# Patient Record
Sex: FEMALE | Race: WHITE | Employment: STUDENT | URBAN - METROPOLITAN AREA
[De-identification: names, ages, dates, MRNs, and addresses within clinical notes are randomized per-mention and may not be internally consistent; named-entity substitution may affect disease eponyms.]

---
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
## 2020-01-02 ENCOUNTER — APPOINTMENT (OUTPATIENT)
Age: 20
Setting detail: DERMATOLOGY
End: 2020-01-02

## 2020-01-02 DIAGNOSIS — L71.0 PERIORAL DERMATITIS: ICD-10-CM

## 2020-01-02 PROCEDURE — OTHER PRESCRIPTION: OTHER

## 2020-01-02 PROCEDURE — OTHER COUNSELING: OTHER

## 2020-01-02 PROCEDURE — 99213 OFFICE O/P EST LOW 20 MIN: CPT

## 2020-01-02 RX ORDER — CLINDAMYCIN PHOSPHATE 10 MG/ML
LOTION TOPICAL
Qty: 1 | Refills: 2 | Status: ERX | COMMUNITY
Start: 2020-01-02

## 2020-01-02 NOTE — HPI: RASH
How Severe Is Your Rash?: severe
Is This A New Presentation, Or A Follow-Up?: Rash
Additional History: She has been using metronidazole qd

## 2020-03-04 ENCOUNTER — DOCUMENTATION ONLY (OUTPATIENT)
Dept: FAMILY MEDICINE | Facility: CLINIC | Age: 20
End: 2020-03-04

## 2020-03-04 NOTE — PROGRESS NOTES
[facility]  Siva ATC follow-up note  Date of service performed: 3/4/20    Concern/injury: Maintenance    Assessment/plan: Jazmín Millan ATC

## 2020-03-18 ENCOUNTER — RX ONLY (RX ONLY)
Age: 20
End: 2020-03-18

## 2020-03-18 RX ORDER — METRONIDAZOLE 7.5 MG/G
CREAM TOPICAL BID
Qty: 1 | Refills: 0 | Status: ERX

## 2020-05-19 ENCOUNTER — APPOINTMENT (OUTPATIENT)
Age: 20
Setting detail: DERMATOLOGY
End: 2020-05-19

## 2020-05-19 DIAGNOSIS — L70.0 ACNE VULGARIS: ICD-10-CM

## 2020-05-19 DIAGNOSIS — L71.0 PERIORAL DERMATITIS: ICD-10-CM

## 2020-05-19 PROCEDURE — OTHER ADDITIONAL NOTES: OTHER

## 2020-05-19 PROCEDURE — OTHER PRESCRIPTION SAMPLES PROVIDED: OTHER

## 2020-05-19 PROCEDURE — OTHER COUNSELING: OTHER

## 2020-05-19 PROCEDURE — OTHER PRESCRIPTION: OTHER

## 2020-05-19 PROCEDURE — 99213 OFFICE O/P EST LOW 20 MIN: CPT

## 2020-05-19 NOTE — PROCEDURE: COUNSELING
Patient Specific Counseling (Will Not Stick From Patient To Patient): Continue Spironolactone 50mg bid that primary care prescribed.
Birth Control Pills Counseling: Birth Control Pill Counseling: I discussed with the patient the potential side effects of OCPs including but not limited to increased risk of stroke, heart attack, thrombophlebitis, deep venous thrombosis, hepatic adenomas, breast changes, GI upset, headaches, and depression.  The patient verbalized understanding of the proper use and possible adverse effects of OCPs. All of the patient's questions and concerns were addressed.
Isotretinoin Counseling: Patient should get monthly blood tests, not donate blood, not drive at night if vision affected, not share medication, and not undergo elective surgery for 6 months after tx completed. Side effects reviewed, pt to contact office should one occur.
Include Pregnancy/Lactation Warning?: No
Isotretinoin Pregnancy And Lactation Text: This medication is Pregnancy Category X and is considered extremely dangerous during pregnancy. It is unknown if it is excreted in breast milk.
Detail Level: Detailed
Erythromycin Pregnancy And Lactation Text: This medication is Pregnancy Category B and is considered safe during pregnancy. It is also excreted in breast milk.
Topical Clindamycin Pregnancy And Lactation Text: This medication is Pregnancy Category B and is considered safe during pregnancy. It is unknown if it is excreted in breast milk.
Topical Clindamycin Counseling: Patient counseled that this medication may cause skin irritation or allergic reactions.  In the event of skin irritation, the patient was advised to reduce the amount of the drug applied or use it less frequently.   The patient verbalized understanding of the proper use and possible adverse effects of clindamycin.  All of the patient's questions and concerns were addressed.
Dapsone Counseling: I discussed with the patient the risks of dapsone including but not limited to hemolytic anemia, agranulocytosis, rashes, methemoglobinemia, kidney failure, peripheral neuropathy, headaches, GI upset, and liver toxicity.  Patients who start dapsone require monitoring including baseline LFTs and weekly CBCs for the first month, then every month thereafter.  The patient verbalized understanding of the proper use and possible adverse effects of dapsone.  All of the patient's questions and concerns were addressed.
Birth Control Pills Pregnancy And Lactation Text: This medication should be avoided if pregnant and for the first 30 days post-partum.
Spironolactone Counseling: Patient advised regarding risks of diarrhea, abdominal pain, hyperkalemia, birth defects (for female patients), liver toxicity and renal toxicity. The patient may need blood work to monitor liver and kidney function and potassium levels while on therapy. The patient verbalized understanding of the proper use and possible adverse effects of spironolactone.  All of the patient's questions and concerns were addressed.
Benzoyl Peroxide Counseling: Patient counseled that medicine may cause skin irritation and bleach clothing.  In the event of skin irritation, the patient was advised to reduce the amount of the drug applied or use it less frequently.   The patient verbalized understanding of the proper use and possible adverse effects of benzoyl peroxide.  All of the patient's questions and concerns were addressed.
Doxycycline Counseling:  Patient counseled regarding possible photosensitivity and increased risk for sunburn.  Patient instructed to avoid sunlight, if possible.  When exposed to sunlight, patients should wear protective clothing, sunglasses, and sunscreen.  The patient was instructed to call the office immediately if the following severe adverse effects occur:  hearing changes, easy bruising/bleeding, severe headache, or vision changes.  The patient verbalized understanding of the proper use and possible adverse effects of doxycycline.  All of the patient's questions and concerns were addressed.
Dapsone Pregnancy And Lactation Text: This medication is Pregnancy Category C and is not considered safe during pregnancy or breast feeding.
Erythromycin Counseling:  I discussed with the patient the risks of erythromycin including but not limited to GI upset, allergic reaction, drug rash, diarrhea, increase in liver enzymes, and yeast infections.
Azithromycin Pregnancy And Lactation Text: This medication is considered safe during pregnancy and is also secreted in breast milk.
Azithromycin Counseling:  I discussed with the patient the risks of azithromycin including but not limited to GI upset, allergic reaction, drug rash, diarrhea, and yeast infections.
Sarecycline Counseling: Patient advised regarding possible photosensitivity and discoloration of the teeth, skin, lips, tongue and gums.  Patient instructed to avoid sunlight, if possible.  When exposed to sunlight, patients should wear protective clothing, sunglasses, and sunscreen.  The patient was instructed to call the office immediately if the following severe adverse effects occur:  hearing changes, easy bruising/bleeding, severe headache, or vision changes.  The patient verbalized understanding of the proper use and possible adverse effects of sarecycline.  All of the patient's questions and concerns were addressed.
Tazorac Pregnancy And Lactation Text: This medication is not safe during pregnancy. It is unknown if this medication is excreted in breast milk.
High Dose Vitamin A Counseling: Side effects reviewed, pt to contact office should one occur.
Topical Sulfur Applications Counseling: Topical Sulfur Counseling: Patient counseled that this medication may cause skin irritation or allergic reactions.  In the event of skin irritation, the patient was advised to reduce the amount of the drug applied or use it less frequently.   The patient verbalized understanding of the proper use and possible adverse effects of topical sulfur application.  All of the patient's questions and concerns were addressed.
Doxycycline Pregnancy And Lactation Text: This medication is Pregnancy Category D and not consider safe during pregnancy. It is also excreted in breast milk but is considered safe for shorter treatment courses.
Minocycline Pregnancy And Lactation Text: This medication is Pregnancy Category D and not consider safe during pregnancy. It is also excreted in breast milk.
High Dose Vitamin A Pregnancy And Lactation Text: High dose vitamin A therapy is contraindicated during pregnancy and breast feeding.
Topical Sulfur Applications Pregnancy And Lactation Text: This medication is Pregnancy Category C and has an unknown safety profile during pregnancy. It is unknown if this topical medication is excreted in breast milk.
Topical Retinoid counseling:  Patient advised to apply a pea-sized amount only at bedtime and wait 30 minutes after washing their face before applying.  If too drying, patient may add a non-comedogenic moisturizer. The patient verbalized understanding of the proper use and possible adverse effects of retinoids.  All of the patient's questions and concerns were addressed.
Minocycline Counseling: Patient advised regarding possible photosensitivity and discoloration of the teeth, skin, lips, tongue and gums.  Patient instructed to avoid sunlight, if possible.  When exposed to sunlight, patients should wear protective clothing, sunglasses, and sunscreen.  The patient was instructed to call the office immediately if the following severe adverse effects occur:  hearing changes, easy bruising/bleeding, severe headache, or vision changes.  The patient verbalized understanding of the proper use and possible adverse effects of minocycline.  All of the patient's questions and concerns were addressed.
Benzoyl Peroxide Pregnancy And Lactation Text: This medication is Pregnancy Category C. It is unknown if benzoyl peroxide is excreted in breast milk.
Topical Retinoid Pregnancy And Lactation Text: This medication is Pregnancy Category C. It is unknown if this medication is excreted in breast milk.
Bactrim Counseling:  I discussed with the patient the risks of sulfa antibiotics including but not limited to GI upset, allergic reaction, drug rash, diarrhea, dizziness, photosensitivity, and yeast infections.  Rarely, more serious reactions can occur including but not limited to aplastic anemia, agranulocytosis, methemoglobinemia, blood dyscrasias, liver or kidney failure, lung infiltrates or desquamative/blistering drug rashes.
Spironolactone Pregnancy And Lactation Text: This medication can cause feminization of the male fetus and should be avoided during pregnancy. The active metabolite is also found in breast milk.
Tazorac Counseling:  Patient advised that medication is irritating and drying.  Patient may need to apply sparingly and wash off after an hour before eventually leaving it on overnight.  The patient verbalized understanding of the proper use and possible adverse effects of tazorac.  All of the patient's questions and concerns were addressed.
Bactrim Pregnancy And Lactation Text: This medication is Pregnancy Category D and is known to cause fetal risk.  It is also excreted in breast milk.
Tetracycline Counseling: Patient counseled regarding possible photosensitivity and increased risk for sunburn.  Patient instructed to avoid sunlight, if possible.  When exposed to sunlight, patients should wear protective clothing, sunglasses, and sunscreen.  The patient was instructed to call the office immediately if the following severe adverse effects occur:  hearing changes, easy bruising/bleeding, severe headache, or vision changes.  The patient verbalized understanding of the proper use and possible adverse effects of tetracycline.  All of the patient's questions and concerns were addressed. Patient understands to avoid pregnancy while on therapy due to potential birth defects.

## 2020-05-19 NOTE — PROCEDURE: ADDITIONAL NOTES
Additional Notes: DISCONTINUE tretinoin .025 and .05 cream to lower half of face as this is likely aggravating her POD and d/c neutrogrna hydro boost creams for now.
Detail Level: Simple

## 2020-06-08 ENCOUNTER — APPOINTMENT (OUTPATIENT)
Age: 20
Setting detail: DERMATOLOGY
End: 2020-06-08

## 2020-06-08 VITALS — RESPIRATION RATE: 14 BRPM | WEIGHT: 145 LBS | HEIGHT: 67 IN

## 2020-07-08 DIAGNOSIS — Z11.59 SPECIAL SCREENING EXAMINATION FOR VIRAL DISEASE: Primary | ICD-10-CM

## 2020-07-10 DIAGNOSIS — Z11.59 SPECIAL SCREENING EXAMINATION FOR VIRAL DISEASE: ICD-10-CM

## 2020-07-10 NOTE — LETTER
July 17, 2020        Alessandra Mccormick  [address] [facility] [address]    This letter provides a written record that you were tested for COVID-19 on 7/10/20.       Your result was negative. This means that we didn t find the virus that causes COVID-19 in your sample. A test may show negative when you do actually have the virus. This can happen when the virus is in the early stages of infection, before you feel illness symptoms.    If you have symptoms   Stay home and away from others (self-isolate) until you meet ALL of the guidelines below:    You ve had no fever--and no medicine that reduces fever--for 3 full days (72 hours). And      Your other symptoms have gotten better. For example, your cough or breathing has improved. And     At least 10 days have passed since your symptoms started.    During this time:    Stay home. Don t go to work, school or anywhere else.     Stay in your own room, including for meals. Use your own bathroom if you can.    Stay away from others in your home. No hugging, kissing or shaking hands. No visitors.    Clean  high touch  surfaces often (doorknobs, counters, handles, etc.). Use a household cleaning spray or wipes. You can find a full list on the EPA website at www.epa.gov/pesticide-registration/list-n-disinfectants-use-against-sars-cov-2.    Cover your mouth and nose with a mask, tissue or washcloth to avoid spreading germs.    Wash your hands and face often with soap and water.    Going back to work  Check with your employer for any guidelines to follow for going back to work.    Employers: This document serves as formal notice that your employee tested negative for COVID-19, as of the testing date shown above.

## 2020-07-10 NOTE — LETTER
July 16, 2020        Alessandra Mccormick  [address] [facility] [address]      COVID-19 Antibody, IgG   Date Value Ref Range Status   07/10/2020 Negative NEG^Negative Final     Comment:     Negative results do not rule out SARS-CoV-2 infection, particularly in those   who have been in contact with the virus.  Follow-up testing with a molecular   diagnostic should be considered to rule out infection in these individuals.  Results from antibody testing should not be used as the sole basis to diagnose   or exclude SARS-CoV-2 infection or to inform infection status.           You have tested NEGATIVE for COVID-19 antibodies. This suggests you have not had or been exposed to COVID-19. But it does not mean that for sure.     The test finds antibodies in most people 10 days after they get sick. For some people, it takes longer than 10 days for antibodies to show up. Others may never show antibodies against COVID-19, especially if they have weak immune systems.    If you have COVID-19 symptoms now, please stay home and away from others.     What is antibody testing?    This is a kind of blood test. We take a small sample of your blood, and then test it for something called  antibodies.      Your body makes antibodies to fight infection. If your blood has antibodies for a certain germ, it means you ve been infected with that germ in the past.     Sometimes, antibodies stay in your body for years after you ve had the infection. They can be there even if the germ didn t make you sick. They are a sign that your body fought off the infection.    Will this test find antibodies in everyone who s had COVID-19?    No. The test finds antibodies in most people 10 days after they get sick. For some people, it takes longer than 10 days for antibodies to show up. Others may never show antibodies against COVID-19, especially if they have weak immune systems.    What does it mean if the test finds COVID-19 antibodies?    If we  find these antibodies, it suggests:     This person has had the virus.     Their body s immune system fought the virus.     We don t know if this will help protect someone from getting COVID-19 again. Scientists are still learning about this.    What are the signs of COVID-19?    Signs of COVID-19 can appear from 2 to 14 days (up to 2 weeks) after you re infected. Some people have no symptoms or only mild symptoms. Others get very sick. The most common symptoms are:          Cough    Shortness of breath or trouble breathing  Or at least 2 of these symptoms:    Fever    Chills    Repeated shaking with chills    Muscle pain    Headache    Sore throat    Losing your sense of taste or smell    You may have other symptoms. Please contact your doctor or clinic for any symptoms that worry you.    Where can I get more information?     To learn the [facility] guidelines for staying home, please visit the [place] Department of Health website at [email or web address]    To learn more about COVID-19 and how to care for yourself at home, please visit the CDC website at https://www.cdc.gov/coronavirus/2019-ncov/about/steps-when-sick.html    For more options for care at [facility], please visit our website at [email or web address]    [facility] ([facility]) COVID-19 Hotline:  [phone]

## 2020-07-11 LAB
SARS-COV-2 RNA SPEC QL NAA+PROBE: NOT DETECTED
SPECIMEN SOURCE: NORMAL

## 2020-07-12 DIAGNOSIS — Z11.59 SPECIAL SCREENING EXAMINATION FOR VIRAL DISEASE: ICD-10-CM

## 2020-07-13 LAB
COVID-19 ANTIBODY IGG: NEGATIVE
LAB TEST METHOD: NORMAL
SARS-COV-2 RNA SPEC QL NAA+PROBE: NOT DETECTED
SPECIMEN SOURCE: NORMAL

## 2020-08-08 DIAGNOSIS — Z11.59 SPECIAL SCREENING EXAMINATION FOR VIRAL DISEASE: Primary | ICD-10-CM

## 2020-08-11 DIAGNOSIS — Z11.59 SPECIAL SCREENING EXAMINATION FOR VIRAL DISEASE: ICD-10-CM

## 2020-08-12 LAB
SARS-COV-2 RNA SPEC QL NAA+PROBE: NOT DETECTED
SPECIMEN SOURCE: NORMAL

## 2020-08-13 DIAGNOSIS — Z11.59 SPECIAL SCREENING EXAMINATION FOR VIRAL DISEASE: Primary | ICD-10-CM

## 2020-08-18 DIAGNOSIS — Z11.59 SPECIAL SCREENING EXAMINATION FOR VIRAL DISEASE: ICD-10-CM

## 2020-08-19 LAB
SARS-COV-2 RNA SPEC QL NAA+PROBE: NOT DETECTED
SPECIMEN SOURCE: NORMAL

## 2020-08-20 ENCOUNTER — DOCUMENTATION ONLY (OUTPATIENT)
Dept: FAMILY MEDICINE | Facility: CLINIC | Age: 20
End: 2020-08-20

## 2020-08-21 ENCOUNTER — ANCILLARY PROCEDURE (OUTPATIENT)
Dept: GENERAL RADIOLOGY | Facility: CLINIC | Age: 20
End: 2020-08-21
Attending: FAMILY MEDICINE
Payer: COMMERCIAL

## 2020-08-21 DIAGNOSIS — M25.571 ACUTE RIGHT ANKLE PAIN: ICD-10-CM

## 2020-08-21 DIAGNOSIS — M25.571 ACUTE RIGHT ANKLE PAIN: Primary | ICD-10-CM

## 2020-08-21 NOTE — PROGRESS NOTES
"[facility] ATHLETICS  [facility] ATC initial assessment note  Date of service performed: 8/20/2020    Concern: Acute injury  Body part: Ankle  Description: Right  Injury: Sprain  Type: Athletics related  Date of injury: 8/20/2020    S: Pt c/o lateral R ankle P! After inversion mechanism with contact.  Pt has hx of bilateral ankle sprains, reports she was doing steady rehab for a \"long time\".  Visible edema over ATF, anterior to lat mal. No visible bruising. Pt denies N/T. Distal pulse intact.    O: Squeeze (-) for farideh p!, mild p! Over ligaments during test. Bump (-), Malleolar flick (-), no fracture suspected.  Anterior Drawer (+) for p! And laxity. End feel present. Kleigers (-).  Limited ROM in all directions, strength not evaluated based on p! And swelling.      A: Lateral ankle sprain, grade II     P: Pt removed from practice, ice with compression for 30 minutes.  She was given horseshoe pad and compression to wear after showering for rest of the evening.  Instructed not to sleep with compression.  Pt reports she is eager to use the Christianity bands for edema drainage, reports they helped \"a ton\" last time she rolled her ankle.  We will re-evaluate tomorrow and consider appropriate treatments.     Chasidy Parker, ATC          "

## 2020-08-21 NOTE — PROGRESS NOTES
"[facility] ATHLETICS  [facility] ATC follow-up note  Date of service performed: 8/21/2020    Concern/injury: R Ankle Sprain    Assessment/plan: Pt f/u with ATC at field today.  Increased edema now present anterior, posterior, and inferior to R lat mal. Pt is now tender to palpate over lateral malleolus and along distal shaft of fibula.  Bruisuing visible posterior to lat mal and along distal shaft of fibula.  She reports it hurts \"quite a bit more\" today than yesterday and reports its now feels \"different\" than pas ankle sprains.  ATC consulted Dr Reeder, and Pt was [place] [facility] [place].      Chasidy Parker ATC      "

## 2020-08-24 NOTE — PROGRESS NOTES
[facility] ATHLETICS  [facility] ATC follow-up note  Date of service performed: 8/24/20    Concern/injury: R Ankle    Assessment/plan:  s ankle pain and swelling has improved by this morning, though she still does have a moderate amount of ankle swelling.  began a home rehab program consisting of ROM and proprioception exercises to be completed daily and she was given a new compression sleeve to wear during the day.  had ankle tape applied and was able to participate in dynamic warm up and 20 minutes of technical work during practice today.    Linda Millan ATC

## 2020-08-25 DIAGNOSIS — Z11.59 SPECIAL SCREENING EXAMINATION FOR VIRAL DISEASE: ICD-10-CM

## 2020-08-26 LAB
SARS-COV-2 RNA SPEC QL NAA+PROBE: NOT DETECTED
SPECIMEN SOURCE: NORMAL

## 2020-08-26 NOTE — PROGRESS NOTES
[facility] ATHLETICS  [facility] ATC follow-up note  Date of service performed: 8/25/20    Concern/injury: R Ankle    Assessment/plan:  noted that her ankle is more sore today than it was yesterday.  performed rehab on her own at home and compression/distraction routine prior to practice.  s ankle was taped and she participated in full dynamic warm up, followed by about 15 minutes of technical work. After,  completed a bike workout and noted that her ankle began to hurt more by the end of the bike workout and was much more sore after. Ice was applied, and  will be reevaluated tomorrow morning.    Linda Millan, ATC

## 2020-08-27 NOTE — PROGRESS NOTES
[facility] ATHLETICS  [facility] ATC follow-up note  Date of service performed: 8/26/20    Concern/injury: R Ankle    Assessment/plan: Still very sore this morning. No increase in swelling noted. Voo doo band compression distraction ROM routine, BAPS board ROM, heel/toe walking, S/L balance. SA will complete an upper body only workout today and will only participate in throw ins during practice today. Will re-evaluate tomorrow morning.    Linda Millan, ATC

## 2020-08-27 NOTE — PROGRESS NOTES
[facility] ATHLETICS  [facility] ATC follow-up note  Date of service performed: 8/27/20    Concern/injury: R Ankle    Assessment/plan: SA feeling slightly better today. Swelling seems to have diminished slightly, but bruising has increased. Voo-doo band compression/distraction ROM routine. BAPS board ROM, heel/toe walking, S/L eyes closed balance. Tape ankle for practice. Participated in full dynamic warm up and 20 minutes of technical work. Finished with bike workout. Ice wrapped on after.    Linda Millan ATC

## 2020-08-28 NOTE — PROGRESS NOTES
[facility] ATHLETICS  [facility] ATC follow-up note  Date of service performed: 8/28/20    Concern/injury: R Ankle    Assessment/plan: Ankle is still feeling as good as it did yesterday. Swelling/bruising remains the same as yesterday as well. Voo doo band compression/distraction ROM work. BAPS board ROM, heel/toe walking, S/L eyes closed balance, slant board stretch. Tape for practice, able to participate in technical passing patterns as well as soccer tennis without restrictions.    Linda Millan, ATC

## 2020-09-01 DIAGNOSIS — Z11.59 SPECIAL SCREENING EXAMINATION FOR VIRAL DISEASE: ICD-10-CM

## 2020-09-01 NOTE — PROGRESS NOTES
[facility] ATHLETICS  [facility] ATC follow-up note  Date of service performed: 8/31/20    Concern/injury: R Ankle    Assessment/plan: Feeling much better, swelling and bruising almost completely diminished. Rehab: BAPS board, 4 way ankle, S/L calf raise, S/L balance. SA able to participate in a non-contact capacity today. Practice felt good.    Linda Millan ATC

## 2020-09-02 LAB
SARS-COV-2 RNA SPEC QL NAA+PROBE: NOT DETECTED
SPECIMEN SOURCE: NORMAL

## 2020-09-02 NOTE — PROGRESS NOTES
[facility] ATHLETICS  [facility] ATC follow-up note  Date of service performed: 9/1/20    Concern/injury: R Ankle    Assessment/plan: Continuing to feel good this morning. Rehab: BAPS board, 4 way ankle, S/L calf raise, S/L balance. SA able to participate in a non-contact capacity again today. SA did mostly well but accidentally stepped wrong at one point and  tweaked  her ankle slightly. SA had ice wrapped on after practice.    Linda Millan, ATC

## 2020-09-03 NOTE — PROGRESS NOTES
[facility] ATHLETICS  [facility] ATC follow-up note  Date of service performed: 9/2/20    Concern/injury: R Ankle    Assessment/plan: Feeling good again this morning, maybe slightly more sore than yesterday, but mostly feeling good.Rehab: BAPS board, 4 way ankle, S/L calf raise, S/L balance. SA able to participate in practice in a full contact capacity during set pieces. SA felt good during this.    Linda Millan ATC

## 2020-09-04 NOTE — PROGRESS NOTES
[facility] ATHLETICS  [facility] ATC follow-up note  Date of service performed: 9/3/20    Concern/injury: R Ankle    Assessment/plan: SA feeling good again this morning. Rehab: BAPS board, 4 way ankle, S/L calf raise, S/L balance. SA able to participate in practice in a full contact capacity. SA had no issues with practice participation.    Linda Millan, ATC

## 2020-09-08 DIAGNOSIS — Z11.59 SPECIAL SCREENING EXAMINATION FOR VIRAL DISEASE: ICD-10-CM

## 2020-09-08 LAB
SARS-COV-2 RNA SPEC QL NAA+PROBE: NOT DETECTED
SPECIMEN SOURCE: NORMAL

## 2020-09-08 NOTE — PROGRESS NOTES
[facility] ATHLETICS  [facility] ATC follow-up note  Date of service performed: 9/4/20    Concern/injury: R Ankle    Assessment/plan: SA feeling good again this morning. Rehab: BAPS board, 4 way ankle, S/L calf raise, S/L balance. SA able to participate fully in practice with no issues.    Linda Millan, ATC

## 2020-09-09 NOTE — PROGRESS NOTES
[facility] ATHLETICS  [facility] ATC follow-up note  Date of service performed: 9/8/20    Concern/injury: R Ankle    Assessment/plan: SA continuing to feel good today. Rehab: 4 way ankle, S/L calf raise, S/L eyes closed balance, S/L line hops (fwd/back & side to side). SA able to fully participate in practice without any issues.    Linda Millan ATC

## 2020-09-09 NOTE — PROGRESS NOTES
[facility] ATHLETICS  [facility] ATC follow-up note  Date of service performed: 9/9/20    Concern/injury: R Ankle    Assessment/plan: Rehab: 4 way ankle, S/L calf raise, S/L eyes closed balance, S/L line hops (fwd/back & side to side).     Linda Millan, ATC

## 2020-09-14 NOTE — PROGRESS NOTES
[facility] ATHLETICS  [facility] ATC follow-up note  Date of service performed: 9/11/20    Concern/injury: R Ankle    Assessment/plan: SA continuing to feel good. Rehab: 4 way ankle, S/L calf raise, S/L eyes closed balance, S/L line hops (fwd/back & side to side).     Linda Millan, ATC

## 2020-09-15 DIAGNOSIS — Z11.59 SPECIAL SCREENING EXAMINATION FOR VIRAL DISEASE: ICD-10-CM

## 2020-09-15 LAB
SARS-COV-2 RNA SPEC QL NAA+PROBE: NOT DETECTED
SPECIMEN SOURCE: NORMAL

## 2020-09-15 NOTE — PROGRESS NOTES
[facility] ATHLETICS  [facility] ATC follow-up note  Date of service performed: 9/14/20    Concern/injury: R Ankle    Assessment/plan: SA feeling good again after the weekend. Rehab: 4 way ankle, S/L calf raise, S/L eyes closed balance, S/L line hops (fwd/back & side to side).     Linda Millan, ATC

## 2020-09-16 NOTE — PROGRESS NOTES
[facility] ATHLETICS  [facility] ATC follow-up note  Date of service performed: 9/16/20    Concern/injury: R Ankle    Assessment/plan: Rehab: 4 way ankle, S/L calf raise, S/L eyes closed balance, S/L line hops (fwd/back & side to side).     Linda Millan, ATC

## 2020-09-16 NOTE — PROGRESS NOTES
[facility] ATHLETICS  [facility] ATC follow-up note  Date of service performed: 9/15/20    Concern/injury: R Ankle    Assessment/plan: Rehab: 4 way ankle, S/L calf raise, S/L eyes closed balance, S/L line hops (fwd/back & side to side). SA did well with practice participation today.    Linda Millan, ATC

## 2020-09-18 NOTE — PROGRESS NOTES
[facility] ATHLETICS  [facility] ATC follow-up note  Date of service performed: 9/17/20    Concern/injury: R Ankle    Assessment/plan: Rehab: 4 way ankle, S/L calf raise, S/L eyes closed balance, S/L line hops (fwd/back & side to side).     Linda Millan, ATC

## 2020-09-21 NOTE — PROGRESS NOTES
[facility] ATHLETICS  [facility] ATC follow-up note  Date of service performed: 9/21/20    Concern/injury: R Ankle    Assessment/plan: Rehab: 4 way ankle, S/L calf raise, S/L eyes closed balance, S/L line hops (fwd/back & side to side).     Linda Millan, ATC

## 2020-09-22 DIAGNOSIS — Z11.59 SPECIAL SCREENING EXAMINATION FOR VIRAL DISEASE: ICD-10-CM

## 2020-09-23 LAB
SARS-COV-2 RNA SPEC QL NAA+PROBE: NOT DETECTED
SPECIMEN SOURCE: NORMAL

## 2020-09-23 NOTE — PROGRESS NOTES
[facility] ATHLETICS  [facility] ATC follow-up note  Date of service performed: 9/22/20    Concern/injury: R Ankle    Assessment/plan: Rehab: 4 way ankle, S/L calf raise, S/L eyes closed balance, S/L line hops (fwd/back & side to side). No issues at practice today.    Linda Millan, ATC

## 2020-09-28 DIAGNOSIS — Z11.59 SPECIAL SCREENING EXAMINATION FOR VIRAL DISEASE: Primary | ICD-10-CM

## 2020-09-29 DIAGNOSIS — Z11.59 SPECIAL SCREENING EXAMINATION FOR VIRAL DISEASE: ICD-10-CM

## 2020-09-30 LAB
SARS-COV-2 RNA SPEC QL NAA+PROBE: NOT DETECTED
SPECIMEN SOURCE: NORMAL

## 2020-09-30 NOTE — PROGRESS NOTES
[facility] ATHLETICS  [facility] ATC follow-up note  Date of service performed: 9/30/20    Concern/injury: R Ankle    Assessment/plan: SA was in contact w/ATC while in quarantine, and notes that her knee is feeling back to normal at this time. SA will discontinue rehab program and injury will be considered resolved.    Linda Millan ATC

## 2020-10-05 DIAGNOSIS — Z11.59 SPECIAL SCREENING EXAMINATION FOR VIRAL DISEASE: ICD-10-CM

## 2020-10-05 LAB
SARS-COV-2 RNA SPEC QL NAA+PROBE: NOT DETECTED
SPECIMEN SOURCE: NORMAL

## 2020-10-05 PROCEDURE — 99000 SPECIMEN HANDLING OFFICE-LAB: CPT | Performed by: PATHOLOGY

## 2020-10-05 PROCEDURE — U0003 INFECTIOUS AGENT DETECTION BY NUCLEIC ACID (DNA OR RNA); SEVERE ACUTE RESPIRATORY SYNDROME CORONAVIRUS 2 (SARS-COV-2) (CORONAVIRUS DISEASE [COVID-19]), AMPLIFIED PROBE TECHNIQUE, MAKING USE OF HIGH THROUGHPUT TECHNOLOGIES AS DESCRIBED BY CMS-2020-01-R: HCPCS | Mod: 90 | Performed by: PATHOLOGY

## 2020-10-13 DIAGNOSIS — Z11.59 SPECIAL SCREENING EXAMINATION FOR VIRAL DISEASE: ICD-10-CM

## 2020-10-13 PROCEDURE — 99000 SPECIMEN HANDLING OFFICE-LAB: CPT | Performed by: PATHOLOGY

## 2020-10-13 PROCEDURE — U0003 INFECTIOUS AGENT DETECTION BY NUCLEIC ACID (DNA OR RNA); SEVERE ACUTE RESPIRATORY SYNDROME CORONAVIRUS 2 (SARS-COV-2) (CORONAVIRUS DISEASE [COVID-19]), AMPLIFIED PROBE TECHNIQUE, MAKING USE OF HIGH THROUGHPUT TECHNOLOGIES AS DESCRIBED BY CMS-2020-01-R: HCPCS | Mod: 90 | Performed by: PATHOLOGY

## 2020-10-14 LAB
SARS-COV-2 RNA SPEC QL NAA+PROBE: NOT DETECTED
SPECIMEN SOURCE: NORMAL

## 2020-10-21 DIAGNOSIS — Z11.59 SPECIAL SCREENING EXAMINATION FOR VIRAL DISEASE: ICD-10-CM

## 2020-10-21 LAB
SARS-COV-2 RNA SPEC QL NAA+PROBE: NOT DETECTED
SPECIMEN SOURCE: NORMAL

## 2020-10-21 PROCEDURE — U0003 INFECTIOUS AGENT DETECTION BY NUCLEIC ACID (DNA OR RNA); SEVERE ACUTE RESPIRATORY SYNDROME CORONAVIRUS 2 (SARS-COV-2) (CORONAVIRUS DISEASE [COVID-19]), AMPLIFIED PROBE TECHNIQUE, MAKING USE OF HIGH THROUGHPUT TECHNOLOGIES AS DESCRIBED BY CMS-2020-01-R: HCPCS | Mod: 90 | Performed by: PATHOLOGY

## 2020-10-21 PROCEDURE — 99000 SPECIMEN HANDLING OFFICE-LAB: CPT | Performed by: PATHOLOGY

## 2020-11-18 DIAGNOSIS — M25.562 ACUTE PAIN OF LEFT KNEE: Primary | ICD-10-CM

## 2020-11-18 NOTE — PROGRESS NOTES
The patient reported left knee pain with joint line tenderness and mechanical symptoms.  After discussion with Dr. Muller it was determined to obtain an left knee MRI to evaluate the meniscus.       Addendum: I had a chance to review the MRI.  It shows intact cartilage of the tibiofemoral compartments as well as meniscus.  Ligaments are intact.  A fair amount of edema is noted in the patella.  Fissuring of the cartilage over the median ridge is present.    This time I would try to maximize nonsurgical management with things like time, relative rest, oral anti-inflammatories. I will call in Diclofenac.  If it does not see lasting improvement we could consider an injection.  Hopefully through a period of rest and rehabilitation as well as anti-inflammatory we can get this to quiet down.

## 2020-11-23 ENCOUNTER — ANCILLARY PROCEDURE (OUTPATIENT)
Dept: MRI IMAGING | Facility: CLINIC | Age: 20
End: 2020-11-23
Attending: ORTHOPAEDIC SURGERY
Payer: COMMERCIAL

## 2020-11-23 DIAGNOSIS — M25.562 ACUTE PAIN OF LEFT KNEE: ICD-10-CM

## 2020-11-23 PROCEDURE — 73721 MRI JNT OF LWR EXTRE W/O DYE: CPT | Mod: LT | Performed by: RADIOLOGY

## 2020-11-30 ENCOUNTER — RECORDS - HEALTHEAST (OUTPATIENT)
Dept: LAB | Facility: CLINIC | Age: 20
End: 2020-11-30

## 2020-12-01 LAB — C TRACH DNA SPEC QL PROBE+SIG AMP: NEGATIVE

## 2020-12-02 RX ORDER — DICLOFENAC SODIUM 75 MG/1
75 TABLET, DELAYED RELEASE ORAL 2 TIMES DAILY
Qty: 60 TABLET | Refills: 0 | Status: SHIPPED | OUTPATIENT
Start: 2020-12-02 | End: 2021-09-13

## 2020-12-02 RX ORDER — DICLOFENAC SODIUM 75 MG/1
75 TABLET, DELAYED RELEASE ORAL 2 TIMES DAILY
Qty: 60 TABLET | Refills: 0 | Status: SHIPPED | OUTPATIENT
Start: 2020-12-02 | End: 2020-12-02

## 2020-12-28 ENCOUNTER — RECORDS - HEALTHEAST (OUTPATIENT)
Dept: LAB | Facility: CLINIC | Age: 20
End: 2020-12-28

## 2020-12-31 LAB
BACTERIA SPEC CULT: NO GROWTH
BACTERIA SPEC CULT: NORMAL
GRAM STAIN RESULT: NORMAL
GRAM STAIN RESULT: NORMAL

## 2021-01-04 ENCOUNTER — OFFICE VISIT (OUTPATIENT)
Dept: FAMILY MEDICINE | Facility: CLINIC | Age: 21
End: 2021-01-04
Payer: COMMERCIAL

## 2021-01-04 VITALS
SYSTOLIC BLOOD PRESSURE: 116 MMHG | HEIGHT: 67 IN | DIASTOLIC BLOOD PRESSURE: 77 MMHG | BODY MASS INDEX: 23.54 KG/M2 | HEART RATE: 94 BPM | WEIGHT: 150 LBS

## 2021-01-04 DIAGNOSIS — M22.2X2 PATELLOFEMORAL PAIN SYNDROME OF LEFT KNEE: Primary | ICD-10-CM

## 2021-01-04 RX ORDER — MELOXICAM 7.5 MG/1
7.5-15 TABLET ORAL DAILY
Qty: 60 TABLET | Refills: 1 | Status: SHIPPED | OUTPATIENT
Start: 2021-01-04 | End: 2021-09-13

## 2021-01-04 ASSESSMENT — MIFFLIN-ST. JEOR: SCORE: 1483.03

## 2021-01-04 NOTE — LETTER
"  1/4/2021      RE: Alessandra Mccormick  [address] [facility] [address]       [facility]            SUBJECTIVE:     Alessandra Mccormick is a 20 year old female presenting to clinic today with a chief complaint of left knee pain with joint line tenderness and mechanical symptoms. MRI was obtained which showed  No MR evidence of internal derangement. Intact menisci, cruciate ligaments, and medial and lateral supporting structures. Full-thickness fissuring of cartilage over the median ridge of the patella with moderate subchondral edema. No MR evidence of patellofemoral maltracking or trochlear dysplasia. Mild soft tissue edema deep to the iliotibial band at the level of the lateral femoral condyle as can be seen in iliotibial band friction syndrome.  - Dr. Muller prescribed Voltaren orally after reviewing her MRI.  She did not see him in person.    - Went to [facility] and had her knee aspirated even though there was only a small amount of fluid.  Results of that procedure were negative according to the patient. Saw a physician at [facility] and is set up for PT.  - Plan to return there later this week to discuss other possible \"resources\" she could use      [facility], Medications and Allergies were reviewed and updated as needed.    ROS:  As noted above otherwise negative.    There is no problem list on file for this patient.      Current Outpatient Medications   Medication Sig Dispense Refill     diclofenac (VOLTAREN) 75 MG EC tablet Take 1 tablet (75 mg) by mouth 2 times daily 60 tablet 0     levonorgest-eth estrad 91-Day (SEASONIQUE) 0.15-0.03 &0.01 MG per tablet Take 1 tablet by mouth daily                OBJECTIVE:     Vitals:   Vitals:    01/04/21 1008   BP: 116/77   Pulse: 94   Weight: 68 kg (150 lb)   Height: 1.702 m (5' 7\")     BMI: Body mass index is 23.49 kg/m .    Gen:  Well nourished and in no acute distress  HEENT: Extraocular movement " intact.  Neck: supple  Skin: No rash, erythema, ecchymosis or obvious abnormality  Psych: Euthymic   Neuro: Alert and oriented.    MSK:   + Patellofemoral crepitus  + Mild effusion, - redness or warmth  +patellar compression and inhibition testing  + ttp over the superolateral patella  + pain to palpation over patella tendon  + Mild lateral and medial anterior joint line pain  - Amy  - Lachmans and posterior drawer, - varus and valgus  5/5 Strength, intact sensation    MRI Left knee:   1. No MR evidence of internal derangement. Intact menisci, cruciate  ligaments, and medial and lateral supporting structures.     2. Full-thickness fissuring of cartilage over the median ridge of the  patella with moderate subchondral edema. No MR evidence of  patellofemoral maltracking or trochlear dysplasia.     3. Mild soft tissue edema deep to the iliotibial band at the level of  the lateral femoral condyle as can be seen in iliotibial band friction  syndrome.         ASSESSMENT & PLAN:      1. Patellofemoral pain syndrome of left knee  - Consider PRP intra-articular injection  - Continue with physical therapy, try to get in with Cindy  - meloxicam (MOBIC) 7.5 MG tablet; Take 1-2 tablets (7.5-15 mg) by mouth daily  Dispense: 60 tablet; Refill: 1  - Will try to get her in with Dr. Lopez or Dr. Nieto this week    Return to clinic in 1 month for follow up. Return sooner if develops new or worsening symptoms.    Options for treatment and/or follow-up care were reviewed with the patient and . Patient was actively involved in the decision making process. Patient verbalized understanding and was in agreement with the plan.    The patient was seen by and discussed with Dr.Suzanne KENYETTA Reeder MD, CAQ, CCD    Mari Moreno DO  Primary Care Sports Medicine Fellow      Attending Note:   I have personally examined this patient and have reviewed the clinical presentation and progress note with the fellow. I agree with the  treatment plan as outlined. The plan was formulated with the fellow on the day of the patient's visit. I have reviewed all imaging with the fellow and agree with the findings in the documentation.     Coco Reeder MD, CAQ, CCD  [facility]  Sports Medicine and Bone Health      Coco Reeder MD

## 2021-01-04 NOTE — LETTER
Date:January 14, 2021      Patient was self referred, no letter generated. Do not send.        [facility] Health Information

## 2021-01-04 NOTE — PROGRESS NOTES
"[facility]            SUBJECTIVE:     Alessandra Mccormick is a 20 year old female presenting to clinic today with a chief complaint of left knee pain with joint line tenderness and mechanical symptoms. MRI was obtained which showed  No MR evidence of internal derangement. Intact menisci, cruciate ligaments, and medial and lateral supporting structures. Full-thickness fissuring of cartilage over the median ridge of the patella with moderate subchondral edema. No MR evidence of patellofemoral maltracking or trochlear dysplasia. Mild soft tissue edema deep to the iliotibial band at the level of the lateral femoral condyle as can be seen in iliotibial band friction syndrome.  - Dr. Muller prescribed Voltaren orally after reviewing her MRI.  She did not see him in person.    - Went to [facility] and had her knee aspirated even though there was only a small amount of fluid.  Results of that procedure were negative according to the patient. Saw a physician at [facility] and is set up for PT.  - Plan to return there later this week to discuss other possible \"resources\" she could use      [facility], Medications and Allergies were reviewed and updated as needed.    ROS:  As noted above otherwise negative.    There is no problem list on file for this patient.      Current Outpatient Medications   Medication Sig Dispense Refill     diclofenac (VOLTAREN) 75 MG EC tablet Take 1 tablet (75 mg) by mouth 2 times daily 60 tablet 0     levonorgest-eth estrad 91-Day (SEASONIQUE) 0.15-0.03 &0.01 MG per tablet Take 1 tablet by mouth daily                OBJECTIVE:     Vitals:   Vitals:    01/04/21 1008   BP: 116/77   Pulse: 94   Weight: 68 kg (150 lb)   Height: 1.702 m (5' 7\")     BMI: Body mass index is 23.49 kg/m .    Gen:  Well nourished and in no acute distress  HEENT: Extraocular movement intact.  Neck: supple  Skin: No rash, erythema, ecchymosis or obvious abnormality  Psych: Euthymic "   Neuro: Alert and oriented.    MSK:   + Patellofemoral crepitus  + Mild effusion, - redness or warmth  +patellar compression and inhibition testing  + ttp over the superolateral patella  + pain to palpation over patella tendon  + Mild lateral and medial anterior joint line pain  - Amy  - Lachmans and posterior drawer, - varus and valgus  5/5 Strength, intact sensation    MRI Left knee:   1. No MR evidence of internal derangement. Intact menisci, cruciate  ligaments, and medial and lateral supporting structures.     2. Full-thickness fissuring of cartilage over the median ridge of the  patella with moderate subchondral edema. No MR evidence of  patellofemoral maltracking or trochlear dysplasia.     3. Mild soft tissue edema deep to the iliotibial band at the level of  the lateral femoral condyle as can be seen in iliotibial band friction  syndrome.         ASSESSMENT & PLAN:      1. Patellofemoral pain syndrome of left knee  - Consider PRP intra-articular injection  - Continue with physical therapy, try to get in with Cindy  - meloxicam (MOBIC) 7.5 MG tablet; Take 1-2 tablets (7.5-15 mg) by mouth daily  Dispense: 60 tablet; Refill: 1  - Will try to get her in with Dr. Lopez or Dr. Nieto this week    Return to clinic in 1 month for follow up. Return sooner if develops new or worsening symptoms.    Options for treatment and/or follow-up care were reviewed with the patient and . Patient was actively involved in the decision making process. Patient verbalized understanding and was in agreement with the plan.    The patient was seen by and discussed with Dr.Suzanne KENYETTA Reeder MD, CAQ, CCD    Mari Moreno DO  Primary Care Sports Medicine Fellow

## 2021-01-05 ENCOUNTER — ANCILLARY PROCEDURE (OUTPATIENT)
Dept: GENERAL RADIOLOGY | Facility: CLINIC | Age: 21
End: 2021-01-05
Attending: ORTHOPAEDIC SURGERY
Payer: COMMERCIAL

## 2021-01-05 ENCOUNTER — OFFICE VISIT (OUTPATIENT)
Dept: ORTHOPEDICS | Facility: CLINIC | Age: 21
End: 2021-01-05
Payer: COMMERCIAL

## 2021-01-05 VITALS — WEIGHT: 150.3 LBS | BODY MASS INDEX: 24.15 KG/M2 | HEIGHT: 66 IN

## 2021-01-05 DIAGNOSIS — M22.2X2 PATELLOFEMORAL PAIN SYNDROME OF LEFT KNEE: ICD-10-CM

## 2021-01-05 DIAGNOSIS — M22.2X2 PATELLOFEMORAL PAIN SYNDROME OF LEFT KNEE: Primary | ICD-10-CM

## 2021-01-05 PROCEDURE — 99203 OFFICE O/P NEW LOW 30 MIN: CPT | Mod: GC | Performed by: ORTHOPAEDIC SURGERY

## 2021-01-05 PROCEDURE — 73560 X-RAY EXAM OF KNEE 1 OR 2: CPT | Mod: XU | Performed by: RADIOLOGY

## 2021-01-05 PROCEDURE — 77073 BONE LENGTH STUDIES: CPT | Performed by: RADIOLOGY

## 2021-01-05 ASSESSMENT — MIFFLIN-ST. JEOR: SCORE: 1470.76

## 2021-01-05 NOTE — PROGRESS NOTES
Department of Orthopedic Surgery  Bernie Lopez MD        PATIENT NAME: Alessandra Mccormick   MRN: [id number]  AGE: 20 year old  BMI: Body mass index is 24.16 kg/m .  REFERRING PHYSICIAN: No ref. provider found      CHIEF COMPLAINT: Consult (Left knee patellar instability.  Ref. Dr. Reeder. 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 presenting to clinic as a referral from primary care sports medicine for left knee pain. Patient developed left knee pain and mechanical symptoms in early November. Denies any trauma or falls. No previous dislocations. No feelings of patella instability. Notices pain the most with deceleration and cutting maneuvers during practice. Pain worse walking up or down stairs. Majority of pain is located over anterior portion of the knee. Denies any notable knee swelling. Case was discussed with Dr. Muller who ordered an MRI (results below). She was started on a rehab program.    She then went home for [place] and hoped that by reducing her activities her pain would improve.  It did not.  She has never noticed swelling of her knee.  Was seen at [facility] urgent care approximately three weeks ago for a second opinion when her knee pain did not alondra with reduction of her sporting workouts.. Intra-articular fluid was drawn and was negative for any concerning findings per patient, waiting review of those records.  She reported that they had 2 attempts, the first 1 was bloody.  She did not see the fluid that was removed but she understood that it was a very small amount.      She was then referred to Dr. Mendez at [facility] who recommended physical therapy and consider possible patellar microfracture technique in the future as a possible surgical intervention.  This is as per the patient.  No notes are present.    She has been doing rehab with her . Has not started physical therapy at this time. Denies  previous injections or surgery. Lives in [place] and will return to campus officially in 1.5 weeks. She had relative rest over her [place] vacation. Has not been running or practicing since November secondary to pain.       Pertinent negatives:  Patient has no history of DVT or PE. Discussed risk factors.      ALLERGIES: Patient has no known allergies.    MEDICATIONS:     Current Outpatient Medications:      levonorgest-eth estrad 91-Day (SEASONIQUE) 0.15-0.03 &0.01 MG per tablet, Take 1 tablet by mouth daily, Disp: , Rfl:      meloxicam (MOBIC) 7.5 MG tablet, Take 1-2 tablets (7.5-15 mg) by mouth daily, Disp: 60 tablet, Rfl: 1     diclofenac (VOLTAREN) 75 MG EC tablet, Take 1 tablet (75 mg) by mouth 2 times daily (Patient not taking: Reported on 1/5/2021), Disp: 60 tablet, Rfl: 0      MEDICAL HISTORY: No past medical history on file.      SURGICAL HISTORY: No past surgical history on file.      FAMILY HISTORY:   Family History   Problem Relation Age of Onset     Skin Cancer Mother      Breast Cancer Mother      Breast Cancer Cousin          SOCIAL HISTORY:   Social History     Tobacco Use     Smoking status: Never Smoker     Smokeless tobacco: Never Used   Substance Use Topics     Alcohol use: No         PHYSICAL EXAMINATION:  On physical examination the patient appears the stated age, is in no acute distress, affect is appropriate, and breathing is non-labored.  BMI: Body mass index is 24.16 kg/m .     Gen:  Well nourished and in no acute distress  HEENT: Extraocular movement intact  Neck: Supple  Pulm:  Breathing Comfortably. No increased respiratory effort.  Psych: Euthymic. Appropriately answers questions      Gait: patient walks with a normal gait gait.     LLE:  No deformity, skin intact.  Prior surgical incision: none  No areas of erythema or warmth.  Overall limb alignment is: Neutral   Effusion or swelling of the knee: Negative.  Tenderness to palpation: + ttp over the superolateral and inferolateral  patella, + Mild lateral and medial anterior joint line pain, + Patellofemoral crepitus, + mild TTP over lateral portion of proximal patellar tendon attachment. +patellar compression and inhibition testing, + Patellofemoral crepitus. No pain over medial/lateral condyles or medial/lateral tibial plateau.  ROM: 0 to 140  Pain with knee ROM: negative  Crepitance with knee ROM: Positive  Extensor lag: Negative  MCL stability: Stable  Lateral Stability: Stable  Lachman: 1A  Posterior stability: stable  Hip range of motion: Internal rotation to 45, external rotation to 70.  Hip flexion contracture: Positive, 10 degrees  Patellar translation: 2 quadrant medial,  1 quadrants lateral, stable  Apprehension: Negative  Medial patella tilt: Neutral  J-sign: Not present  Achilles tendon: Tight to 90 degrees     Motor intact distally TA/GSC/EHL/FHL with 5/5 strength. SILT sp/dp/tibial/saph/sural nerves. DP/PT pulses palpable, 2+, toes warm and well perfused.       RLE:     No deformity, skin intact.  Prior surgical incision: none  Overall limb alignment is: Neutral   Effusion or swelling of the knee: None  Tenderness to palpation: None  ROM: 0 to 140  Pain with knee ROM: None  Crepitance with knee ROM: None  Extensor lag: Negative  MCL stability: Stable  Lateral Stability: Stable  Lachman: 1A  Posterior stability: stable  Hip range of motion: Internal rotation to 70, external rotation to 40.  Hip flexion contracture: Positive, 20 degrees  Patellar translation: 2 quadrant medial,  1 quadrants lateral, stable  Apprehension: Negative  Medial patella tilt: Neutral  J-sign: Not present   Achilles tendon: Tight to 90 degrees    Motor intact distally TA/GSC/EHL/FHL with 5/5 strength. SILT sp/dp/tibial/saph/sural nerves. DP/PT pulses palpable, 2+, toes warm and well perfused.      IMAGING:   X-rays bilateral knees were obtained today and reviewed.     The 20 degree axial views demonstrate located patella with no abnormal findings    XR six  foot standing demonstrates neutral alignment by weightbearing line.  Bilaterally preserved medial and lateral joint spaces of the knees.  Suggestion of limb version as demonstrated by lack of the condylar notch definition and hidden lesser trochanters    Previously obtained MRI demonstrates: No MR evidence of internal derangement. Intact menisci, cruciate  ligaments, and medial and lateral supporting structures. Full-thickness fissuring of cartilage over the median ridge of the  patella with moderate subchondral edema. No MR evidence of patellofemoral maltracking or trochlear dysplasia. Mild soft tissue edema deep to the iliotibial band at the level of the lateral femoral condyle as can be seen in iliotibial band friction syndrome.      Important Patellofemoral measurements:   CD: 1.3    No TD.  TT-T degrees  Patellar tilt (degrees): 5  Sulcus angle (degrees): 146  PTI: .37        ASSESSMENT/PLAN: Alessandra Mccormick is a 20 year old female [facility]  who presents with 3 months of left knee pain.  MRI demonstrated full-thickness fissuring of cartilage over the median ridge of the patella with moderate subchondral edema.     Exam and standing long leg alignment suggest possible version abnormality.  The next best step to evaluate the patient would be a CT scan to evaluate her tibial and femoral version as possible cause of her MRI findings and pain.  Would recommend PRP intra-articular injection with primary care sports medicine team.  She should return to clinic following CT scan.  [place] [facility]    1. Chondral wear pattern left knee with no acute trauma and not anatomic patellofemoral factors except possible limb version.    - XR Six Foot Standing Extremities; XR Knee Left 1/2 Views; Future - results above  - CT for limb version Bilateral wo Contr; Future  - Plan for physical therapy at [place] [facility] given that patient is returning to campus in 1.5 wks  - Recommend  intra-articular PRP injection with primary care sports medicine.  - Return to clinic for follow up following CT evaluation. Return sooner if develops new or worsening symptoms.    Options for treatment and/or follow-up care were reviewed with the patient was actively involved in the decision making process. Patient verbalized understanding and was in agreement with the plan.    The patient was seen by and discussed with Dr.Elizabeth Lopez, MD Mari Moreno, DO  Primary Care Sports Medicine Fellow      RT: 30 min, CT: 20 min.     ATTESTATION:  I have personally examined this patient and have reviewed the clinical presentation and progress note with the fellow.  I agree with the treatment plan as outlined.  The plan was formulated with the fellow on the day of the resident dictation.     Bernie Lopez

## 2021-01-05 NOTE — LETTER
1/5/2021         RE: Alessandra Mccormick  [address] [facility] [address]        Dear Colleague,    Thank you for referring your patient, Alessandra Mccormick, to the [facility] [place]. Please see a copy of my visit note below.        Department of Orthopedic Surgery  Bernie Lopez MD        PATIENT NAME: Alessandra Mccormick   MRN: [id number]  AGE: 20 year old  BMI: Body mass index is 24.16 kg/m .  REFERRING PHYSICIAN: No ref. provider found      CHIEF COMPLAINT: Consult (Left knee patellar instability.  Ref. Dr. Reeder. 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 Patient developed left knee pain and mechanical symptoms in early November. Denies any trauma or falls. No previous dislocations. No feelings of patella instability. Notices pain the most with deceleration and cutting maneuvers during practice. Pain worse walking up or down stairs. Majority of pain is located over anterior portion of the knee. Denies any notable knee swelling. Case was discussed with Dr. Muller who ordered an MRI (results below). She was started on a rehab program.    She then went home for [place] and hoped that by reducing her activities her pain would improve.  It did not.  She has never noticed swelling of her knee.  Was seen at [facility] urgent care approximately three weeks ago for a second opinion when her knee pain did not alondra with reduction of her sporting workouts.. Intra-articular fluid was drawn and was negative for any concerning findings per patient, waiting review of those records.  She reported that they had 2 attempts, the first 1 was bloody.  She did not see the fluid that was removed but she understood that it was a very small amount.      She was then referred to Dr. Mendez at [facility] who recommended physical  therapy and consider possible patellar microfracture technique in the future as a possible surgical intervention.  This is as per the patient.  No notes are present.    She has been doing rehab with her . Has not started physical therapy at this time. Denies previous injections or surgery. Lives in [place] and will return to campus officially in 1.5 weeks. She had relative rest over her Christmas vacation. Has not been running or practicing since November secondary to pain.       Pertinent negatives:  Patient has no history of DVT or PE. Discussed risk factors.      ALLERGIES: Patient has no known allergies.    MEDICATIONS:     Current Outpatient Medications:      levonorgest-eth estrad 91-Day (SEASONIQUE) 0.15-0.03 &0.01 MG per tablet, Take 1 tablet by mouth daily, Disp: , Rfl:      meloxicam (MOBIC) 7.5 MG tablet, Take 1-2 tablets (7.5-15 mg) by mouth daily, Disp: 60 tablet, Rfl: 1     diclofenac (VOLTAREN) 75 MG EC tablet, Take 1 tablet (75 mg) by mouth 2 times daily (Patient not taking: Reported on 1/5/2021), Disp: 60 tablet, Rfl: 0      MEDICAL HISTORY: No past medical history on file.      SURGICAL HISTORY: No past surgical history on file.      FAMILY HISTORY:   Family History   Problem Relation Age of Onset     Skin Cancer Mother      Breast Cancer Mother      Breast Cancer Cousin          SOCIAL HISTORY:   Social History     Tobacco Use     Smoking status: Never Smoker     Smokeless tobacco: Never Used   Substance Use Topics     Alcohol use: No         PHYSICAL EXAMINATION:  On physical examination the patient appears the stated age, is in no acute distress, affect is appropriate, and breathing is non-labored.  BMI: Body mass index is 24.16 kg/m .     Gen:  Well nourished and in no acute distress  HEENT: Extraocular movement intact  Neck: Supple  Pulm:  Breathing Comfortably. No increased respiratory effort.  Psych: Euthymic. Appropriately answers questions      Gait: patient walks with a  normal gait gait.     LLE:  No deformity, skin intact.  Prior surgical incision: none  No areas of erythema or warmth.  Overall limb alignment is: Neutral   Effusion or swelling of the knee: Negative.  Tenderness to palpation: + ttp over the superolateral and inferolateral patella, + Mild lateral and medial anterior joint line pain, + Patellofemoral crepitus, + mild TTP over lateral portion of proximal patellar tendon attachment. +patellar compression and inhibition testing, + Patellofemoral crepitus. No pain over medial/lateral condyles or medial/lateral tibial plateau.  ROM: 0 to 140  Pain with knee ROM: negative  Crepitance with knee ROM: Positive  Extensor lag: Negative  MCL stability: Stable  Lateral Stability: Stable  Lachman: 1A  Posterior stability: stable  Hip range of motion: Internal rotation to 45, external rotation to 70.  Hip flexion contracture: Positive, 10 degrees  Patellar translation: 2 quadrant medial,  1 quadrants lateral, stable  Apprehension: Negative  Medial patella tilt: Neutral  J-sign: Not present  Achilles tendon: Tight to 90 degrees     Motor intact distally TA/GSC/EHL/FHL with 5/5 strength. SILT sp/dp/tibial/saph/sural nerves. DP/PT pulses palpable, 2+, toes warm and well perfused.       RLE:     No deformity, skin intact.  Prior surgical incision: none  Overall limb alignment is: Neutral   Effusion or swelling of the knee: None  Tenderness to palpation: None  ROM: 0 to 140  Pain with knee ROM: None  Crepitance with knee ROM: None  Extensor lag: Negative  MCL stability: Stable  Lateral Stability: Stable  Lachman: 1A  Posterior stability: stable  Hip range of motion: Internal rotation to 70, external rotation to 40.  Hip flexion contracture: Positive, 20 degrees  Patellar translation: 2 quadrant medial,  1 quadrants lateral, stable  Apprehension: Negative  Medial patella tilt: Neutral  J-sign: Not present   Achilles tendon: Tight to 90 degrees    Motor intact distally TA/GSC/EHL/FHL with  5/5 strength. SILT sp/dp/tibial/saph/sural nerves. DP/PT pulses palpable, 2+, toes warm and well perfused.      IMAGING:   X-rays bilateral knees were obtained today and reviewed.     The 20 degree axial views demonstrate located patella with no abnormal findings    XR six foot standing demonstrates neutral alignment by weightbearing line.  Bilaterally preserved medial and lateral joint spaces of the knees.  Suggestion of limb version as demonstrated by lack of the condylar notch definition and hidden lesser trochanters    Previously obtained MRI demonstrates: No MR evidence of internal derangement. Intact menisci, cruciate  ligaments, and medial and lateral supporting structures. Full-thickness fissuring of cartilage over the median ridge of the  patella with moderate subchondral edema. No MR evidence of patellofemoral maltracking or trochlear dysplasia. Mild soft tissue edema deep to the iliotibial band at the level of the lateral femoral condyle as can be seen in iliotibial band friction syndrome.      Important Patellofemoral measurements:   CD: 1.3    No TD.  TT-T degrees  Patellar tilt (degrees): 5  Sulcus angle (degrees): 146  PTI: .37        ASSESSMENT/PLAN: Alessandra Mccormick is a 20 year old female [facility]  who presents with 3 months of left knee pain.  MRI demonstrated full-thickness fissuring of cartilage over the median ridge of the patella with moderate subchondral edema.     Exam and standing long leg alignment suggest possible version abnormality.  The next best step to evaluate the patient would be a CT scan to evaluate her tibial and femoral version as possible cause of her MRI findings and pain.  Would recommend PRP intra-articular injection with primary care sports medicine team.  She should return to clinic following CT scan.  Physical therapy upstairs in the [facility].    1. Chondral wear pattern left knee with no acute trauma and not anatomic patellofemoral factors  except possible limb version.    - XR Six Foot Standing Extremities; XR Knee Left 1/2 Views; Future - results above  - CT for limb version Bilateral wo Contr; Future  - Plan for physical therapy at the [facility] given that patient is returning to campus in 1.5 wks  - Recommend intra-articular PRP injection with primary care sports medicine.  - Return to clinic for follow up following CT evaluation. Return sooner if develops new or worsening symptoms.    Options for treatment and/or follow-up care were reviewed with the patient was actively involved in the decision making process. Patient verbalized understanding and was in agreement with the plan.    The patient was seen by and discussed with MD Mari Quintanilla,   Primary Care Sports Medicine Fellow      RT: 30 min, CT: 20 min.     ATTESTATION:  I have personally examined this patient and have reviewed the clinical presentation and progress note with the fellow.  I agree with the treatment plan as outlined.  The plan was formulated with the fellow on the day of the resident dictation.     Bernie Lopez

## 2021-01-05 NOTE — NURSING NOTE
"Reason For Visit:   Chief Complaint   Patient presents with     Consult     Left knee patellar instability.  Ref. Dr. Reeder.        Primary MD: [employer]  Ref. MD: Dr. Reeder    ?  No  Occupation student.    Date of injury: unknown  Type of injury:  at the     Date of surgery: no  Type of surgery: no.    Smoker: No  Request smoking cessation information: No    Ht 1.68 m (5' 6.14\")   Wt 68.2 kg (150 lb 4.8 oz)   BMI 24.16 kg/m      Pain Assessment  Patient Currently in Pain: Yes  0-10 Pain Scale: 5  Primary Pain Location: Knee(Left)          Carolyn Formato, LPN  "

## 2021-01-06 ENCOUNTER — ANCILLARY PROCEDURE (OUTPATIENT)
Dept: CT IMAGING | Facility: CLINIC | Age: 21
End: 2021-01-06
Attending: ORTHOPAEDIC SURGERY
Payer: COMMERCIAL

## 2021-01-06 DIAGNOSIS — M22.2X2 PATELLOFEMORAL PAIN SYNDROME OF LEFT KNEE: ICD-10-CM

## 2021-01-06 PROCEDURE — 73700 CT LOWER EXTREMITY W/O DYE: CPT | Mod: LT | Performed by: RADIOLOGY

## 2021-01-13 NOTE — PROGRESS NOTES
Attending Note:   I have personally examined this patient and have reviewed the clinical presentation and progress note with the fellow. I agree with the treatment plan as outlined. The plan was formulated with the fellow on the day of the patient's visit. I have reviewed all imaging with the fellow and agree with the findings in the documentation.     Coco Reeder MD, CAQ, CCD  [facility]  Sports Medicine and Bone Health

## 2021-01-21 ENCOUNTER — OFFICE VISIT (OUTPATIENT)
Dept: ORTHOPEDICS | Facility: CLINIC | Age: 21
End: 2021-01-21
Payer: COMMERCIAL

## 2021-01-21 VITALS — BODY MASS INDEX: 24.11 KG/M2 | HEIGHT: 66 IN | WEIGHT: 150 LBS

## 2021-01-21 DIAGNOSIS — M22.2X2 PATELLOFEMORAL PAIN SYNDROME OF LEFT KNEE: ICD-10-CM

## 2021-01-21 DIAGNOSIS — M25.562 ACUTE PAIN OF LEFT KNEE: Primary | ICD-10-CM

## 2021-01-21 PROCEDURE — 99207 PR NO CHARGE LOS: CPT | Performed by: FAMILY MEDICINE

## 2021-01-21 ASSESSMENT — MIFFLIN-ST. JEOR: SCORE: 1469.37

## 2021-01-21 NOTE — LETTER
1/21/2021      RE: Alessandra Mccormick  [address] [facility] [address]       [facility]  Sports Medicine Clinic  Clinics and Surgery Center  PROCEDURE NOTE    Reason for visit: Left knee PRP intra-articular joint injection    History: Alessandra Mccormick is a 20 year old female [facility]  who presents with 3 months of left knee pain.  MRI demonstrated full-thickness fissuring of cartilage over the median ridge of the patella with moderate subchondral edema.      Prior Imaging: MRI left knee: 1. No MR evidence of internal derangement. Intact menisci, cruciate ligaments, and medial and lateral supporting structures. 2. Full-thickness fissuring of cartilage over the median ridge of the patella with moderate subchondral edema. No MR evidence of patellofemoral maltracking or trochlear dysplasia. 3. Mild soft tissue edema deep to the iliotibial band at the level of the lateral femoral condyle as can be seen in iliotibial band friction Syndrome.    Last injection: No previous injections      Left knee intra-articular plasma rich platelet injections  After risks, benefits and complications  were discussed with the patient she elected to proceed.  Informed consent was obtained. 30 cc of blood was drawn from the antecubital area.  Using sterile technique, the blood was combined with a 5 cc of anticoagulant (ADC) and transferred into a large container and then spun in a centrifuge for 15 min to separate out the leukocyte poor plasm rich platelets. Using sterile technique, the area was first prepped with chloraprep.     Indications: Pain   Needle Size: 22 G  Guidance: landmark guided   Approach: Anterolateral   Location: Knee   Medications: PRP as noted above  Outcome: Tolerated well, no immediate complications  Procedure discussed: discussed risks, benefits, and alternatives   Consent Given by: Patient  Timeout: timeout called immediately prior to procedure     I have  instructed the patient that this procedure should increase the pain temporarily and then improve the pain over a few weeks.  It can be repeated if  Benefits are noted but not as much as anticipated.  Avoid nsaids.  Ok to use tylenol and ice for pain control following the injection.  We could repeat this in approx one month if indicated.      Outcome: Patient tolerate the procedure well.     The procedure was performed by myself with the supervision of MD Mari Mendoza DO   [facility]  Sports Medicine Fellow      Attending Note:   I have personally examined this patient and have reviewed the clinical presentation and progress note with the fellow. I agree with the treatment plan as outlined. The plan was formulated with the fellow on the day of the patient's visit. I have reviewed all imaging with the fellow and agree with the findings in the documentation. I have supervised the PRP procedure and injection.     Coco Reeder MD, CAQ, CCD  [facility]  Sports Medicine and Bone Health      Mari Moreno DO

## 2021-01-21 NOTE — NURSING NOTE
[address] [address]  Dept: [phone]  ______________________________________________________________________________    Patient: Alessandra Mccormick   : 2000   MRN: [id number]   2021    INVASIVE PROCEDURE SAFETY CHECKLIST    Date: 2021   Procedure: Left knee joint PRP injection  Patient Name: Alessandra Mccormick  MRN: [id number]  YOB: 2000    Action: Complete sections as appropriate. Any discrepancy results in a HARD COPY until resolved.     PRE PROCEDURE:  Patient ID verified with 2 identifiers (name and  or MRN): Yes  Procedure and site verified with patient/designee (when able): Yes  Accurate consent documentation in medical record: Yes  H&P (or appropriate assessment) documented in medical record: Yes  H&P must be up to 20 days prior to procedure and updates within 24 hours of procedure as applicable: NA  Relevant diagnostic and radiology test results appropriately labeled and displayed as applicable: Yes  Procedure site(s) marked with provider initials: NA    TIMEOUT:  Time-Out performed immediately prior to starting procedure, including verbal and active participation of all team members addressing the following:Yes  * Correct patient identify  * Confirmed that the correct side and site are marked  * An accurate procedure consent form  * Agreement on the procedure to be done  * Correct patient position  * Relevant images and results are properly labeled and appropriately displayed  * The need to administer antibiotics or fluids for irrigation purposes during the procedure as applicable   * Safety precautions based on patient history or medication use    DURING PROCEDURE: Verification of correct person, site, and procedures any time the responsibility for care of the patient is transferred to another member of the care team.       Prior to injection, verified patient identity using patient's name and date of  birth.  Due to injection administration, patient instructed to remain in clinic for 15 minutes  afterwards, and to report any adverse reaction to me immediately.    REF# [id number]  LOT#     [id number]  EXP: 2023-09-30      Adriana Gastelum, [facility]  January 21, 2021

## 2021-01-21 NOTE — LETTER
Date:February 8, 2021      Patient was self referred, no letter generated. Do not send.        [facility] Health Information

## 2021-01-29 ENCOUNTER — DOCUMENTATION ONLY (OUTPATIENT)
Dept: FAMILY MEDICINE | Facility: CLINIC | Age: 21
End: 2021-01-29

## 2021-01-29 NOTE — PROGRESS NOTES
[facility] ATHLETICS  [facility] follow-up note  Date of service performed: 1/22-1/29    Concern/injury: L Patellar Tendonitis    Assessment/plan: Athlete received PRP injection in L Patellar Tendon on 1/21. Athlete had 3 days of complete rest only walking to practice and doing upper body only bike. Athlete reported feeling still with some throbbing on her patellar tendon. Athlete then began rehabilitation with ATC working on core strength, hip strength, and quad strengthening focusing mainly on eccentric exercises. Athlete has alternated underwater treadmill running with ATC and biking with [employer] to keep cardio up. Athlete has tolerated plan well and continues to work hard to return to sport. Soreness will come occasionally have underwater running by will be gone by the next morning. Athlete will continue to increase intensity in rehab and in running with ATC as tolerated including moving to the [place] for more function cardio for a .    Lee Acevedo, ROSALVA

## 2021-02-04 NOTE — PROGRESS NOTES
[facility] ATHLETICS  [facility] follow-up note  Date of service performed: 2/3    Concern/injury: Patellar Tendonitis    Assessment/plan: Athlete reports increasing pain over patellar tendon starting this afternoon. Athlete received two treatment sessions after practice and will modify lift, workout, and rehab tomorrow to calm down patellar tendon before her return to play begins.    Lee Acevedo, ATC

## 2021-02-04 NOTE — PROGRESS NOTES
[facility]  Sports Medicine Clinic  Clinics and Surgery Center  PROCEDURE NOTE    Reason for visit: Left knee PRP intra-articular joint injection    History: Alessandra Mccormick is a 20 year old female [facility]  who presents with 3 months of left knee pain.  MRI demonstrated full-thickness fissuring of cartilage over the median ridge of the patella with moderate subchondral edema.      Prior Imaging: MRI left knee: 1. No MR evidence of internal derangement. Intact menisci, cruciate ligaments, and medial and lateral supporting structures. 2. Full-thickness fissuring of cartilage over the median ridge of the patella with moderate subchondral edema. No MR evidence of patellofemoral maltracking or trochlear dysplasia. 3. Mild soft tissue edema deep to the iliotibial band at the level of the lateral femoral condyle as can be seen in iliotibial band friction Syndrome.    Last injection: No previous injections      Left knee intra-articular plasma rich platelet injections  After risks, benefits and complications  were discussed with the patient she elected to proceed.  Informed consent was obtained. 30 cc of blood was drawn from the antecubital area.  Using sterile technique, the blood was combined with a 5 cc of anticoagulant (ADC) and transferred into a large container and then spun in a centrifuge for 15 min to separate out the leukocyte poor plasm rich platelets. Using sterile technique, the area was first prepped with chloraprep.     Indications: Pain   Needle Size: 22 G  Guidance: landmark guided   Approach: Anterolateral   Location: Knee   Medications: PRP as noted above  Outcome: Tolerated well, no immediate complications  Procedure discussed: discussed risks, benefits, and alternatives   Consent Given by: Patient  Timeout: timeout called immediately prior to procedure     I have instructed the patient that this procedure should increase the pain temporarily and then improve  the pain over a few weeks.  It can be repeated if  Benefits are noted but not as much as anticipated.  Avoid nsaids.  Ok to use tylenol and ice for pain control following the injection.  We could repeat this in approx one month if indicated.      Outcome: Patient tolerate the procedure well.     The procedure was performed by myself with the supervision of MD Mari Mendoza DO   [facility]  Sports Medicine Fellow

## 2021-02-07 NOTE — PROGRESS NOTES
Attending Note:   I have personally examined this patient and have reviewed the clinical presentation and progress note with the fellow. I agree with the treatment plan as outlined. The plan was formulated with the fellow on the day of the patient's visit. I have reviewed all imaging with the fellow and agree with the findings in the documentation. I have supervised the PRP procedure and injection.     Coco Reeder MD, CAQ, CCD  [facility]  Sports Medicine and Bone Health

## 2021-02-11 ENCOUNTER — DOCUMENTATION ONLY (OUTPATIENT)
Dept: FAMILY MEDICINE | Facility: CLINIC | Age: 21
End: 2021-02-11

## 2021-02-11 NOTE — PROGRESS NOTES
[facility] ATHLETICS  [facility] follow-up note  Date of service performed: 2/11/2021    Concern/injury: L Patellar Tendoitis    Assessment/plan: Athlete continues to have patellar tendon pain but is progressing well through the pain. ATC had discussion with athlete that PRP shot more than likely failed and the pain will continue. Athlete understood and is in good spirits about returning to play. Athlete is compliant with all rehab and treatments scheduled and has begun using diclofenac gel 3x per day per Dr. Reeder's verbal request. Athlete will continute to rehab and treat with ATC.    Lee Acevedo ATC

## 2021-02-22 NOTE — PROGRESS NOTES
[facility] ATHLET[facility]  [facility] follow-up note   Date of service performed: 2/22/2021    Concern/injury: L Patellar Tendonitis    Assessment/plan: Athlete's pain has decrease to almost none even while playing up to 20 minutes of live play. Athlete is compliant with all rehab, treatment, and load restrictions. Return to full play will continue as tolerated. Athlete will take fitness test for the 2nd time tomorrow after failing the first one. If she passes we will be able to see how she tolerates playing in a game.    Lee Acevedo, ATC

## 2021-05-29 ENCOUNTER — RECORDS - HEALTHEAST (OUTPATIENT)
Dept: ADMINISTRATIVE | Facility: CLINIC | Age: 21
End: 2021-05-29

## 2021-06-28 DIAGNOSIS — Z13.6 SCREENING FOR HEART DISEASE: ICD-10-CM

## 2021-07-09 LAB — INTERPRETATION ECG - MUSE: NORMAL

## 2021-07-30 ENCOUNTER — DOCUMENTATION ONLY (OUTPATIENT)
Dept: FAMILY MEDICINE | Facility: CLINIC | Age: 21
End: 2021-07-30

## 2021-07-30 NOTE — PROGRESS NOTES
EKG clearance  Alessandra Mccormick's EKG performed for clearance to participate in intercollegiate athletics at the [facility] has been reviewed on 07/30/21.  Findings are within normal limits.  Findings normal for athletic heart.    Additional follow up is not needed at this time.   Follow up tests: none    Alessandra Mccormick is cleared to participate at this time.

## 2021-08-14 ENCOUNTER — DOCUMENTATION ONLY (OUTPATIENT)
Dept: FAMILY MEDICINE | Facility: CLINIC | Age: 21
End: 2021-08-14
Payer: COMMERCIAL

## 2021-08-16 ENCOUNTER — ANCILLARY PROCEDURE (OUTPATIENT)
Dept: MRI IMAGING | Facility: CLINIC | Age: 21
End: 2021-08-16
Attending: ORTHOPAEDIC SURGERY
Payer: COMMERCIAL

## 2021-08-16 DIAGNOSIS — M25.561 ACUTE PAIN OF RIGHT KNEE: ICD-10-CM

## 2021-08-16 PROCEDURE — 73721 MRI JNT OF LWR EXTRE W/O DYE: CPT | Mod: RT | Performed by: RADIOLOGY

## 2021-08-18 ENCOUNTER — OFFICE VISIT (OUTPATIENT)
Dept: ORTHOPEDICS | Facility: CLINIC | Age: 21
End: 2021-08-18
Payer: COMMERCIAL

## 2021-08-18 DIAGNOSIS — S83.511A RUPTURE OF ANTERIOR CRUCIATE LIGAMENT OF RIGHT KNEE, INITIAL ENCOUNTER: Primary | ICD-10-CM

## 2021-08-18 NOTE — PROGRESS NOTES
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  This was a noncontact pivoting injury as she was being pulled from behind by an opposing player.  She felt a pop and had immediate swelling.  She has had no previous knee injury.  She is using a knee immobilizer and working on swelling control.  Alessandra is here in clinic tonight with her mother and .    Alessandra is a senior here at [employer].  She is an elementary education major.    Physical Examination:  21-year-old female alert oriented no apparent distress.  Sensation intact to touch.  Capillary refill brisk.  Dorsiflexion plantarflexion intact.  Right knee reveals 2-3+ effusion.  Her range of motion is 0 to 10 degrees.  She has a 1-2 B. Lachman.  No obvious posterior drawer.  No obvious opening to varus stress at 0 or 10 degrees.  She has no obvious opening to valgus stress at 0 or 10 degrees.    Imaging:  Findings:     MENISCI:  Medial meniscus: Intact.  Lateral meniscus: Near complete radial tear at the junction of the  posterior horn of the lateral meniscus at its root attachment, seen on  series 7 image 24. Increased edema the meniscocapsular junction  consistent with a meniscocapsular injury, seen on series 6 image  27-29.     LIGAMENTS  Cruciate ligaments: Complete midsubstance tear of the anterior  cruciate ligament with associated anterior translation of the femur in  relation to the tibia.  Medial supporting structures: High-grade tear of the proximal and  distal superficial and deep fibers of the tibial collateral ligament.  Lateral supporting structures: Iliotibial band is intact, lateral  collateral ligament, and popliteus tendon reveal mild proximal sprain.  Intact biceps femoris tendon Moderate grade sprain of the popliteal  fibular ligament. Cortical irregularity at the fibular  tip.     EXTENSOR MECHANISM  Intact.     FLUID  Large joint effusion. No substantial Baker's cyst.     OSSEOUS and ARTICULAR STRUCTURES  Bones: Impaction fracture of the lateral femoral condyle sulcus  terminalis with significant bone marrow edema and cortical disruption.  Microtrabecular impaction of the posterior medial and lateral tibial  plateau without cortical disruption.     Patellofemoral compartment: No hyaline cartilage disease.     Medial compartment: No hyaline cartilage disease.     Lateral compartment: No hyaline cartilage disease.     ANCILLARY FINDINGS  None.                                                                      Impression:  1. Complete mid substance tear of the anterior cruciate ligament with  anterior translation of the tibia with respect to the distal femur.  Posterior cruciate ligament is intact.  2. Associated impaction fracture of the lateral femoral condyle sulcus  terminalis with significant bone marrow edema and cortical disruption,  in addition microtrabecular contusion of the posterior medial and  lateral tibial plateau.  3. High-grade radial tear of the lateral meniscus at the junction of  the posterior horn and its root ligament, by definition within 1 cm of  the root attachment consistent with a lateral meniscus posterior root  tear, best seen on series 7 image 24.  4. Moderate to high-grade sprain at the meniscocapsular junction of  the posterior horn of the medial meniscus.  5. Medially, high-grade tear of the proximal and distal tibial  collateral ligament.   6. Laterally, low-grade sprain of the proximal fibular collateral  ligament, the popliteus tendon, moderate grade sprain of the popliteal  fibular ligament suspected.   7. Large joint effusion.     I have personally reviewed the examination and initial interpretation  and I agree with the findings.     JUTTA ELLERMANN, MD     Impression:  21-year-old gopher  with a significant right knee injury.   Alessandra has a anterior cruciate ligament injury as well as a medial collateral ligament injury and a significant injury to her lateral meniscus.  I had a long conversation with the patient and her mother about this injury.  We discussed treatment options.  Alessandra would like to proceed with ACL reconstruction possible lateral meniscus repair.  We discussed graft options and have decided that a BTB autograft best fits her needs.  I explained the timing of surgery.  I think we need to wait for her knee to quiet down in her motion to improve.  I would like to reexamine her knee in the next couple of weeks to assess healing of her MCL and make sure her motion has improved.    Plan:  1. She will continue to work with her  on swelling control, range of motion, and quadricep firing.  2. She will utilize either an knee immobilizer or a playmaker type hinged brace to protect her MCL.  3. I will see her back on September 8 for a reexamination.  We will tentatively schedule her for surgery on Friday, September 17 at [facility].  We may need to postpone the surgery date if her knee is not ready.

## 2021-08-18 NOTE — LETTER
8/18/2021      RE: Alessandra Mccormick  [address] [facility] [address]       Chief Complaint: Right knee injury    Sport:  Soccer    History of Present Illness:  Alessandra Mccormick is a 21-year-old gopher  who injured her right knee playing soccer on August 14, 2021.  This was a noncontact pivoting injury as she was being pulled from behind by an opposing player.  She felt a pop and had immediate swelling.  She has had no previous knee injury.  She is using a knee immobilizer and working on swelling control.  Alessandra is here in clinic tonight with her mother and .    Alessandra is a senior here at [employer].  She is an elementary education major.    Physical Examination:  21-year-old female alert oriented no apparent distress.  Sensation intact to touch.  Capillary refill brisk.  Dorsiflexion plantarflexion intact.  Right knee reveals 2-3+ effusion.  Her range of motion is 0 to 10 degrees.  She has a 1-2 B. Lachman.  No obvious posterior drawer.  No obvious opening to varus stress at 0 or 10 degrees.  She has no obvious opening to valgus stress at 0 or 10 degrees.    Imaging:  Findings:     MENISCI:  Medial meniscus: Intact.  Lateral meniscus: Near complete radial tear at the junction of the  posterior horn of the lateral meniscus at its root attachment, seen on  series 7 image 24. Increased edema the meniscocapsular junction  consistent with a meniscocapsular injury, seen on series 6 image  27-29.     LIGAMENTS  Cruciate ligaments: Complete midsubstance tear of the anterior  cruciate ligament with associated anterior translation of the femur in  relation to the tibia.  Medial supporting structures: High-grade tear of the proximal and  distal superficial and deep fibers of the tibial collateral ligament.  Lateral supporting structures: Iliotibial band is intact, lateral  collateral ligament, and popliteus tendon reveal mild proximal sprain.  Intact biceps femoris tendon  Moderate grade sprain of the popliteal  fibular ligament. Cortical irregularity at the fibular tip.     EXTENSOR MECHANISM  Intact.     FLUID  Large joint effusion. No substantial Baker's cyst.     OSSEOUS and ARTICULAR STRUCTURES  Bones: Impaction fracture of the lateral femoral condyle sulcus  terminalis with significant bone marrow edema and cortical disruption.  Microtrabecular impaction of the posterior medial and lateral tibial  plateau without cortical disruption.     Patellofemoral compartment: No hyaline cartilage disease.     Medial compartment: No hyaline cartilage disease.     Lateral compartment: No hyaline cartilage disease.     ANCILLARY FINDINGS  None.                                                                      Impression:  1. Complete mid substance tear of the anterior cruciate ligament with  anterior translation of the tibia with respect to the distal femur.  Posterior cruciate ligament is intact.  2. Associated impaction fracture of the lateral femoral condyle sulcus  terminalis with significant bone marrow edema and cortical disruption,  in addition microtrabecular contusion of the posterior medial and  lateral tibial plateau.  3. High-grade radial tear of the lateral meniscus at the junction of  the posterior horn and its root ligament, by definition within 1 cm of  the root attachment consistent with a lateral meniscus posterior root  tear, best seen on series 7 image 24.  4. Moderate to high-grade sprain at the meniscocapsular junction of  the posterior horn of the medial meniscus.  5. Medially, high-grade tear of the proximal and distal tibial  collateral ligament.   6. Laterally, low-grade sprain of the proximal fibular collateral  ligament, the popliteus tendon, moderate grade sprain of the popliteal  fibular ligament suspected.   7. Large joint effusion.     I have personally reviewed the examination and initial interpretation  and I agree with the findings.     JUTTA ELLERMANN,  MD     Impression:  21-year-old gopher  with a significant right knee injury.  Alessandra has a anterior cruciate ligament injury as well as a medial collateral ligament injury and a significant injury to her lateral meniscus.  I had a long conversation with the patient and her mother about this injury.  We discussed treatment options.  Alessandra would like to proceed with ACL reconstruction possible lateral meniscus repair.  We discussed graft options and have decided that a BTB autograft best fits her needs.  I explained the timing of surgery.  I think we need to wait for her knee to quiet down in her motion to improve.  I would like to reexamine her knee in the next couple of weeks to assess healing of her MCL and make sure her motion has improved.    Plan:  1. She will continue to work with her  on swelling control, range of motion, and quadricep firing.  2. She will utilize either an knee immobilizer or a playmaker type hinged brace to protect her MCL.  3. I will see her back on September 8 for a reexamination.  We will tentatively schedule her for surgery on Friday, September 17 at [facility] [place].  We may need to postpone the surgery date if her knee is not ready.          Jean Paul Nieto MD

## 2021-08-18 NOTE — PROGRESS NOTES
MRI reveals ACL tear, lateral meniscus root tear, damage to MCL, posterior capsule, and associated bone contusions to femur and tibial plateau.     Ath will see Dr. Nieto 8/18/21 for planning surgery.

## 2021-08-18 NOTE — PROGRESS NOTES
"[facility] ATHLETICS  Siva ATC initial assessment note  Date of service performed: 08/14/21    Concern: Acute injury  Body part: Knee  Description: Right  Injury: Tear  Type: Athletics related  Date of injury: 08/14/21    S: Ath went to cut and felt her knee \"pop\". She went down on the field until  Jose and I arrived.     O: Ath went down on the field 8 minutes into second half of game vs. [place]. She is a senior defensive player. Ath was trying to break from her jersey being held, went to plant and cut on R leg, and suffered an injury. Upon being called onto the field by officials, ath stated she felt her knee \"pop\". Ath was very hesitant to move. On-field assessment revealed guarding when trying to test for ACL integrity. No significant deformity. Denies N&T.  Jose and I assisted athlete off the field, where I attempted a secondary evaluation. Was not able to assess Lachman's due to guarding. I had Dr. Martinez evaluate her in the ATR, with [place] present. Dr. Martinez believes ath tore ACL.     A: Likely ACL tear    P: Ath will not RTP; Ice 20'; MRI as soon as available to evaluate total injuries. Ath placed in compression sleeve and straight leg immobilizer. WBAT.     Sylvia Bates ATC          "

## 2021-09-08 ENCOUNTER — OFFICE VISIT (OUTPATIENT)
Dept: ORTHOPEDICS | Facility: CLINIC | Age: 21
End: 2021-09-08
Payer: COMMERCIAL

## 2021-09-08 DIAGNOSIS — S83.511D RUPTURE OF ANTERIOR CRUCIATE LIGAMENT OF RIGHT KNEE, SUBSEQUENT ENCOUNTER: Primary | ICD-10-CM

## 2021-09-08 NOTE — LETTER
9/8/2021      RE: Alessandra Mccormick  [address] [facility] [address]       Chief Complaint: Follow-up right knee injury, date of injury August 14, 2021.    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  She tore her ACL as well as injured her MCL.  She has a significant lateral meniscus tear and may have meniscal capsular injury on the medial side.  She has been doing rehabilitation with her .  She feels much better and her swelling is less.  Her range of motion is improving.    Physical Examination:  21-year-old female alert oriented no apparent distress.  Evaluation of the right knee reveals trace effusion.  Range of motion is symmetrical.  2B Lachman.  Unable to get a pivot shift.  No increased opening to varus stress at 0 or 30 degrees.  No increased opening to valgus stress at 0 degrees.  No increased opening to valgus stress at 30 degrees.  No increased external rotation.    Impression:  21-year-old gopher  with a right ACL injury, lateral meniscus tear, and possible medial meniscus tear.  Per MCL is healing and feels stable.  Her lateral side feels stable.  Alessandra 9 a very long conversation about her knee today.  I do believe her knee will be ready for surgery next Friday.  She is scheduled for surgery at [facility] [place] as the best fits into her schedule.  We did discuss his surgery today at some length.  We discussed an ACL reconstruction and possible lateral meniscus repair.  We discussed a possible medial meniscus repair.  I explained to Alessandra the concept of a lateral extra-articular tenodesis.  Explained how we would we use iliotibial band for this graft.  We have decided that if she has a significant pivot shift on her examination under anesthesia that we would add a lateral extra-articular tenodesis.  I did explain the risks of surgery including  bleeding infection nerve damage complications from anesthesia blood clot etc.  I also explained the more pertinent risks with this type of surgery including reinjury.  The possibility of knee stiffness or scar tissue that could be problematic.  There can be complications related to the graft harvest.  There could be hardware complications.  She may have persistent discomfort.  There is a possibility that she develops osteoarthritis earlier in life.  There is a possibility that the meniscus may not heal with the attempt to repair.  She may be unable to return to her desired level of athletic activity.  Alessandra had a chance to have her questions answered.  She understands the surgery as well as the surgical risks and would like to proceed.    Plan:  1. We will proceed with right ACL reconstruction with BTB autograft, possible lateral meniscus repair, possible medial meniscus repair, and possible lateral extra-articular tenodesis based on the pivot shift.  2. She will need preoperative history and physical prior to surgery.        Jean Paul Nieto MD

## 2021-09-08 NOTE — LETTER
Date:September 28, 2021      Patient was self referred, no letter generated. Do not send.        [facility] Health Information

## 2021-09-12 NOTE — PROGRESS NOTES
Chief Complaint: Follow-up right knee injury, date of injury August 14, 2021.    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  She tore her ACL as well as injured her MCL.  She has a significant lateral meniscus tear and may have meniscal capsular injury on the medial side.  She has been doing rehabilitation with her .  She feels much better and her swelling is less.  Her range of motion is improving.    Physical Examination:  21-year-old female alert oriented no apparent distress.  Evaluation of the right knee reveals trace effusion.  Range of motion is symmetrical.  2B Lachman.  Unable to get a pivot shift.  No increased opening to varus stress at 0 or 30 degrees.  No increased opening to valgus stress at 0 degrees.  No increased opening to valgus stress at 30 degrees.  No increased external rotation.    Impression:  21-year-old gopher  with a right ACL injury, lateral meniscus tear, and possible medial meniscus tear.  Per MCL is healing and feels stable.  Her lateral side feels stable.  Alessandra 9 a very long conversation about her knee today.  I do believe her knee will be ready for surgery next Friday.  She is scheduled for surgery [facility] [place] as the best fits into her schedule.  We did discuss his surgery today at some length.  We discussed an ACL reconstruction and possible lateral meniscus repair.  We discussed a possible medial meniscus repair.  I explained to Alessandra the concept of a lateral extra-articular tenodesis.  Explained how we would we use iliotibial band for this graft.  We have decided that if she has a significant pivot shift on her examination under anesthesia that we would add a lateral extra-articular tenodesis.  I did explain the risks of surgery including bleeding infection nerve damage complications from anesthesia blood clot etc.  I also  explained the more pertinent risks with this type of surgery including reinjury.  The possibility of knee stiffness or scar tissue that could be problematic.  There can be complications related to the graft harvest.  There could be hardware complications.  She may have persistent discomfort.  There is a possibility that she develops osteoarthritis earlier in life.  There is a possibility that the meniscus may not heal with the attempt to repair.  She may be unable to return to her desired level of athletic activity.  Alessandra had a chance to have her questions answered.  She understands the surgery as well as the surgical risks and would like to proceed.    Plan:  1. We will proceed with right ACL reconstruction with BTB autograft, possible lateral meniscus repair, possible medial meniscus repair, and possible lateral extra-articular tenodesis based on the pivot shift.  2. She will need preoperative history and physical prior to surgery.

## 2021-09-13 ENCOUNTER — OFFICE VISIT (OUTPATIENT)
Dept: FAMILY MEDICINE | Facility: CLINIC | Age: 21
End: 2021-09-13
Payer: COMMERCIAL

## 2021-09-13 VITALS
WEIGHT: 152 LBS | BODY MASS INDEX: 23.86 KG/M2 | SYSTOLIC BLOOD PRESSURE: 110 MMHG | DIASTOLIC BLOOD PRESSURE: 67 MMHG | HEIGHT: 67 IN | HEART RATE: 69 BPM

## 2021-09-13 DIAGNOSIS — M22.2X2 PATELLOFEMORAL PAIN SYNDROME OF LEFT KNEE: ICD-10-CM

## 2021-09-13 ASSESSMENT — MIFFLIN-ST. JEOR: SCORE: 1487.1

## 2021-09-13 NOTE — LETTER
9/13/2021      RE: Alessandra Mccormick  [address] [facility] [address]       NAME: Alessandra Mccormick      AGE: 21 year old      SEX: female  Chief Complaint/Reason for Procedure:  RIGHT Knee ACL tear, lateral meniscal tear and possible medial meniscal tear  Indications: Instability  Surgeon:  Dr. Chintan Nieto Date of Surgery: 10/17/2021  Location: [facility]  HPI: 20 yo  female  who injured her knee during a  soccer game on Aug 14, 2021.  Noncontact pivoting mechanism of injury.     PAST HISTORY  Surg:  None  Medical conditions:  None      MEDICATIONS:  Current Outpatient Medications   Medication     levonorgest-eth estrad 91-Day (SEASONIQUE) 0.15-0.03 &0.01 MG per tablet     No current facility-administered medications for this visit.       ALLERGIES  Patient has no known allergies.    SOCIAL HISTORY  Social History     Tobacco Use     Smoking status: Never Smoker     Smokeless tobacco: Never Used   Substance Use Topics     Alcohol use: No     Drug use: No     Senior at  with elementary education major    FAMILY HISTORY  Family History   Problem Relation Age of Onset     Skin Cancer Mother      Breast Cancer Mother      Breast Cancer Cousin        Family History of Anesthesia Reaction: No except nausea post op in Mom  Family History of Bleeding Disorder:  No    REVIEW OF SYSTEMS  CONSTITUTIONAL: NEGATIVE for fever, chills, change in weight  INTEGUMENTARY/SKIN: NEGATIVE for worrisome rashes, moles or lesions  EYES: NEGATIVE for vision changes or irritation  ENT/MOUTH: NEGATIVE for ear, mouth and throat problems  RESP: NEGATIVE for significant cough or SOB  BREAST: NEGATIVE for masses, tenderness or discharge  CV: NEGATIVE for chest pain, palpitations or peripheral edema  GI: NEGATIVE for nausea, abdominal pain, heartburn, or change in bowel habits  : NEGATIVE for frequency, dysuria, or hematuria  MUSCULOSKELETAL: NEGATIVE for significant arthralgias or myalgia except in R  "knee  NEURO: NEGATIVE for weakness, dizziness or paresthesias  ENDOCRINE: NEGATIVE for temperature intolerance, skin/hair changes  HEME: NEGATIVE for bleeding problems  PSYCHIATRIC: NEGATIVE for changes in mood or affect    PHYSICAL EXAM  /67   Pulse 69   Ht 1.702 m (5' 7\")   Wt 68.9 kg (152 lb)   LMP  (Within Days)   BMI 23.81 kg/m    General Appearance: Normal  Skin:  Normal  Head:  Normal  Eyes: Normal  Ears: Normal  Nose: Normal  Mouth/Teeth: Normal  Throat: Normal  Neck: Normal  Chest/Lungs: Normal  Heart/Vascular: Normal  Abdomen: Normal  Skeletal: Normal except R knee:  Exam per Dr. Nieto; wearing a brace today  Lymphoid: Normal  Blood Vessels: Normal  Neuromuscular: Normal  Other: Normal    LAB/RADIOLOGY RESULTS  None indicated    Impression:  Health Female  ACL tear, MCL sprain, lateral meniscal tear and possible medial meniscal tear  Other comments:    PLAN  This patient has been examined by me this day and has been found to be an acceptable candidate for surgery with apppropriate anesthesia.    Rula Bates ATC was present for the entire appt.       Coco Reeder MD, CAQ, FACSM, CCD  [facility]  Sports Medicine and Bone Health      "

## 2021-09-13 NOTE — PROGRESS NOTES
NAME: Alessandra Mccormick      AGE: 21 year old      SEX: female  Chief Complaint/Reason for Procedure:  RIGHT Knee ACL tear, lateral meniscal tear and possible medial meniscal tear  Indications: Instability  Surgeon:  Dr. Chintan Nieto Date of Surgery: 10/17/2021  Location: [facility]  HPI: 20 yo  female  who injured her knee during a  soccer game on Aug 14, 2021.  Noncontact pivoting mechanism of injury.     PAST HISTORY  Surg:  None  Medical conditions:  None      MEDICATIONS:  Current Outpatient Medications   Medication     levonorgest-eth estrad 91-Day (SEASONIQUE) 0.15-0.03 &0.01 MG per tablet     No current facility-administered medications for this visit.       ALLERGIES  Patient has no known allergies.    SOCIAL HISTORY  Social History     Tobacco Use     Smoking status: Never Smoker     Smokeless tobacco: Never Used   Substance Use Topics     Alcohol use: No     Drug use: No     Senior at  with elementary education major    FAMILY HISTORY  Family History   Problem Relation Age of Onset     Skin Cancer Mother      Breast Cancer Mother      Breast Cancer Cousin        Family History of Anesthesia Reaction: No except nausea post op in Mom  Family History of Bleeding Disorder:  No    REVIEW OF SYSTEMS  CONSTITUTIONAL: NEGATIVE for fever, chills, change in weight  INTEGUMENTARY/SKIN: NEGATIVE for worrisome rashes, moles or lesions  EYES: NEGATIVE for vision changes or irritation  ENT/MOUTH: NEGATIVE for ear, mouth and throat problems  RESP: NEGATIVE for significant cough or SOB  BREAST: NEGATIVE for masses, tenderness or discharge  CV: NEGATIVE for chest pain, palpitations or peripheral edema  GI: NEGATIVE for nausea, abdominal pain, heartburn, or change in bowel habits  : NEGATIVE for frequency, dysuria, or hematuria  MUSCULOSKELETAL: NEGATIVE for significant arthralgias or myalgia except in R knee  NEURO: NEGATIVE for weakness, dizziness or paresthesias  ENDOCRINE: NEGATIVE for  "temperature intolerance, skin/hair changes  HEME: NEGATIVE for bleeding problems  PSYCHIATRIC: NEGATIVE for changes in mood or affect    PHYSICAL EXAM  /67   Pulse 69   Ht 1.702 m (5' 7\")   Wt 68.9 kg (152 lb)   LMP  (Within Days)   BMI 23.81 kg/m    General Appearance: Normal  Skin:  Normal  Head:  Normal  Eyes: Normal  Ears: Normal  Nose: Normal  Mouth/Teeth: Normal  Throat: Normal  Neck: Normal  Chest/Lungs: Normal  Heart/Vascular: Normal  Abdomen: Normal  Skeletal: Normal except R knee:  Exam per Dr. Nieto; wearing a brace today  Lymphoid: Normal  Blood Vessels: Normal  Neuromuscular: Normal  Other: Normal    LAB/RADIOLOGY RESULTS  None indicated    Impression:  Health Female  ACL tear, MCL sprain, lateral meniscal tear and possible medial meniscal tear  Other comments:    PLAN  This patient has been examined by me this day and has been found to be an acceptable candidate for surgery with apppropriate anesthesia.    Rula Bates ATC was present for the entire appt.       Coco Reeder MD, CAQ, FACSM, CCD  [facility]  Sports Medicine and Bone Health  Team Physician;  Athletics    "

## 2021-09-15 NOTE — PROGRESS NOTES
JEANNIE has progressed extremely well since initial injury. Currently has no pain, minimal effusion, is relatively stable considering the extent of her injuries, and has had a positive affect the past couple weeks. Rehab has consisted of quad control, concentric exercises, glute, calf and hamstring strengthening and single leg balance. JEANNIE is prepared for surgery on 9/17 with Dr. Nieto at [facility]. Her mother will be with her, and they plan to go home following discharge.

## 2021-09-24 ENCOUNTER — DOCUMENTATION ONLY (OUTPATIENT)
Dept: FAMILY MEDICINE | Facility: CLINIC | Age: 21
End: 2021-09-24

## 2021-09-27 NOTE — PROGRESS NOTES
[facility] ATHLETIC MEDICINE  [facility]   Sport Psychology Intake Note      Date of Visit: 9/24/21  Duration of Session: 45-50 minutes  Referred by:     Alessandra Mccormick presented on time for initial telehealth/videoconference. Alessandra Mccormick was located at the following address for the appointment: [address] [place], [place] [id number] (Parents House)     Emergency contacts provided:  General: Eloy Mccormick, Mother, [phone]  In-person: Eloy Mccormick, Mother, [phone]      The risks and benefits of telehealth and what to do if there is a break in the connection were reviewed. Physical environment/space was confirmed to be secure and private on both ends. The session was both audio and visual on Simple [employer], a secure system that is HIPAA compliant/certified. The telehealth consent form was signed prior to the session; see signed form in chart. The nature and limits of confidentiality, were discussed and Alessandra Mccormick stated understanding and consent.      Alessandra Mccormick is a 21 year old American female.  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 She is not an international student.     Self-reported Concerns/Symptoms:  Injury-related concerns      Suicide and Risk Assessment:  Recent suicidal thoughts: No  Past suicidal thoughts: No  Recent homicidal thoughts: No  Any attempts in the past: No  Any family/friends/loved ones die by suicide: No  Plan or considering various methods: No  Access to guns: No  Protective factors: no h/o suicide attempt, no plan or intent, no h/o risky impulsive behavior and h/o seeking help when needed      Alessandra denies current urges to self-harm, homicidal ideation, suicidal ideation, means, plans, or intent.    Mental Status & Observations:  Alessandra appeared generally alert and oriented. Dress was appropriate to the weather and occasion. Grooming and hygiene were appropriate. Eye contact was good. Speech  was of normal volume and normal. Thought processes were relevant, logical and goal-directed. Thought content was within normal limits with no evidence of psychotic or paranoid features. Memory appeared intact. She exhibited normal motor activity during the appointment. Mood was smiling with congruent affect. Insight and judgment appeared age appropriate with good focus in session.  Behavior was cooperative, engaging, open and relaxed    Family Background:  Tru reported being from [place], [place] and her immediate family consisting of her mom, dad, and two older sisters (Jaron: 23 yo nurse and Alina 27 yo). Naseemt also shared having a family puppy karla Ma. Naseemt stated her family is very close, loving, and supportive.     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 Naseemt identified as being a good student and getting good grades. Tru noted currently being in practicum and excited to rejoin classes in person when she is able to get around post-sx independently. Naseemt shared due to her injury she is planning on coming back for a 5th year and has her education planned out.     Social:  Tur noted overall being a very social person and having great relationships with her teammates and coaches. Naseemt shared she also has friends outside of her team from home she keeps in contacts with when she's not spending time with her family.    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 Tru shared loving her team culture/bond with teammates/coaches and feeling valued by everyone.    History of medical and mental health concerns:  Concussion: No  Current/past sports injury: Yes: ACL/Meniscus tear ~5 weeks ago (sx was on 9/17/21).  Nutrition/eating/appetite: No  Body image: No  Sleep: No    Substance use (alcohol, caffeine, tobacco, cannabis, other): No  Family history of substance abuse: No  Medications/vitamins/supplements: N/A  Concentration/focus/ADHD: No  Caffeine use: Yes: Coffee  PTSD/trauma/abuse: No  Significant loss: No  Known history of mental health in self: No  History of therapy or prescribed medications: No  Known history of mental health in family member(s): No  Legal issues: No  Financial concerns: No   Receives partial scholarship  Bonita/Hobbies/Personality:    Identifies as Adventist   Reported hobbies consist of Hanging with friends/family.    Coping skills, strengths and supports:   Communication skills, Family support, Maturity and judgment, Social support system and Use of available services    Goals for counseling:  Mentally/emotionally working through injury    Clinical impressions or Other:  Clt presents with good insight into her presenting concerns and showcases numerous strengths that will help her within this process. Clt also demonstrates being open and vulnerable with the process.    Therapy objectives/goals:  Support injury/recovery  Teach and improve coping skills    Therapy follow-up plan:  Individual counseling sessions monthly (Bi-monthly)    Clt's next appointment was scheduled for Friday, 10/8 @ 9 AM.    HW: Clt was encouraged to start using her injury journal as a way to reflect on her days mentally/emotionall/physically to document her progress/injury rehab.      Any Saldivar MA, LPC

## 2021-10-06 ENCOUNTER — OFFICE VISIT (OUTPATIENT)
Dept: ORTHOPEDICS | Facility: CLINIC | Age: 21
End: 2021-10-06
Payer: COMMERCIAL

## 2021-10-06 DIAGNOSIS — Z98.890 S/P ACL RECONSTRUCTION: Primary | ICD-10-CM

## 2021-10-06 NOTE — LETTER
10/6/2021      RE: Alessandra Mccormick  [address] [facility] [address]       Chief Complaint:  Postoperative visit right knee    Date of Surgery:  09/17/21    History of Present Illness:  Alessandra is a 21-year-old  who is now 2-1/2 weeks status post right ACL reconstruction with BTB autograft and lateral meniscus root repair.  Doing very well.  No fever chills or night sweats.  Pain is minimal, no opioid required.  Swelling is improving.    Physical Examination:  21-year-old female alert oriented no apparent distress.  Right knee reveals healing incisions without erythema induration or drainage.  Trace effusion.  4 degrees of hyperextension to 90 degrees of flexion.  Negative Lachman.  Excellent straight leg raise.  No calf tenderness.  Neurovascularly intact.    Impression:  2-1/2-week status post right ACL reconstruction with BTB autograft and lateral meniscal root repair.  Doing very well without evidence of infection or DVT.  Her motion is where we would hope.    Plan:  1. Continue touchdown weightbearing until 6 weeks postop.  Continue range of motion full hyperextension to 90 degrees until 6 weeks postop.  2. Follow-up with me in 4 weeks for routine recheck.        Jean Paul Nieto MD

## 2021-10-06 NOTE — LETTER
Date:October 12, 2021      Patient was self referred, no letter generated. Do not send.        [facility] Health Information

## 2021-10-08 ENCOUNTER — DOCUMENTATION ONLY (OUTPATIENT)
Dept: FAMILY MEDICINE | Facility: CLINIC | Age: 21
End: 2021-10-08

## 2021-10-09 NOTE — PROGRESS NOTES
Chief Complaint:  Postoperative visit right knee    Date of Surgery:  09/17/21    History of Present Illness:  Alessandra is a 21-year-old  who is now 2-1/2 weeks status post right ACL reconstruction with BTB autograft and lateral meniscus root repair.  Doing very well.  No fever chills or night sweats.  Pain is minimal, no opioid required.  Swelling is improving.    Physical Examination:  21-year-old female alert oriented no apparent distress.  Right knee reveals healing incisions without erythema induration or drainage.  Trace effusion.  4 degrees of hyperextension to 90 degrees of flexion.  Negative Lachman.  Excellent straight leg raise.  No calf tenderness.  Neurovascularly intact.    Impression:  2-1/2-week status post right ACL reconstruction with BTB autograft and lateral meniscal root repair.  Doing very well without evidence of infection or DVT.  Her motion is where we would hope.    Plan:  1. Continue touchdown weightbearing until 6 weeks postop.  Continue range of motion full hyperextension to 90 degrees until 6 weeks postop.  2. Follow-up with me in 4 weeks for routine recheck.

## 2021-10-12 NOTE — PROGRESS NOTES
[facility] ATHLETIC MEDICINE  [facility]   Sport Psychology Progress Note      Date of Visit: 10/8/21  Duration of Session: 45-50 minutes    Alessandra Mccormick presented on time for initial telehealth/videoconference. Alessandra Mccormick was located at the following address for the appointment: [facility] in [place].     Emergency contacts provided:  General: Eloy Mccormick, Mom, [phone]  In-person: Rula Bates, [facility], [phone]     The risks and benefits of telehealth and what to do if there is a break in the connection were reviewed. Physical environment/space was confirmed to be secure and private on both ends. The session was both audio and visual on Simple Practice, a secure system that is HIPAA compliant/certified. The telehealth consent form was signed prior to the session; see signed form in chart. The nature and limits of confidentiality, were discussed and Alessandra Mccormick stated understanding and consent.      Suicide Assessment:  Recent suicidal thoughts: No  Past suicidal thoughts: No  Any attempts in the past: No  Any family/friends/loved ones die by suicide: No  Plan or considering various methods: No  Access to guns: No  Protective factors: no h/o suicide attempt, no plan or intent, no h/o risky impulsive behavior and h/o seeking help when needed      Mental Status & Observations:  Alessandra appeared generally alert and oriented. Dress was appropriate to the weather and occasion. Grooming and hygiene were appropriate. Eye contact was good. Speech was of normal volume and normal. Mood was smiling with congruent affect. Thought processes were relevant, logical and goal-directed. Thought content was within normal limits with no evidence of psychotic or paranoid features. Memory appeared intact. Insight and judgment appeared age appropriate with good focus in session.  She exhibited normal motor activity during the appointment.  Behavior was cooperative, open and relaxed.   "    Observations and response to counseling:  Tru presents with good insight re: her injury recovery concerns and notes being more open/honest with herself and others about the help she may need during this time. Naseemt also showcases increased vulnerability and trust within the therapeutic relationship.      Intervention:  Tru prvd update since her last SP appointment and shared returning to campus ~2 weeks about. Tru reported making the difficult decision to stay home from the travel trip even though she was cleared to attend. Clt expressed conflicting thoughts/feelings about relying on others for help and feeling like a \"burden\" to others. With that said, tru shared gratitude for her support network on campus and how people have stepped up to help in her time of need.  Naseemt explored how she can be more mindful of her personal needs day-to-day with celebrating little wins of independence.     Therapy objectives/goals:  Build resilience and response to adversity  Enhance self-advocacy  Enhance self-care  Support injury/recovery    Therapy follow-up plan:  Individual counseling sessions as needed (Bi-monthly)    Tru's next appointment was scheduled for Friday, 10/22 @ 10 AM.      Any Saldivar MA, LPC    "

## 2021-11-05 ENCOUNTER — DOCUMENTATION ONLY (OUTPATIENT)
Dept: FAMILY MEDICINE | Facility: CLINIC | Age: 21
End: 2021-11-05
Payer: COMMERCIAL

## 2021-11-10 ENCOUNTER — OFFICE VISIT (OUTPATIENT)
Dept: ORTHOPEDICS | Facility: CLINIC | Age: 21
End: 2021-11-10
Payer: COMMERCIAL

## 2021-11-10 DIAGNOSIS — Z98.890 S/P ACL RECONSTRUCTION: Primary | ICD-10-CM

## 2021-11-10 NOTE — LETTER
Date:November 15, 2021      Patient was self referred, no letter generated. Do not send.        [facility] Health Information

## 2021-11-10 NOTE — LETTER
11/10/2021      RE: Alessandra Mccormick  [address] [facility] [address]       Chief Complaint:  Postoperative visit right knee    Date of Surgery:  09/17/21    History of Present Illness:  Alessandra is a 21-year-old  now almost 2 months status post right ACL reconstruction with BTB autograft and lateral meniscal root repair.  She is doing very well.  Her motion is slowly improving.  She is slowly advancing her flexion.    Physical Examination:  21-year-old alert oriented in no apparent distress.    Right knee reveals trace effusion.  Range of motion is 4/0/1 10 - Lachman    Impression:  8 weeks status post right ACL reconstruction with BTB autograft and lateral meniscal root repair.  Doing well.  As expected, Alessandra does not have her flexion back.  This will come with time.  We discussed using an exercise bike.    Plan:  1. Continue rehabilitation.  Focus on regaining flexion.  Advance use of exercise bike.  2. Follow-up with me in 4 weeks for recheck of her motion          Jean Paul Nieto MD

## 2021-11-13 NOTE — PROGRESS NOTES
Chief Complaint:  Postoperative visit right knee    Date of Surgery:  09/17/21    History of Present Illness:  Alessandra is a 21-year-old  now almost 2 months status post right ACL reconstruction with BTB autograft and lateral meniscal root repair.  She is doing very well.  Her motion is slowly improving.  She is slowly advancing her flexion.    Physical Examination:  21-year-old alert oriented in no apparent distress.    Right knee reveals trace effusion.  Range of motion is 4/0/1 10 - Lachman    Impression:  8 weeks status post right ACL reconstruction with BTB autograft and lateral meniscal root repair.  Doing well.  As expected, Alessandra does not have her flexion back.  This will come with time.  We discussed using an exercise bike.    Plan:  1. Continue rehabilitation.  Focus on regaining flexion.  Advance use of exercise bike.  2. Follow-up with me in 4 weeks for recheck of her motion

## 2021-11-14 NOTE — PROGRESS NOTES
[facility] ATHLETIC MEDICINE  [facility]   Sport Psychology Progress Note      Date of Visit: 11/5/21  Duration of Session: 45-50 minutes    Alessandra Mccormick presented on time for initial telehealth/videoconference. Alessandra Mccormick was located at the following address for the appointment: [facility] in [place] office.     Emergency contacts provided:  General: Eloy Mccormick, Mom, [phone]  In-person: Rula Bates, [facility], [phone]     The risks and benefits of telehealth and what to do if there is a break in the connection were reviewed. Physical environment/space was confirmed to be secure and private on both ends. The session was both audio and visual on Simple Practice, a secure system that is HIPAA compliant/certified. The telehealth consent form was signed prior to the session; see signed form in chart. The nature and limits of confidentiality, were discussed and Alessandra Mccormick stated understanding and consent.      Suicide Assessment:  Recent suicidal thoughts: No  Past suicidal thoughts: No  Any attempts in the past: No  Any family/friends/loved ones die by suicide: No  Plan or considering various methods: No  Access to guns: No  Protective factors: no h/o suicide attempt, no plan or intent, no h/o risky impulsive behavior and h/o seeking help when needed      Mental Status & Observations:  Alessandra appeared generally alert and oriented. Dress was appropriate to the weather and occasion. Grooming and hygiene were appropriate. Eye contact was good. Speech was of normal volume and normal. Mood was smiling with congruent affect. Thought processes were relevant, logical and goal-directed. Thought content was within normal limits with no evidence of psychotic or paranoid features. Memory appeared intact. Insight and judgment appeared age appropriate with good focus in session.  She exhibited normal motor activity during the appointment.  Behavior was cooperative, open and relaxed.   "    Observations and response to counseling:  Tru continues to present with good insight re: her injury recovery concerns and notes being more open/honest with herself and others about the difficulties she may continue to face with transitioning in this new stage of her recovery process.      Intervention:  Clt prvd update since her last SP appointment and shared she has been off crutches for ~1 week and officially walking again. Clt explored her new sense of freedom and discussed the \"little\" functional behaviors she used to take for granted. Clt processed the end of the season and noted excitement yet hesitancy to come back next year not knowing what her full recovery will bring. Clt/writer discussed expectations for this next step of her recovery and also validated that in between feeling of being able to do certain things but still not compete.     Therapy objectives/goals:  Build resilience and response to adversity  Enhance self-advocacy  Enhance self-care  Support injury/recovery    Therapy follow-up plan:  Individual counseling sessions as needed (Monthly)    Clt's next appointment was scheduled for Friday, 12/3 @ 10 AM.      Any Saldivar MA, LPC  "

## 2021-12-01 NOTE — PROGRESS NOTES
[facility] ATHLETICS  [facility] ATC follow-up note  Date of service performed: 10/8/21    Concern/injury: R ACL/Meniscus    Assessment/plan: After discussing with Dr. Nieto, he has granted permission for Alessandra to begin gait training in the [employer] [place] pool shoulder-depth (minimum weight bearing) and starting weight lifting with her upper body and left leg. This will allow us to begin re-conditioning her for when she is off crutches and is able to be more regularly active.     Sylvia Bates, ATC

## 2021-12-01 NOTE — PROGRESS NOTES
[facility] ATHLETICS  [facility] ATC follow-up note  Date of service performed: 9/20/21    Concern/injury: R knee ACL/Meniscus    Assessment/plan: Today is the first day I am seeing Alessandra since surgery to reconstruct her ACL and repair her lateral meniscus root. She has a significant post-operative effusion, with some bleeding from her anterior incision from patella graft harvest. I was able to remove her post-operative dressings, clean her leg with sterile materials, and place waterproof bandages over her incision sites for protection. We have begun using a compressive sleeve to help alleviate her swelling and improve pain. We started rehab today with quad sets, ankle motion, and attempted assisted straight leg lifts in the training room today. She will go home ([place]) with her mom today and recover for 1 week. I gave her rehab exercises to do at home, as well as instructions to ice and elevate consistently.     Sylvia Bates ATC

## 2021-12-01 NOTE — PROGRESS NOTES
[facility] ATHLETICS  [facility] ATC follow-up note  Date of service performed: 12/1/21    Concern/injury: R ACL/Meniscus    Assessment/plan: Alessandra has been consistent and determined with rehab and with weight room activities to strengthen her unaffected (left leg/core/upper body) areas. We have been able to progress her on the bike to a more cardiac-demanding pace, and she has responded very well to that. We are continuing to progress her rehab and push her flexion so that she is on schedule to start jogging in the pool within the next few weeks. Alessandra's quad is coming back very well, and she has a normal gait. Her knee and scar look excellent. Will continue with rehab.     Sylvia Bates, ATC

## 2021-12-01 NOTE — PROGRESS NOTES
[facility] ATHLETICS  [facility] ATC follow-up note  Date of service performed: 9/30/21    Concern/injury: R ACL/Meniscus    Assessment/plan: AARON.D. has been excellent and consistent with rehab and elevating her leg. Today I removed her sutures in sterile fashion and applied steri-strips to several of her incision sites for protection. She will see Dr. Nieto for her first post-op exam on 10/6. We have been working on swelling control, pain control, quad activation, and knee ROM (0-90 degrees) in rehab daily. Alessandra is responding well and is in good spirits.     Sylvia Bates, ATC

## 2021-12-01 NOTE — PROGRESS NOTES
[facility] ATHLETICS  [facility] ATC follow-up note  Date of service performed: 11/12/21    Concern/injury: R ACL/Meniscus    Assessment/plan: Ath had her 8 week postop evaluation with Dr. Nieto this week. He is pleased with her knee at this time- she is where we expect her to be. We will continue to work on her flexion, as she had a meniscus repair which limited us in rehab during the first 6 weeks. Her extension is excellent and her quad is coming back nicely. We will stay consistent with rehab and incorporate more exercise bike for range of motion.     Sylvia Bates, ATC

## 2021-12-01 NOTE — PROGRESS NOTES
[facility] ATHLETICS  Siva ATC follow-up note  Date of service performed: 10/29/21    Concern/injury: R ACL/ Meniscus    Assessment/plan: JERMAIN. is 6 weeks postop today. She has been diligent in rehab and has shown great improvement. Although she has experienced some muscle atrophy in the right leg, she has gained ROM from -3 to 90 degrees and shows great quad control. Today we practiced walking with two crutches and no brace, and moved to one crutch once A.D. demonstrated a normal gait. I will send her home with a playmaker brace for the weekend, and she is instructed to wear that for support, along with the use of 0-1 crutch as needed through the next few days as she gets accustomed to walking again.     Sylvia Bates ATC

## 2021-12-03 ENCOUNTER — DOCUMENTATION ONLY (OUTPATIENT)
Dept: FAMILY MEDICINE | Facility: CLINIC | Age: 21
End: 2021-12-03
Payer: COMMERCIAL

## 2021-12-07 DIAGNOSIS — Z20.828 EXPOSURE TO INFLUENZA: Primary | ICD-10-CM

## 2021-12-07 RX ORDER — OSELTAMIVIR PHOSPHATE 75 MG/1
75 CAPSULE ORAL DAILY
Qty: 7 CAPSULE | Refills: 0 | Status: SHIPPED | OUTPATIENT
Start: 2021-12-07 | End: 2021-12-14

## 2021-12-08 ENCOUNTER — OFFICE VISIT (OUTPATIENT)
Dept: ORTHOPEDICS | Facility: CLINIC | Age: 21
End: 2021-12-08
Payer: COMMERCIAL

## 2021-12-08 DIAGNOSIS — Z98.890 S/P ACL RECONSTRUCTION: Primary | ICD-10-CM

## 2021-12-08 NOTE — LETTER
Date:December 13, 2021      Patient was self referred, no letter generated. Do not send.        [facility] Health Information

## 2021-12-08 NOTE — LETTER
2021      RE: Alessandra Vo  [address] [facility] [address]       Service Date: 2021    CHIEF COMPLAINT:  Postoperative visit right knee.    DATE OF SURGERY:  2021.    HISTORY OF PRESENT ILLNESS:  Alessandra is a 21-year-old  who is now 3 months status post right ACL reconstruction with BTB autograft and lateral meniscal root repair.  She is doing well.  Her motion is improving.  She has no swelling.    PHYSICAL EXAMINATION:  A 21-year-old female, alert, oriented, in no apparent distress.  Right knee reveals no effusion.  Her range of motion is 4 degrees of hyperextension to 138 degrees of flexion compared to 4 degrees of hyperextension to 140 degrees of flexion on her contralateral knee.  She has a negative Lachman.  She has excellent straight leg raise with developing quadriceps bulk.    IMPRESSION:  Three months status post right ACL reconstruction with BTB autograft and lateral meniscus root repair.  Doing very well.    PLAN:    1.  We can start a very gradual return to run program.  We will start her first on the [employer] treadmill.  She can then advance to using a regular treadmill.  2.  She will continue strength training.  3.  I would like to see her back in 8 weeks for a routine recheck.    Jean Paul Nieto MD        D: 2021   T: 2021   MT: priscilla    Name:     ALESSANDRA VO  MRN:      [id number]        Account:      [id number]   :      2000           Service Date: 2021       Document: [id number]      Jean Paul Nieto MD

## 2021-12-12 NOTE — PROGRESS NOTES
Service Date: 2021    CHIEF COMPLAINT:  Postoperative visit right knee.    DATE OF SURGERY:  2021.    HISTORY OF PRESENT ILLNESS:  Alessandra is a 21-year-old  who is now 3 months status post right ACL reconstruction with BTB autograft and lateral meniscal root repair.  She is doing well.  Her motion is improving.  She has no swelling.    PHYSICAL EXAMINATION:  A 21-year-old female, alert, oriented, in no apparent distress.  Right knee reveals no effusion.  Her range of motion is 4 degrees of hyperextension to 138 degrees of flexion compared to 4 degrees of hyperextension to 140 degrees of flexion on her contralateral knee.  She has a negative Lachman.  She has excellent straight leg raise with developing quadriceps bulk.    IMPRESSION:  Three months status post right ACL reconstruction with BTB autograft and lateral meniscus root repair.  Doing very well.    PLAN:    1.  We can start a very gradual return to run program.  We will start her first on the [employer] treadmill.  She can then advance to using a regular treadmill.  2.  She will continue strength training.  3.  I would like to see her back in 8 weeks for a routine recheck.    Jean Paul Nieto MD        D: 2021   T: 2021   MT: priscilla    Name:     ALESSANDRA VO  MRN:      [id number]        Account:      [id number]   :      2000           Service Date: 2021       Document: [id number]

## 2021-12-16 ENCOUNTER — LAB REQUISITION (OUTPATIENT)
Dept: LAB | Facility: CLINIC | Age: 21
End: 2021-12-16

## 2021-12-16 DIAGNOSIS — Z11.8 ENCOUNTER FOR SCREENING FOR OTHER INFECTIOUS AND PARASITIC DISEASES: ICD-10-CM

## 2021-12-16 DIAGNOSIS — Z01.419 ENCOUNTER FOR GYNECOLOGICAL EXAMINATION (GENERAL) (ROUTINE) WITHOUT ABNORMAL FINDINGS: ICD-10-CM

## 2021-12-16 PROCEDURE — G0123 SCREEN CERV/VAG THIN LAYER: HCPCS | Performed by: NURSE PRACTITIONER

## 2021-12-16 PROCEDURE — 87491 CHLMYD TRACH DNA AMP PROBE: CPT | Performed by: NURSE PRACTITIONER

## 2021-12-17 LAB
C TRACH DNA SPEC QL PROBE+SIG AMP: NEGATIVE
N GONORRHOEA DNA SPEC QL NAA+PROBE: NEGATIVE

## 2021-12-20 LAB
BKR LAB AP GYN ADEQUACY: NORMAL
BKR LAB AP GYN INTERPRETATION: NORMAL
BKR LAB AP HPV REFLEX: NORMAL
BKR LAB AP PREVIOUS ABNORMAL: NORMAL
PATH REPORT.COMMENTS IMP SPEC: NORMAL
PATH REPORT.COMMENTS IMP SPEC: NORMAL
PATH REPORT.RELEVANT HX SPEC: NORMAL

## 2021-12-31 NOTE — PROGRESS NOTES
[facility] ATHLETIC MEDICINE  [facility]   Sport Psychology Progress Note      Date of Visit: 12/3/21  Duration of Session: 45-50 minutes    Alessandra Mccormick presented on time for initial telehealth/videoconference. Alessandra Mccormick was located at the following address for the appointment: [facility] in [place] office.     Emergency contacts provided:  General: Eloy Mccormick, Mom, [phone]  In-person: Rula Bates, [facility], [phone]     The risks and benefits of telehealth and what to do if there is a break in the connection were reviewed. Physical environment/space was confirmed to be secure and private on both ends. The session was both audio and visual on [employer], a secure system that is HIPAA compliant/certified. The telehealth consent form was signed prior to the session; see signed form in chart. The nature and limits of confidentiality, were discussed and Alessandra Mccormick stated understanding and consent.      Suicide Assessment:  Recent suicidal thoughts: No  Past suicidal thoughts: No  Any attempts in the past: No  Any family/friends/loved ones die by suicide: No  Plan or considering various methods: No  Access to guns: No  Protective factors: no h/o suicide attempt, no plan or intent, no h/o risky impulsive behavior and h/o seeking help when needed      Mental Status & Observations:  Alessandra appeared generally alert and oriented. Dress was appropriate to the weather and occasion. Grooming and hygiene were appropriate. Eye contact was good. Speech was of normal volume and normal. Mood was smiling with congruent affect. Thought processes were relevant, logical and goal-directed. Thought content was within normal limits with no evidence of psychotic or paranoid features. Memory appeared intact. Insight and judgment appeared age appropriate with good focus in session.  She exhibited normal motor activity during the appointment.  Behavior was cooperative, open and relaxed.       Observations and response to counseling:  Clt continues to present with good insight re: her injury recovery concerns and being more aware/accepting of the numerous stages (mentally/physically) of her recovery.      Intervention:  Clt prvd update since her last SP appointment and shared successfully navigating the physical milestones of her recovery, but slightly struggling internally with the mental aspect of recovery. Clt shared plans for upcoming break and how she plans to be more consistent with her journaling. The rest of session was devoted to validating the clts feelings and confirming the roller coaster of emotions that take place with injury recovery.     Therapy objectives/goals:  Build resilience and response to adversity  Enhance self-advocacy  Enhance self-care  Support injury/recovery    Therapy follow-up plan:  Individual counseling sessions as needed (Monthly)    Clt's next appointment was scheduled for Friday, 1/14 @ 10 AM.      Any Saldivar MA, LPC

## 2022-01-13 ENCOUNTER — LAB REQUISITION (OUTPATIENT)
Dept: LAB | Facility: CLINIC | Age: 22
End: 2022-01-13

## 2022-01-13 DIAGNOSIS — Z11.8 ENCOUNTER FOR SCREENING FOR OTHER INFECTIOUS AND PARASITIC DISEASES: ICD-10-CM

## 2022-01-13 PROCEDURE — 87491 CHLMYD TRACH DNA AMP PROBE: CPT | Performed by: NURSE PRACTITIONER

## 2022-01-14 ENCOUNTER — DOCUMENTATION ONLY (OUTPATIENT)
Dept: FAMILY MEDICINE | Facility: CLINIC | Age: 22
End: 2022-01-14
Payer: COMMERCIAL

## 2022-01-14 NOTE — PROGRESS NOTES
[facility] ATHLETIC MEDICINE  [facility]   Sport Psychology Progress Note      Date of Visit: 1/14/22  Duration of Session: 20 minutes    Alessandra Mccormick presented on time for initial telehealth/videoconference. Alessandra Mccormick was located at the following address for the appointment: [address] [place] [address]     Emergency contacts provided:  General: Eloy Mccormick, Mom, [phone]  In-person: Rula Bates, [facility], [phone]     The risks and benefits of telehealth and what to do if there is a break in the connection were reviewed. Physical environment/space was confirmed to be secure and private on both ends. The session was both audio and visual on Simple [employer], a secure system that is HIPAA compliant/certified. The telehealth consent form was signed prior to the session; see signed form in chart. The nature and limits of confidentiality, were discussed and Alessandra Mccormick stated understanding and consent.      Suicide Assessment:  Recent suicidal thoughts: No  Past suicidal thoughts: No  Any attempts in the past: No  Any family/friends/loved ones die by suicide: No  Plan or considering various methods: No  Access to guns: No  Protective factors: no h/o suicide attempt, no plan or intent, no h/o risky impulsive behavior and h/o seeking help when needed      Mental Status & Observations:  Alessandra appeared generally alert and oriented. Dress was appropriate to the weather and occasion. Grooming and hygiene were appropriate. Eye contact was good. Speech was of normal volume and normal. Mood was smiling with congruent affect. Thought processes were relevant, logical and goal-directed. Thought content was within normal limits with no evidence of psychotic or paranoid features. Memory appeared intact. Insight and judgment appeared age appropriate with good focus in session.  She exhibited normal motor activity during the appointment.  Behavior was cooperative, open and relaxed.       Observations and response to counseling:  Clt continues to present with good insight re: her injury recovery concerns and being more aware/accepting of the numerous stages (mentally/physically) of her recovery.      Intervention:  Clt prvd update since her last SP appointment and shared having an enjoyable winter break. Clt discussed updates/highlight with her injury recovery and rehab goals. Clt explored excitement for spring semester including new/challenging soccer workouts and her plan to delay practicum/graduation until the fall. Clt noted feeling more connected to the team, progressing successfully in her rehab, and confident in her recovery timeline.    Therapy objectives/goals:  Support injury/recovery  Sport/life balance    Therapy follow-up plan:  Individual counseling sessions as needed (Monthly)    Clt's next appointment was scheduled for Friday, 2/11 @ 3 PM.      Any Saldivar MA, LPC

## 2022-01-16 LAB
C TRACH DNA SPEC QL PROBE+SIG AMP: NEGATIVE
N GONORRHOEA DNA SPEC QL NAA+PROBE: NEGATIVE

## 2022-01-20 NOTE — PROGRESS NOTES
[facility] ATHLETICS  Siva BLACKWELL follow-up note  Date of service performed: 1/8/22    Concern/injury: R knee ACL/Meniscus    Assessment/plan: Alessandra has been progressing very well with rehab and strengthening. Her ROM is coming back WNL and she continues to be diligent with rehab. She saw Dr. Gerson dolan and was cleared to begin return to run program in the [employer]. We will jog in the pool for 4 weeks, and move to treadmill jogging program after that for an additional 4 weeks.     Sylvia Bates ATC

## 2022-01-20 NOTE — PROGRESS NOTES
[facility] ATHLETICS  Siva ATC follow-up note  Date of service performed: 1/4/22    Concern/injury: R knee ACL/Meniscus    Assessment/plan: JEANNIE Has progressed extremely well in the [employer] pool. She has jogged 2x/week for 4 weeks and has continued strength training and rehab. After discussion with Dr. Nieto, per protocol, we will transition Alessandra to jogging on the treadmill this week for the next 4 weeks. She will revisit with Dr. Nieto on 2/2 for clearance to begin jogging on turf.     Sylvia Bates, ATC

## 2022-02-02 ENCOUNTER — OFFICE VISIT (OUTPATIENT)
Dept: ORTHOPEDICS | Facility: CLINIC | Age: 22
End: 2022-02-02
Payer: COMMERCIAL

## 2022-02-02 DIAGNOSIS — Z98.890 S/P ACL RECONSTRUCTION: Primary | ICD-10-CM

## 2022-02-02 NOTE — LETTER
Date:February 10, 2022      Patient was self referred, no letter generated. Do not send.        [facility] Health Information

## 2022-02-02 NOTE — PROGRESS NOTES
[facility] ATHLETICS  Siva ATC follow-up note  Date of service performed: 2/1/22    Concern/injury: R ACL/Meniscus    Assessment/plan: JEANNIE Has progressed very well into jogging program on the treadmill. She has run up to 5 miles in .25mi intervals and has had no pain/swelling. We have been working on DL progressing to SL jumping in ATR during rehab. Right leg is still weak in plyometric ability but gait, mechanics and exercises are done well. F/U with Dr. Nieto tomorrow to discuss jogging on turf.     Sylvia Bates, ATC

## 2022-02-02 NOTE — Clinical Note
2/2/2022      RE: Alessandra Mccormick  [address] [facility] [address]       No notes on file    Jean Paul Nieto MD

## 2022-02-09 NOTE — PROGRESS NOTES
Service Date: 2022    TRAINING ROOM CLINIC NOTE    CHIEF COMPLAINT:  Postoperative visit, right knee.    DATE OF SURGERY:  2021    HISTORY OF PRESENT ILLNESS:  Alessandra is a 21-year-old  who is now almost 4-1/2 months status post right ACL reconstruction and lateral meniscus root repair.  Doing extremely well.  Very little pain.  Her motion is good.    PHYSICAL EXAMINATION:  A 21-year-old female, alert and oriented, in no apparent distress.  Right knee reveals no effusion.  Range of motion 4 degrees of hyperextension to 140 degrees of flexion.  This is symmetrical.  She has a negative Lachman.  Her quadriceps bulk is improving.  She has no tenderness over the graft harvest site.    IMPRESSION:  Four-and-a-half months status post right ACL reconstruction with BTB autograft and lateral meniscus root repair.  Doing well.  Motion is symmetrical.  Her quadriceps strength is improving.  I think we can continue to gradually advance her running.  She can start to run at greater intensity.  She should continue to work on strengthening.    PLAN:    1.  Continue rehabilitation with her .  2.  Start to advance her running and her running intensity.  3.  She can start to do some ball drills, but no reactionary activity.  4.  Follow up with me in 8 weeks for routine recheck.  At that time, we should do a functional test, and we can start adding some more soccer training if she does well in her functional test.    Jean Paul Nieto MD        D: 2022   T: 2022   MT: herman    Name:     ALESSANDRA VO  MRN:      [id number]        Account:      [id number]   :      2000           Service Date: 2022       Document: [id number]

## 2022-03-01 NOTE — PROGRESS NOTES
[facility] ATHLETICS  Siva ATC follow-up note  Date of service performed: 2/28/22    Concern/injury: R ACL/Meniscus    Assessment/plan: Ath continues to progress well. She is gaining confidence with her running drills and jumping/SL work. Will continue to follow protocol and strengthen. Next f/u with Dr. Nieto in March.     Sylvia Bates ATC

## 2022-03-01 NOTE — PROGRESS NOTES
[facility] ATHLETICS  Siva ATC follow-up note  Date of service performed: 2/14/22    Concern/injury: R ACL/Meniscus    Assessment/plan: Ath is doing great with rehab, strengthening and running progression. She does intervals along the sideline of the turf during training, at 25-75% velocity with accel/decel work in between, along with some dribbling through cones and juggling. Her rehab consists of eccentric work, SL jumping and lateral jumping. We are training her right leg to accept decelerating forces and prepare her for functional testing toward the end of March.     Sylvia Bates ATC

## 2022-03-04 ENCOUNTER — DOCUMENTATION ONLY (OUTPATIENT)
Dept: FAMILY MEDICINE | Facility: CLINIC | Age: 22
End: 2022-03-04
Payer: COMMERCIAL

## 2022-03-07 NOTE — PROGRESS NOTES
[facility] ATHLETIC MEDICINE  [facility]   Sport Psychology Progress Note      Date of Visit: 3/4/22  Duration of Session: 25 minutes    Alessandra Mccormick presented on time for initial telehealth/videoconference. Alessandra Mccormick was located at the following address for the appointment: [address] [place] [address]     Emergency contacts provided:  General: Eloy Mccormick, Mom, [phone]  In-person: Rula Bates, [facility], [phone]     The risks and benefits of telehealth and what to do if there is a break in the connection were reviewed. Physical environment/space was confirmed to be secure and private on both ends. The session was both audio and visual on Simple [employer], a secure system that is HIPAA compliant/certified. The telehealth consent form was signed prior to the session; see signed form in chart. The nature and limits of confidentiality, were discussed and Alessandra Mccormick stated understanding and consent.      Suicide Assessment:  Recent suicidal thoughts: No  Past suicidal thoughts: No  Any attempts in the past: No  Any family/friends/loved ones die by suicide: No  Plan or considering various methods: No  Access to guns: No  Protective factors: no h/o suicide attempt, no plan or intent, no h/o risky impulsive behavior and h/o seeking help when needed      Mental Status & Observations:  Alessandra appeared generally alert and oriented. Dress was appropriate to the weather and occasion. Grooming and hygiene were appropriate. Eye contact was good. Speech was of normal volume and normal. Mood was smiling with congruent affect. Thought processes were relevant, logical and goal-directed. Thought content was within normal limits with no evidence of psychotic or paranoid features. Memory appeared intact. Insight and judgment appeared age appropriate with good focus in session.  She exhibited normal motor activity during the appointment.  Behavior was cooperative, open and relaxed.       Observations and response to counseling:  Clt continues to present with good insight re: her injury recovery concerns and being more aware of the increased mental components associated with each stage of her recovery.      Intervention:  Clt prvd update since her last SP appointment and shared continuing to feel more connected to the team, progressing successfully in her rehab, and confident in her recovery timeline. With that said, clt noted increased mental/emotional challenges with her recovery due to challenging stages and/or longer stages of maintenance. Clt/writer discussed the roller coaster of emotions that come with recovery and how certain stages last longer with more progression. Clt shared excitement for going on spring break with her teammates.    Therapy objectives/goals:  Support injury/recovery  Sport/life balance  Coping     Therapy follow-up plan:  Individual counseling sessions as needed (Monthly)    Clt's next appointment was scheduled for Friday, 3/25 @ 3 PM (tentatively in person).      Any Saldivar MA, LPC

## 2022-03-19 NOTE — PROGRESS NOTES
[facility] ATHLETICS  Siva ATC follow-up note  Date of service performed: 3/14/22    Concern/injury: R ACL/Meniscus    Assessment/plan: A.D. continues to progress well in rehab, weights and running program. Will continue to advance speed, deceleration and jumping as tolerated. Next f/u is 3/30 with Dr. Nieto to discuss progressing into change of direction.     Sylvia Bates, ATC

## 2022-03-19 NOTE — PROGRESS NOTES
[facility] ATHLETICS  Siva ATC follow-up note  Date of service performed: 3/7/22    Concern/injury: R ACL/Meniscus    Assessment/plan: AARON.IGGY has been progressing well. We have focused on SL eccentric control, lateral hopping, and landing on R SL in rehab. Ath continues to do an excellent job with running program on the turf.     Sylvia Bates, ATC

## 2022-03-25 ENCOUNTER — DOCUMENTATION ONLY (OUTPATIENT)
Dept: FAMILY MEDICINE | Facility: CLINIC | Age: 22
End: 2022-03-25
Payer: COMMERCIAL

## 2022-03-28 NOTE — PROGRESS NOTES
[facility] ATHLETIC MEDICINE  [facility]   Sport Psychology Progress Note    Location of Visit: [facility] Athletic [facility], Room #111  Date of Visit: 3/25/22  Duration of Session: 50 minutes       Emergency contacts provided:  General: Teodora Brice, [phone]  In-person: Rula Bates, [place], [phone]      Suicide Assessment:  Recent suicidal thoughts: No  Past suicidal thoughts: No  Any attempts in the past: No  Any family/friends/loved ones die by suicide: No  Plan or considering various methods: No  Access to guns: No  Protective factors: no h/o suicide attempt, no plan or intent, no h/o risky impulsive behavior and h/o seeking help when needed      Mental Status & Observations:  Alessandra appeared generally alert and oriented. Dress was appropriate to the weather and occasion. Grooming and hygiene were appropriate. Eye contact was good. Speech was of normal volume and normal. Mood was smiling with congruent affect. Thought processes were relevant, logical and goal-directed. Thought content was within normal limits with no evidence of psychotic or paranoid features. Memory appeared intact. Insight and judgment appeared age appropriate with good focus in session.  She exhibited normal motor activity during the appointment.  Behavior was cooperative, open and relaxed.      Observations and response to counseling:  Clt continues to present with good insight re: her injury recovery concerns and being more aware of the increased mental components associated with each stage of her recovery.      Intervention:  Clt prvd update since her last SP appointment including her spring break plans and transition into spring season. Majority of the time was devoted to the clt processing her current stage of recovery and talking about the ups/downs mentally/emotionally she has experienced. Clt spent time reflecting on the importance of support from various people she has received and how that has impacted her  ability to keep pushing even during the tougher moments. Clt began to process her mental/emotional/physical preparation heading into spring games. Clt discussed future soccer/schooling plans.    Therapy objectives/goals:  Support injury/recovery  Sport/life balance  Coping   Future planning    Therapy follow-up plan:  Individual counseling sessions as needed (Monthly)    Clt's next appointment was scheduled for Friday, 4/15 @ 3 PM (tentatively in person).      Any Saldivar MA, LPC

## 2022-03-29 NOTE — PROGRESS NOTES
Lower Extremity Physical Performance Testing    Surgery/Injury: R ACL reconstruction, lateral meniscus root repair      Involved Extremity: right   Date of Surgery/Injury: 2021    Surgeon/MD: Gerson  Therapist performing test: Rula Bates     Primary Treating Therapist: Rula Bates    Patient subjective symptom/function report: 60%    LSI% =Limb Symmetry Index (score comparison between involved/uninvolved extremity)    Anthropomorphic Measures      Range of Motion Jt. Line Circum. Measurement 15 cm Prox Circum. Measurement   Uninvolved  -3 to 146 degrees 37 cm 52 cm   Involved  -3 to 136 degrees 35 cm 48 cm   Difference   2 cm 4 cm     Evaluation chosen: Return to Function (Level I): Balance and Muscle Strength    Return to Function (Level I): Balance, Muscle Strength   Testing Protocol: Recorded values = best effort of 3 attempts (after 2 practice attempts).    Single Leg Stand and Reach Anterior/Medial Anterior/Lateral   Uninvolved Extremity  cm  cm   Involved Extremity cm    cm   LSI% %  %         Return to Fitness (Level II): Muscle Endurance, Power    Return to Fitness (Level II): Muscle Endurance, Power   Level II Functional Prerequisites:   1) All Level I tests ?85% of uninvolved side or maximal value, and  2) Bilateral squats ?90  knee flexion x 20 reps with good trunk, L/E alignment control.    Perceived Exertion Ratin to 5 point scale, (0) Very Easy << >> (5) Maximal Exertion.  2 verbal cuing episodes allowed for alignment correction before testing terminated.  Only reps completed with good alignment counted toward total reps.    Star Excursion Balance Test Anterior Reach Posteromedial Reach Posterolateral Reach   Uninvolved Extremity  cm  cm  cm   Involved Extremity  cm cm  cm   LSI% % % %     Single Leg Squat Endurance (Reps to 60 KF, 60bpm x 2 minutes) X/60 Reps Percent Return Perceived Exertion   Uninvolved Extremity /60 reps %    Involved Extremity /60 reps %    LSI% %       Single Leg Hop     Uninvolved Extremity  M   Involved Extremity  M   LSI% %     Return to Sport (Level III): Power    Return to Sport (Level III): Power   Level III Functional Prerequisites:   1) All Level I tests ?85% of uninvolved side or maximal value, and  2) All Level II tests ?85% of uninvolved side.    6M Timed Hop    Uninvolved Extremity  seconds   Involved Extremity  seconds   LSI% %     Single Leg Crossover Hop    Uninvolved Extremity  M   Involved Extremity  M   LSI% %           Assessment/Plan:      Exercises Instructed per Test Findings:  1)   2)   3)

## 2022-03-30 ENCOUNTER — OFFICE VISIT (OUTPATIENT)
Dept: ORTHOPEDICS | Facility: CLINIC | Age: 22
End: 2022-03-30
Payer: COMMERCIAL

## 2022-03-30 ENCOUNTER — DOCUMENTATION ONLY (OUTPATIENT)
Dept: FAMILY MEDICINE | Facility: CLINIC | Age: 22
End: 2022-03-30
Payer: COMMERCIAL

## 2022-03-30 DIAGNOSIS — Z98.890 S/P ACL RECONSTRUCTION: Primary | ICD-10-CM

## 2022-03-30 NOTE — PROGRESS NOTES
"Lower Extremity Physical Performance Testing    Surgery/Injury: R ACL Reconstruction, lateral meniscus repair      Involved Extremity: right   Date of Surgery/Injury: DOS 9/17/21 DOI 8/14/21    Surgeon/MD: Gerson  Therapist performing test: Rula Bates     Primary Treating Therapist: Rula Bates    Patient subjective symptom/function report: 60%    LSI% =Limb Symmetry Index (score comparison between involved/uninvolved extremity)    Anthropomorphic Measures      Range of Motion Jt. Line Circum. Measurement 15 cm Prox Circum. Measurement   Uninvolved -3 to 145 degrees 37 cm 47 cm   Involved -3 to 142 degrees 35 cm 43 cm   Difference  2 cm 4 cm     Evaluation chosen: Return to Function (Level I): Balance and Muscle Strength    Return to Function (Level I): Balance, Muscle Strength   Testing Protocol: Recorded values = best effort of 3 attempts (after 2 practice attempts).    Dynamometer Assessment (N) Quadricep Hamstring Adductor (groin) Abductor (hip)   Uninvolved Extremity 66.14 23.82 12.93 11.22   Involved Extremity 50.38   20.50 12.82 12.71   LSI% 76.17% 86.06% 99.15% 100.87 %       SL Step Down    Uninvolved Extremity 12 in.   Involved Extremity 12 in.     LSI% 100%     *Note: on SL step down, R leg had forward trunk lean with 10\" step, forward trunk lean on 12\" step but was able to complete test.     Return to Function (Level I): Core Stability. Allowed at or after 2-4 months post-op ACL     Return to Function (Level I): Core Stability     Progress patient from basic to advanced versions of core poses as strength/endurance/control improves.    [place] Hamstring Curl Peak Force (N)   Uninvolved Extremity 505 N   Involved Extremity 592 N   LSI% 85%       Return to Fitness (Level II): Muscle Endurance, Power    Return to Fitness (Level II): Muscle Endurance, Power   Level II Functional Prerequisites:   1) All Level I tests ?85% of uninvolved side or maximal value, and  2) Bilateral squats ?90  knee flexion x 20 " reps with good trunk, L/E alignment control.    Perceived Exertion Ratin to 5 point scale, (0) Very Easy << >> (5) Maximal Exertion.  2 verbal cuing episodes allowed for alignment correction before testing terminated.  Only reps completed with good alignment counted toward total reps.    Star Excursion Balance Test Anterior Reach Posteromedial Reach Posterolateral Reach   Uninvolved Extremity 50 cm 95.5 cm 78.5 cm   Involved Extremity 40 cm 90.5 cm 85.5 cm   LSI% 80% 95% 109%       Single Leg Hop    Uninvolved Extremity .14 M   Involved Extremity .08 M   LSI% 57%     Return to Sport (Level III): Power    Return to Sport (Level III): Power   Level III Functional Prerequisites:   1) All Level I tests ?85% of uninvolved side or maximal value, and  2) All Level II tests ?85% of uninvolved side.    SL Multi-Rebound Jump Avg force (N)   Uninvolved Extremity 1120   Involved Extremity 928   LSI% 83%     Single Leg Broad Jump (in)   Uninvolved Extremity 74 in   Involved Extremity 66 in   LSI% 89%       SL Triple Jump Distance (in)   Uninvolved Extremity 214in   Involved Extremity 158in   LSI% 74%

## 2022-03-30 NOTE — LETTER
3/30/2022      RE: Alessandra Mccormick  [address] [facility] [address]       Chief Complaint:  Postoperative visit right knee    Date of Surgery:  09/17/21    History of Present Illness:  Alessandra is a 21-year-old  who is now 6 months status post right ACL reconstruction with BTB autograft and lateral meniscal root repair.  She continues to improve.  She ranks her knee is a 65 out of 100.  Her knee feels stable.  She has no swelling.  She has some anterior knee pain.  His biggest complaint is she continues to feel weak.  She did do some strength test with her .    Physical Examination:  21-year-old female alert oriented no apparent distress.  Evaluation of the right knee reveals no effusion.  Range of motion 3 degrees of hyperextension to 138 degrees of flexion compared to 3 degrees of hyperextension on her 40 degrees of flexion on the contralateral knee.  There is a negative Lachman.  Negative pivot shift.  Minimal tenderness at the graft harvest site.    Impression:  6 months status post right ACL reconstruction with BTB autograft and lateral meniscal root repair.  I actually think all on his knee looks quite good today.  Her motion is good she has no effusion.  Her knee is stable.  I do think her confidence will improve as her strength improves.  We discussed the importance of a graduated return.  She can continue to work on soccer drills.  She will advance her soccer activity per her  and strength     Plan:  1. Continue to work on strength and conditioning.  2. Graduated advancement of soccer skill drills  3. Follow-up with me in 8 weeks for a routine recheck.  At that time I had like her to undergo a functional test.        Jean Paul Nieto MD

## 2022-04-11 NOTE — PROGRESS NOTES
[facility]THLET[facility]  [facility] ATC follow-up note  Date of service performed: 4/11/22    Concern/injury: R ACL/Meniscus    Assessment/plan: Athlete had an appt with Dr. Nieto on 3/30 in which we discussed her valgus collapse on SL jumping. I have adjusted her rehab to address this. We will not begin planting/cutting just yet, but will continue to progress ball skills, reaction and running. Athlete is otherwise doing great.     Sylvia Bates, ATC

## 2022-04-11 NOTE — PROGRESS NOTES
[facility]THLET[facility]  [facility] ATC follow-up note  Date of service performed: 3/28/22    Concern/injury: R ACL/Meniscus    Assessment/plan: Athlete has continued to progress well with rehab, strengthening and running programs. [place] has been consistently testing ath on force plate for various jumps and skills, and her R leg data is approx 80-85% strength compared to L leg. She does develop valgus in SL landing positions, so I will adjust her rehab to strengthen glute, load her R leg in a controlled environment and untrain the valgus collapse. We will continue progressing running.     Sylvia Bates ATC

## 2022-04-21 ENCOUNTER — DOCUMENTATION ONLY (OUTPATIENT)
Dept: FAMILY MEDICINE | Facility: CLINIC | Age: 22
End: 2022-04-21
Payer: COMMERCIAL

## 2022-04-21 NOTE — PROGRESS NOTES
[facility] ATHLETIC MEDICINE  [facility]   Sport Psychology Progress Note      Date of Visit: 4/21/22  Duration of Session: 25 minutes    Alessandra Mccormick presented on time for initial telehealth/videoconference. Alessandra Mccormick was located at the following address for the appointment: [address] [place] [id number]     Emergency contacts provided:  General: Eloy Mccormick, Mom, [phone]  In-person: Rula Bates, [facility], [phone]     The risks and benefits of telehealth and what to do if there is a break in the connection were reviewed. Physical environment/space was confirmed to be secure and private on both ends. The session was both audio and visual on [employer], a secure system that is HIPAA compliant/certified. The telehealth consent form was signed prior to the session; see signed form in chart. The nature and limits of confidentiality, were discussed and Alessandra Mccormick stated understanding and consent.      Suicide Assessment:  Recent suicidal thoughts: No  Past suicidal thoughts: No  Any attempts in the past: No  Any family/friends/loved ones die by suicide: No  Plan or considering various methods: No  Access to guns: No  Protective factors: no h/o suicide attempt, no plan or intent, no h/o risky impulsive behavior and h/o seeking help when needed      Mental Status & Observations:  Alessandra appeared generally alert and oriented. Dress was appropriate to the weather and occasion. Grooming and hygiene were appropriate. Eye contact was good. Speech was of normal volume and normal. Mood was smiling with congruent affect. Thought processes were relevant, logical and goal-directed. Thought content was within normal limits with no evidence of psychotic or paranoid features. Memory appeared intact. Insight and judgment appeared age appropriate with good focus in session.  She exhibited normal motor activity during the appointment.  Behavior was cooperative, open and relaxed.       Observations and response to counseling:  Clt continues to present with good insight re: her injury recovery concerns and being more aware of the increased mental components associated with each stage of her recovery.      Intervention:  Clt prvd update since her last SP appointment and shared continuing to feel more connected to the team, progressing successfully in her rehab, and confident in her recovery timeline. Clt also discussed importance of continued support and noted being more direct with thanking people who have helped her on her journey thus far. Clt discussed summer plans with rehab, travel, family, and sport psych services.    Therapy objectives/goals:  Support injury/recovery  Sport/life balance  Coping     Therapy follow-up plan:  Individual counseling sessions as needed (Monthly)    Clt's next appointment was scheduled for Tuesday, 5/17 @ 11 AM (either in person or virtual).      Any Saldivar MA, LPC

## 2022-04-27 NOTE — PROGRESS NOTES
Addended by: Urmila Gaona on: 4/27/2022 02:20 PM     Modules accepted: Orders Chief Complaint:  Postoperative visit right knee    Date of Surgery:  09/17/21    History of Present Illness:  Alessandra is a 21-year-old  who is now 6 months status post right ACL reconstruction with BTB autograft and lateral meniscal root repair.  She continues to improve.  She ranks her knee is a 65 out of 100.  Her knee feels stable.  She has no swelling.  She has some anterior knee pain.  His biggest complaint is she continues to feel weak.  She did do some strength test with her .    Physical Examination:  21-year-old female alert oriented no apparent distress.  Evaluation of the right knee reveals no effusion.  Range of motion 3 degrees of hyperextension to 138 degrees of flexion compared to 3 degrees of hyperextension on her 40 degrees of flexion on the contralateral knee.  There is a negative Lachman.  Negative pivot shift.  Minimal tenderness at the graft harvest site.    Impression:  6 months status post right ACL reconstruction with BTB autograft and lateral meniscal root repair.  I actually think all on his knee looks quite good today.  Her motion is good she has no effusion.  Her knee is stable.  I do think her confidence will improve as her strength improves.  We discussed the importance of a graduated return.  She can continue to work on soccer drills.  She will advance her soccer activity per her  and strength     Plan:  1. Continue to work on strength and conditioning.  2. Graduated advancement of soccer skill drills  3. Follow-up with me in 8 weeks for a routine recheck.  At that time I had like her to undergo a functional test.

## 2022-05-03 NOTE — PROGRESS NOTES
"[facility] ATHLETICS  [facility] ATC follow-up note  Date of service performed: 4/13/22    Concern/injury: R Knee    Assessment/plan: Alessandra has been progressing well with rehab. I adjusted her rehab so that she has \"control\" days with more isometric, bodyweight exercises, and \"plyo\" days with jumping and mechanics work. We will choose her rehab daily based on what work she does in the weight room and on the turf.     Sylvia Bates, ATC      "

## 2022-05-03 NOTE — PROGRESS NOTES
[facility] ATHLET[facility]  [facility] ATC follow-up note  Date of service performed: 4/27/22    Concern/injury: R Knee    Assessment/plan: JEANNIE continues to do well with rehab and lifting/conditioning. She had some swelling in the knee the past couple days, which is improving with ice, elevation and compression. We have modified her training to not stress the knee further. Will continue to modify as needed and progress once swelling resolves.     Sylvia Bates, ATC

## 2022-05-25 ENCOUNTER — OFFICE VISIT (OUTPATIENT)
Dept: ORTHOPEDICS | Facility: CLINIC | Age: 22
End: 2022-05-25
Payer: COMMERCIAL

## 2022-05-25 DIAGNOSIS — Z98.890 S/P KNEE SURGERY: Primary | ICD-10-CM

## 2022-05-25 NOTE — LETTER
5/25/2022    RE: Alessandra Mccormick  [address] [facility] [address]     Dear Colleague,    Thank you for referring your patient, Alessandra Mccormick, to the [facility]. Please see a copy of my visit note below.    Chief Complaint:  Postoperative follow-up right knee    Date of Surgery:  09/17/2021    History of Present Illness:  Alessandra is a 22-year-old  who is now 8 months status post right ACL reconstruction with BTB autograft and lateral meniscal root repair.  Doing very well.  Ranks her knee as above an 80.  She has no pain.  She has no instability.  Denies swelling.  Still feels a bit weak.  She just performed some functional testing and did much better.    Physical Examination:  22-year-old female alert oriented in no apparent distress.  Evaluation of the right knee reveals no effusion.  Symmetrical range of motion.  Negative Lachman.  Negative pivot shift.  No lateral joint line tenderness.  No tenderness at her graft harvest site.  Improving quadricep bulk.    Impression:  8 months status post right ACL reconstruction with BTB autograft and lateral meniscal root repair.  Doing very well.  Alessandra, her , and I had a long conversation about progression to sport.  She should continue strengthening conditioning.  She can start adding more intense soccer drills.  Our goal is to have her ready for full clearance for competitive soccer at the start of the season.  However I explained to Alessandra that she is essentially cleared at this point.    Plan:  1. Continue to work on strength and conditioning  2. Advance soccer drills  3. Follow-up with me in late July    Again, thank you for allowing me to participate in the care of your patient.      Sincerely,    Jean Paul Nieto MD

## 2022-05-25 NOTE — PROGRESS NOTES
[facility] ATHLET[facility]  [facility] ATC follow-up note  Date of service performed: 5/25/22    Concern/injury: R ACL/Meniscus    Assessment/plan: JEANNIE looks excellent lately with her training. She is significantly more confident in loading into R knee. She is able to passively sit back onto her heels into full knee flexion. Has better control with SL jumping and more comfortable knee bending with rehab. She has progressed well with fitness and cutting to 0 degrees. No concerns with her progress thus far.     Sylvia Bates, ATC

## 2022-05-25 NOTE — PROGRESS NOTES
[facility] ATHLETICS  [facility] ATC follow-up note  Date of service performed: 5/11/22    Concern/injury: R ACL/Meniscus    Assessment/plan: JEANNIE continues to progress with no issues. We have been focusing on accepting load into R leg on SL jumps, landing, and decels. Her running mechanics are great, with a slight hitch due to muscle imbalance in R quad compared to L. She continues to do well progressing with cutting, reactive training and loading into R knee.     Sylvia Bates ATC

## 2022-05-27 ENCOUNTER — APPOINTMENT (OUTPATIENT)
Dept: URBAN - METROPOLITAN AREA CLINIC 258 | Age: 22
Setting detail: DERMATOLOGY
End: 2022-05-27

## 2022-05-27 VITALS — HEIGHT: 66 IN | WEIGHT: 140 LBS

## 2022-05-27 DIAGNOSIS — L70.0 ACNE VULGARIS: ICD-10-CM

## 2022-05-27 PROCEDURE — OTHER COUNSELING: OTHER

## 2022-05-27 PROCEDURE — OTHER PRESCRIPTION: OTHER

## 2022-05-27 PROCEDURE — OTHER PRESCRIPTION MEDICATION MANAGEMENT: OTHER

## 2022-05-27 PROCEDURE — OTHER MIPS QUALITY: OTHER

## 2022-05-27 PROCEDURE — 99214 OFFICE O/P EST MOD 30 MIN: CPT

## 2022-05-27 RX ORDER — TRIFAROTENE 50 UG/G
CREAM TOPICAL QHS
Qty: 45 | Refills: 2 | Status: ERX | COMMUNITY
Start: 2022-05-27

## 2022-05-27 RX ORDER — SPIRONOLACTONE 25 MG/1
TABLET, FILM COATED ORAL
Qty: 30 | Refills: 2 | Status: ERX | COMMUNITY
Start: 2022-05-27

## 2022-05-27 ASSESSMENT — LOCATION SIMPLE DESCRIPTION DERM
LOCATION SIMPLE: RIGHT CHEEK
LOCATION SIMPLE: LEFT CHEEK

## 2022-05-27 ASSESSMENT — LOCATION DETAILED DESCRIPTION DERM
LOCATION DETAILED: RIGHT SUPERIOR LATERAL BUCCAL CHEEK
LOCATION DETAILED: LEFT LATERAL BUCCAL CHEEK

## 2022-05-27 ASSESSMENT — LOCATION ZONE DERM: LOCATION ZONE: FACE

## 2022-05-27 NOTE — PROCEDURE: PRESCRIPTION MEDICATION MANAGEMENT
Plan: Current worsening/ flaring x 3-4 months. Previously well controlled following Isotretinoin course in [employer].
Initiate Treatment: Spironolactone 25mg; once daily. Pt tried RX over 5-10 years ago & reports vomiting on medication so she previously discontinued. We will retry this RX at low dosage and increase as tolerable. Call with any ADR. \\nReviewed no pregnancy while on RX. \\nAklief topical; apply nightly (start QOD then increase as tolerable)
Detail Level: Zone
Render In Strict Bullet Format?: No

## 2022-05-27 NOTE — PROCEDURE: COUNSELING
Sarecycline Pregnancy And Lactation Text: This medication is Pregnancy Category D and not consider safe during pregnancy. It is also excreted in breast milk.
Bactrim Counseling:  I discussed with the patient the risks of sulfa antibiotics including but not limited to GI upset, allergic reaction, drug rash, diarrhea, dizziness, photosensitivity, and yeast infections.  Rarely, more serious reactions can occur including but not limited to aplastic anemia, agranulocytosis, methemoglobinemia, blood dyscrasias, liver or kidney failure, lung infiltrates or desquamative/blistering drug rashes.
Spironolactone Pregnancy And Lactation Text: This medication can cause feminization of the male fetus and should be avoided during pregnancy. The active metabolite is also found in breast milk.
Erythromycin Counseling:  I discussed with the patient the risks of erythromycin including but not limited to GI upset, allergic reaction, drug rash, diarrhea, increase in liver enzymes, and yeast infections.
Include Pregnancy/Lactation Warning?: No
Bactrim Pregnancy And Lactation Text: This medication is Pregnancy Category D and is known to cause fetal risk.  It is also excreted in breast milk.
Dapsone Pregnancy And Lactation Text: This medication is Pregnancy Category C and is not considered safe during pregnancy or breast feeding.
Sarecycline Counseling: Patient advised regarding possible photosensitivity and discoloration of the teeth, skin, lips, tongue and gums.  Patient instructed to avoid sunlight, if possible.  When exposed to sunlight, patients should wear protective clothing, sunglasses, and sunscreen.  The patient was instructed to call the office immediately if the following severe adverse effects occur:  hearing changes, easy bruising/bleeding, severe headache, or vision changes.  The patient verbalized understanding of the proper use and possible adverse effects of sarecycline.  All of the patient's questions and concerns were addressed.
Isotretinoin Counseling: Patient should get monthly blood tests, not donate blood, not drive at night if vision affected, not share medication, and not undergo elective surgery for 6 months after tx completed. Side effects reviewed, pt to contact office should one occur.
Erythromycin Pregnancy And Lactation Text: This medication is Pregnancy Category B and is considered safe during pregnancy. It is also excreted in breast milk.
Detail Level: Detailed
High Dose Vitamin A Pregnancy And Lactation Text: High dose vitamin A therapy is contraindicated during pregnancy and breast feeding.
Doxycycline Pregnancy And Lactation Text: This medication is Pregnancy Category D and not consider safe during pregnancy. It is also excreted in breast milk but is considered safe for shorter treatment courses.
Azithromycin Pregnancy And Lactation Text: This medication is considered safe during pregnancy and is also secreted in breast milk.
Dapsone Counseling: I discussed with the patient the risks of dapsone including but not limited to hemolytic anemia, agranulocytosis, rashes, methemoglobinemia, kidney failure, peripheral neuropathy, headaches, GI upset, and liver toxicity.  Patients who start dapsone require monitoring including baseline LFTs and weekly CBCs for the first month, then every month thereafter.  The patient verbalized understanding of the proper use and possible adverse effects of dapsone.  All of the patient's questions and concerns were addressed.
Azelaic Acid Pregnancy And Lactation Text: This medication is considered safe during pregnancy and breast feeding.
Tazorac Pregnancy And Lactation Text: This medication is not safe during pregnancy. It is unknown if this medication is excreted in breast milk.
Tetracycline Counseling: Patient counseled regarding possible photosensitivity and increased risk for sunburn.  Patient instructed to avoid sunlight, if possible.  When exposed to sunlight, patients should wear protective clothing, sunglasses, and sunscreen.  The patient was instructed to call the office immediately if the following severe adverse effects occur:  hearing changes, easy bruising/bleeding, severe headache, or vision changes.  The patient verbalized understanding of the proper use and possible adverse effects of tetracycline.  All of the patient's questions and concerns were addressed. Patient understands to avoid pregnancy while on therapy due to potential birth defects.
Benzoyl Peroxide Pregnancy And Lactation Text: This medication is Pregnancy Category C. It is unknown if benzoyl peroxide is excreted in breast milk.
Topical Sulfur Applications Counseling: Topical Sulfur Counseling: Patient counseled that this medication may cause skin irritation or allergic reactions.  In the event of skin irritation, the patient was advised to reduce the amount of the drug applied or use it less frequently.   The patient verbalized understanding of the proper use and possible adverse effects of topical sulfur application.  All of the patient's questions and concerns were addressed.
Benzoyl Peroxide Counseling: Patient counseled that medicine may cause skin irritation and bleach clothing.  In the event of skin irritation, the patient was advised to reduce the amount of the drug applied or use it less frequently.   The patient verbalized understanding of the proper use and possible adverse effects of benzoyl peroxide.  All of the patient's questions and concerns were addressed.
Topical Sulfur Applications Pregnancy And Lactation Text: This medication is Pregnancy Category C and has an unknown safety profile during pregnancy. It is unknown if this topical medication is excreted in breast milk.
Aklief counseling:  Patient advised to apply a pea-sized amount only at bedtime and wait 30 minutes after washing their face before applying.  If too drying, patient may add a non-comedogenic moisturizer.  The most commonly reported side effects including irritation, redness, scaling, dryness, stinging, burning, itching, and increased risk of sunburn.  The patient verbalized understanding of the proper use and possible adverse effects of retinoids.  All of the patient's questions and concerns were addressed.
Minocycline Counseling: Patient advised regarding possible photosensitivity and discoloration of the teeth, skin, lips, tongue and gums.  Patient instructed to avoid sunlight, if possible.  When exposed to sunlight, patients should wear protective clothing, sunglasses, and sunscreen.  The patient was instructed to call the office immediately if the following severe adverse effects occur:  hearing changes, easy bruising/bleeding, severe headache, or vision changes.  The patient verbalized understanding of the proper use and possible adverse effects of minocycline.  All of the patient's questions and concerns were addressed.
Tazorac Counseling:  Patient advised that medication is irritating and drying.  Patient may need to apply sparingly and wash off after an hour before eventually leaving it on overnight.  The patient verbalized understanding of the proper use and possible adverse effects of tazorac.  All of the patient's questions and concerns were addressed.
Birth Control Pills Pregnancy And Lactation Text: This medication should be avoided if pregnant and for the first 30 days post-partum.
Azelaic Acid Counseling: Patient counseled that medicine may cause skin irritation and to avoid applying near the eyes.  In the event of skin irritation, the patient was advised to reduce the amount of the drug applied or use it less frequently.   The patient verbalized understanding of the proper use and possible adverse effects of azelaic acid.  All of the patient's questions and concerns were addressed.
Aklief Pregnancy And Lactation Text: It is unknown if this medication is safe to use during pregnancy.  It is unknown if this medication is excreted in breast milk.  Breastfeeding women should use the topical cream on the smallest area of the skin for the shortest time needed while breastfeeding.  Do not apply to nipple and areola.
Topical Clindamycin Counseling: Patient counseled that this medication may cause skin irritation or allergic reactions.  In the event of skin irritation, the patient was advised to reduce the amount of the drug applied or use it less frequently.   The patient verbalized understanding of the proper use and possible adverse effects of clindamycin.  All of the patient's questions and concerns were addressed.
Topical Retinoid Pregnancy And Lactation Text: This medication is Pregnancy Category C. It is unknown if this medication is excreted in breast milk.
Doxycycline Counseling:  Patient counseled regarding possible photosensitivity and increased risk for sunburn.  Patient instructed to avoid sunlight, if possible.  When exposed to sunlight, patients should wear protective clothing, sunglasses, and sunscreen.  The patient was instructed to call the office immediately if the following severe adverse effects occur:  hearing changes, easy bruising/bleeding, severe headache, or vision changes.  The patient verbalized understanding of the proper use and possible adverse effects of doxycycline.  All of the patient's questions and concerns were addressed.
Winlevi Pregnancy And Lactation Text: This medication is considered safe during pregnancy and breastfeeding.
Azithromycin Counseling:  I discussed with the patient the risks of azithromycin including but not limited to GI upset, allergic reaction, drug rash, diarrhea, and yeast infections.
Isotretinoin Pregnancy And Lactation Text: This medication is Pregnancy Category X and is considered extremely dangerous during pregnancy. It is unknown if it is excreted in breast milk.
High Dose Vitamin A Counseling: Side effects reviewed, pt to contact office should one occur.
Topical Retinoid counseling:  Patient advised to apply a pea-sized amount only at bedtime and wait 30 minutes after washing their face before applying.  If too drying, patient may add a non-comedogenic moisturizer. The patient verbalized understanding of the proper use and possible adverse effects of retinoids.  All of the patient's questions and concerns were addressed.
Winlevi Counseling:  I discussed with the patient the risks of topical clascoterone including but not limited to erythema, scaling, itching, and stinging. Patient voiced their understanding.
Spironolactone Counseling: Patient advised regarding risks of diarrhea, abdominal pain, hyperkalemia, birth defects (for female patients), liver toxicity and renal toxicity. The patient may need blood work to monitor liver and kidney function and potassium levels while on therapy. The patient verbalized understanding of the proper use and possible adverse effects of spironolactone.  All of the patient's questions and concerns were addressed.
Birth Control Pills Counseling: Birth Control Pill Counseling: I discussed with the patient the potential side effects of OCPs including but not limited to increased risk of stroke, heart attack, thrombophlebitis, deep venous thrombosis, hepatic adenomas, breast changes, GI upset, headaches, and depression.  The patient verbalized understanding of the proper use and possible adverse effects of OCPs. All of the patient's questions and concerns were addressed.
Topical Clindamycin Pregnancy And Lactation Text: This medication is Pregnancy Category B and is considered safe during pregnancy. It is unknown if it is excreted in breast milk.

## 2022-05-29 NOTE — PROGRESS NOTES
Chief Complaint:  Postoperative follow-up right knee    Date of Surgery:  09/17/2021    History of Present Illness:  Alessandra is a 22-year-old  who is now 8 months status post right ACL reconstruction with BTB autograft and lateral meniscal root repair.  Doing very well.  Ranks her knee as above an 80.  She has no pain.  She has no instability.  Denies swelling.  Still feels a bit weak.  She just performed some functional testing and did much better.    Physical Examination:  22-year-old female alert oriented in no apparent distress.  Evaluation of the right knee reveals no effusion.  Symmetrical range of motion.  Negative Lachman.  Negative pivot shift.  No lateral joint line tenderness.  No tenderness at her graft harvest site.  Improving quadricep bulk.    Impression:  8 months status post right ACL reconstruction with BTB autograft and lateral meniscal root repair.  Doing very well.  Alessandra, her , and I had a long conversation about progression to sport.  She should continue strengthening conditioning.  She can start adding more intense soccer drills.  Our goal is to have her ready for full clearance for competitive soccer at the start of the season.  However I explained to Alessandra that she is essentially cleared at this point.    Plan:  1. Continue to work on strength and conditioning  2. Advance soccer drills  3. Follow-up with me in late July

## 2022-06-24 ENCOUNTER — APPOINTMENT (OUTPATIENT)
Dept: URBAN - METROPOLITAN AREA CLINIC 258 | Age: 22
Setting detail: DERMATOLOGY
End: 2022-06-24

## 2022-06-24 VITALS — HEIGHT: 67.5 IN | WEIGHT: 140 LBS

## 2022-06-24 DIAGNOSIS — L70.0 ACNE VULGARIS: ICD-10-CM

## 2022-06-24 PROCEDURE — OTHER PRESCRIPTION MEDICATION MANAGEMENT: OTHER

## 2022-06-24 PROCEDURE — 99213 OFFICE O/P EST LOW 20 MIN: CPT

## 2022-06-24 PROCEDURE — OTHER MIPS QUALITY: OTHER

## 2022-06-24 PROCEDURE — OTHER COUNSELING: OTHER

## 2022-06-24 ASSESSMENT — LOCATION SIMPLE DESCRIPTION DERM
LOCATION SIMPLE: RIGHT CHEEK
LOCATION SIMPLE: LEFT CHEEK

## 2022-06-24 ASSESSMENT — LOCATION ZONE DERM: LOCATION ZONE: FACE

## 2022-06-24 NOTE — PROCEDURE: PRESCRIPTION MEDICATION MANAGEMENT
Continue Regimen: Spironolactone 25mg; once daily. Patient will email in 8 weeks to discuss if she would like to increase dosage to BID\\nAklief topical; apply nightly
Detail Level: Zone
Render In Strict Bullet Format?: No

## 2022-06-24 NOTE — PROCEDURE: COUNSELING
Birth Control Pills Pregnancy And Lactation Text: This medication should be avoided if pregnant and for the first 30 days post-partum.
Erythromycin Pregnancy And Lactation Text: This medication is Pregnancy Category B and is considered safe during pregnancy. It is also excreted in breast milk.
Isotretinoin Counseling: Patient should get monthly blood tests, not donate blood, not drive at night if vision affected, not share medication, and not undergo elective surgery for 6 months after tx completed. Side effects reviewed, pt to contact office should one occur.
Bactrim Counseling:  I discussed with the patient the risks of sulfa antibiotics including but not limited to GI upset, allergic reaction, drug rash, diarrhea, dizziness, photosensitivity, and yeast infections.  Rarely, more serious reactions can occur including but not limited to aplastic anemia, agranulocytosis, methemoglobinemia, blood dyscrasias, liver or kidney failure, lung infiltrates or desquamative/blistering drug rashes.
Winlevi Counseling:  I discussed with the patient the risks of topical clascoterone including but not limited to erythema, scaling, itching, and stinging. Patient voiced their understanding.
Minocycline Pregnancy And Lactation Text: This medication is Pregnancy Category D and not consider safe during pregnancy. It is also excreted in breast milk.
Doxycycline Pregnancy And Lactation Text: This medication is Pregnancy Category D and not consider safe during pregnancy. It is also excreted in breast milk but is considered safe for shorter treatment courses.
Doxycycline Counseling:  Patient counseled regarding possible photosensitivity and increased risk for sunburn.  Patient instructed to avoid sunlight, if possible.  When exposed to sunlight, patients should wear protective clothing, sunglasses, and sunscreen.  The patient was instructed to call the office immediately if the following severe adverse effects occur:  hearing changes, easy bruising/bleeding, severe headache, or vision changes.  The patient verbalized understanding of the proper use and possible adverse effects of doxycycline.  All of the patient's questions and concerns were addressed.
Erythromycin Counseling:  I discussed with the patient the risks of erythromycin including but not limited to GI upset, allergic reaction, drug rash, diarrhea, increase in liver enzymes, and yeast infections.
Include Pregnancy/Lactation Warning?: No
Azithromycin Pregnancy And Lactation Text: This medication is considered safe during pregnancy and is also secreted in breast milk.
High Dose Vitamin A Pregnancy And Lactation Text: High dose vitamin A therapy is contraindicated during pregnancy and breast feeding.
Tetracycline Counseling: Patient counseled regarding possible photosensitivity and increased risk for sunburn.  Patient instructed to avoid sunlight, if possible.  When exposed to sunlight, patients should wear protective clothing, sunglasses, and sunscreen.  The patient was instructed to call the office immediately if the following severe adverse effects occur:  hearing changes, easy bruising/bleeding, severe headache, or vision changes.  The patient verbalized understanding of the proper use and possible adverse effects of tetracycline.  All of the patient's questions and concerns were addressed. Patient understands to avoid pregnancy while on therapy due to potential birth defects.
Dapsone Pregnancy And Lactation Text: This medication is Pregnancy Category C and is not considered safe during pregnancy or breast feeding.
Topical Sulfur Applications Counseling: Topical Sulfur Counseling: Patient counseled that this medication may cause skin irritation or allergic reactions.  In the event of skin irritation, the patient was advised to reduce the amount of the drug applied or use it less frequently.   The patient verbalized understanding of the proper use and possible adverse effects of topical sulfur application.  All of the patient's questions and concerns were addressed.
Benzoyl Peroxide Counseling: Patient counseled that medicine may cause skin irritation and bleach clothing.  In the event of skin irritation, the patient was advised to reduce the amount of the drug applied or use it less frequently.   The patient verbalized understanding of the proper use and possible adverse effects of benzoyl peroxide.  All of the patient's questions and concerns were addressed.
Aklief Pregnancy And Lactation Text: It is unknown if this medication is safe to use during pregnancy.  It is unknown if this medication is excreted in breast milk.  Breastfeeding women should use the topical cream on the smallest area of the skin for the shortest time needed while breastfeeding.  Do not apply to nipple and areola.
Azelaic Acid Counseling: Patient counseled that medicine may cause skin irritation and to avoid applying near the eyes.  In the event of skin irritation, the patient was advised to reduce the amount of the drug applied or use it less frequently.   The patient verbalized understanding of the proper use and possible adverse effects of azelaic acid.  All of the patient's questions and concerns were addressed.
High Dose Vitamin A Counseling: Side effects reviewed, pt to contact office should one occur.
Benzoyl Peroxide Pregnancy And Lactation Text: This medication is Pregnancy Category C. It is unknown if benzoyl peroxide is excreted in breast milk.
Winlevi Pregnancy And Lactation Text: This medication is considered safe during pregnancy and breastfeeding.
Topical Clindamycin Pregnancy And Lactation Text: This medication is Pregnancy Category B and is considered safe during pregnancy. It is unknown if it is excreted in breast milk.
Aklief counseling:  Patient advised to apply a pea-sized amount only at bedtime and wait 30 minutes after washing their face before applying.  If too drying, patient may add a non-comedogenic moisturizer.  The most commonly reported side effects including irritation, redness, scaling, dryness, stinging, burning, itching, and increased risk of sunburn.  The patient verbalized understanding of the proper use and possible adverse effects of retinoids.  All of the patient's questions and concerns were addressed.
Sarecycline Counseling: Patient advised regarding possible photosensitivity and discoloration of the teeth, skin, lips, tongue and gums.  Patient instructed to avoid sunlight, if possible.  When exposed to sunlight, patients should wear protective clothing, sunglasses, and sunscreen.  The patient was instructed to call the office immediately if the following severe adverse effects occur:  hearing changes, easy bruising/bleeding, severe headache, or vision changes.  The patient verbalized understanding of the proper use and possible adverse effects of sarecycline.  All of the patient's questions and concerns were addressed.
Topical Retinoid counseling:  Patient advised to apply a pea-sized amount only at bedtime and wait 30 minutes after washing their face before applying.  If too drying, patient may add a non-comedogenic moisturizer. The patient verbalized understanding of the proper use and possible adverse effects of retinoids.  All of the patient's questions and concerns were addressed.
Minocycline Counseling: Patient advised regarding possible photosensitivity and discoloration of the teeth, skin, lips, tongue and gums.  Patient instructed to avoid sunlight, if possible.  When exposed to sunlight, patients should wear protective clothing, sunglasses, and sunscreen.  The patient was instructed to call the office immediately if the following severe adverse effects occur:  hearing changes, easy bruising/bleeding, severe headache, or vision changes.  The patient verbalized understanding of the proper use and possible adverse effects of minocycline.  All of the patient's questions and concerns were addressed.
Bactrim Pregnancy And Lactation Text: This medication is Pregnancy Category D and is known to cause fetal risk.  It is also excreted in breast milk.
Spironolactone Counseling: Patient advised regarding risks of diarrhea, abdominal pain, hyperkalemia, birth defects (for female patients), liver toxicity and renal toxicity. The patient may need blood work to monitor liver and kidney function and potassium levels while on therapy. The patient verbalized understanding of the proper use and possible adverse effects of spironolactone.  All of the patient's questions and concerns were addressed.
Tazorac Counseling:  Patient advised that medication is irritating and drying.  Patient may need to apply sparingly and wash off after an hour before eventually leaving it on overnight.  The patient verbalized understanding of the proper use and possible adverse effects of tazorac.  All of the patient's questions and concerns were addressed.
Topical Retinoid Pregnancy And Lactation Text: This medication is Pregnancy Category C. It is unknown if this medication is excreted in breast milk.
Azithromycin Counseling:  I discussed with the patient the risks of azithromycin including but not limited to GI upset, allergic reaction, drug rash, diarrhea, and yeast infections.
Tazorac Pregnancy And Lactation Text: This medication is not safe during pregnancy. It is unknown if this medication is excreted in breast milk.
Birth Control Pills Counseling: Birth Control Pill Counseling: I discussed with the patient the potential side effects of OCPs including but not limited to increased risk of stroke, heart attack, thrombophlebitis, deep venous thrombosis, hepatic adenomas, breast changes, GI upset, headaches, and depression.  The patient verbalized understanding of the proper use and possible adverse effects of OCPs. All of the patient's questions and concerns were addressed.
Azelaic Acid Pregnancy And Lactation Text: This medication is considered safe during pregnancy and breast feeding.
Topical Sulfur Applications Pregnancy And Lactation Text: This medication is Pregnancy Category C and has an unknown safety profile during pregnancy. It is unknown if this topical medication is excreted in breast milk.
Spironolactone Pregnancy And Lactation Text: This medication can cause feminization of the male fetus and should be avoided during pregnancy. The active metabolite is also found in breast milk.
Isotretinoin Pregnancy And Lactation Text: This medication is Pregnancy Category X and is considered extremely dangerous during pregnancy. It is unknown if it is excreted in breast milk.
Detail Level: Detailed
Topical Clindamycin Counseling: Patient counseled that this medication may cause skin irritation or allergic reactions.  In the event of skin irritation, the patient was advised to reduce the amount of the drug applied or use it less frequently.   The patient verbalized understanding of the proper use and possible adverse effects of clindamycin.  All of the patient's questions and concerns were addressed.
Dapsone Counseling: I discussed with the patient the risks of dapsone including but not limited to hemolytic anemia, agranulocytosis, rashes, methemoglobinemia, kidney failure, peripheral neuropathy, headaches, GI upset, and liver toxicity.  Patients who start dapsone require monitoring including baseline LFTs and weekly CBCs for the first month, then every month thereafter.  The patient verbalized understanding of the proper use and possible adverse effects of dapsone.  All of the patient's questions and concerns were addressed.

## 2022-07-13 RX ORDER — SPIRONOLACTONE 25 MG/1
TABLET, FILM COATED ORAL
Qty: 60 | Refills: 2 | Status: ERX

## 2022-07-14 NOTE — PROGRESS NOTES
[facility] ATHLETICS  [facility] ATC follow-up note  Date of service performed: 6/24/22    Concern/injury: R knee    Assessment/plan: Alessandra has continued to show improvements on the field and in rehab. Her SL control, plyos, and confidence are visibly improving. She approaches each new challenge with lifting and rehab with excitement and exceeds expectations. No concerns at this time. Will continue progressing into contact and more advanced soccer drills.     Sylvia Bates, ATC

## 2022-07-14 NOTE — PROGRESS NOTES
[facility] ATHLETICS  [facility] ATC follow-up note  Date of service performed: 6/10/22    Concern/injury: R knee    Assessment/plan: Ath has continued to progress as expected with strength training, rehabilitation and fitness. She has done an excellent job and continues to be excited about training. We will continue progressing into technical skills and fitness as tolerated. No concerns at this time.     Sylvia Bates, ATC

## 2022-07-14 NOTE — PROGRESS NOTES
[facility] ATHLETICS  [facility] [place] follow-up note  Date of service performed: 7/8/22    Concern/injury: R knee    Assessment/plan: JEANNIE continues to progress as expected. She has reached a new OH on DL vertical jump each week in weights and continues to improve. She is doing excellent and works hard. We will continue to progress into more soccer training at captain's practices. Alessandra will be a neutral in the grid this week to be involved with no contact. She will work with [facility] on contact in the next week.     Sylvia Bates, ATC

## 2022-08-03 ENCOUNTER — OFFICE VISIT (OUTPATIENT)
Dept: ORTHOPEDICS | Facility: CLINIC | Age: 22
End: 2022-08-03
Payer: COMMERCIAL

## 2022-08-03 VITALS
HEART RATE: 70 BPM | SYSTOLIC BLOOD PRESSURE: 110 MMHG | HEIGHT: 67 IN | DIASTOLIC BLOOD PRESSURE: 78 MMHG | WEIGHT: 144 LBS | BODY MASS INDEX: 22.6 KG/M2

## 2022-08-03 DIAGNOSIS — Z98.890 S/P KNEE SURGERY: Primary | ICD-10-CM

## 2022-08-03 NOTE — LETTER
8/3/2022      RE: Alessandra Mccormick  [address] [facility] [address]     Dear Colleague,    Thank you for referring your patient, Alessandra Mccormick, to the [facility]. Please see a copy of my visit note below.    Chief Complaint:  Postoperative visit, right knee    Date of Surgery:  09/17/2021    History of Present Illness:  Alessandra is a 22-year-old  who is now almost 11 months status post right ACL reconstruction with BTB autograft and lateral meniscal root repair.  She is doing quite well.  She ranks her knee as a 100..  Her knee feels stable.  She denies any pain.  She denies swelling.  She is doing a fair amount of soccer activity.  She is back with the team full-time.  She has been doing some light scrimmaging.    Physical Examination:   22-year-old female alert oriented no apparent distress.  Knee range of motion is symmetrical.  The right knee has no effusion.  She a negative Lachman.  There is a negative pivot shift.  She has excellent quadricep bulk.  She has no tenderness at the graft harvest site.  No medial or lateral joint line tenderness.    Impression:  22-year-old  11 months status post right ACL reconstruction with BTB autograft and lateral meniscus root repair.  She is doing remarkably well.  Ranks her knee is a 100.   states she looks quite good on the field.  We had a long conversation today about full return to sport.  We discussed risks including risk of reinjury.  She understands this.  We discussed using a brace.  I explained the data.  She is not interested in a brace.  All of her questions were answered.    Plan:  1. Alessandra is cleared for full soccer activity.  However we will continue to manage her load and gradually increase her minutes in competitive play.  2. She will follow-up with me at the end of the season for routine recheck.        Again, thank you for allowing me to participate in the care of your patient.       Sincerely,    Jean Paul Nieto MD

## 2022-08-03 NOTE — LETTER
Date:August 8, 2022      Patient was self referred, no letter generated. Do not send.        [facility] Health Information

## 2022-08-06 NOTE — PROGRESS NOTES
Chief Complaint:  Postoperative visit, right knee    Date of Surgery:  09/17/2021    History of Present Illness:  Alessandra is a 22-year-old  who is now almost 11 months status post right ACL reconstruction with BTB autograft and lateral meniscal root repair.  She is doing quite well.  She ranks her knee as a 100..  Her knee feels stable.  She denies any pain.  She denies swelling.  She is doing a fair amount of soccer activity.  She is back with the team full-time.  She has been doing some light scrimmaging.    Physical Examination:   22-year-old female alert oriented no apparent distress.  Knee range of motion is symmetrical.  The right knee has no effusion.  She a negative Lachman.  There is a negative pivot shift.  She has excellent quadricep bulk.  She has no tenderness at the graft harvest site.  No medial or lateral joint line tenderness.    Impression:  22-year-old  11 months status post right ACL reconstruction with BTB autograft and lateral meniscus root repair.  She is doing remarkably well.  Ranks her knee is a 100.   states she looks quite good on the field.  We had a long conversation today about full return to sport.  We discussed risks including risk of reinjury.  She understands this.  We discussed using a brace.  I explained the data.  She is not interested in a brace.  All of her questions were answered.    Plan:  1. Alessandra is cleared for full soccer activity.  However we will continue to manage her load and gradually increase her minutes in competitive play.  2. She will follow-up with me at the end of the season for routine recheck.

## 2022-08-15 NOTE — PROGRESS NOTES
[facility] ATHLETICS  [facility] ATC follow-up note  Date of service performed: 7/18/22    Concern/injury: R ACL/Meniscus    Assessment/plan: JEANNIE has been progressing as expected. She has no restrictions at this time and has been gradually working into contact drills with [facility] and playing as a neutral at captain's practices. We start soccer in 2 weeks and expect Alessandra to train with the team with only modified reps/training load to progress her to full activity.     Sylvia Bates, ATC

## 2022-08-15 NOTE — PROGRESS NOTES
[facility] ATHLETICS  [facility] ATC follow-up note  Date of service performed: 8/15/22    Concern/injury: R ACL/Meniscus    Assessment/plan: JEANNIE played in 45 mins (25 first half and 20 second half) against [employer] on 8/8 and did well. She also played about 75 mins (45 and 30 per half) against [employer] on 8/12 and did great. She felt a little sore after the [employer] game, but no knee pain or swelling, just general post-game soreness. We will continue to progress her training volume the next week up to 90 minutes.     Sylvia Bates, ATC

## 2022-08-15 NOTE — PROGRESS NOTES
[facility] ATHLETICS  [facility] ATC follow-up note  Date of service performed: 7/27/22    Concern/injury: R ACL/Meniscus    Assessment/plan: Alessandra has been doing excellent with contact and training. She will see Dr. Nieto next week for our final clearance check-in to play in exhibition matches the second week of August. Alessandra has done an incredible job handling training loads, increased contact and strength and conditioning.     Sylvia Bates, ATC

## 2022-08-15 NOTE — PROGRESS NOTES
[facility] ATHLETICS  [facility] ATC follow-up note  Date of service performed: 8/3/22    Concern/injury: R ACL/Meniscus    Assessment/plan: Alessandra passed the fitness test on 8/1 with no issues. Upon exam today, Dr. Nieto has cleared her for competitive soccer. We will work her into games with limited volume at first, and progress her playing time as she adjusts to the training loads of playing games.     Sylvia Bates, ATC

## 2022-08-22 NOTE — PROGRESS NOTES
[facility] ATHLETICS  [facility] ATC follow-up note  Date of service performed: 8/22/22    Concern/injury: R ACL/Meniscus    Assessment/plan: JEANNIE has been progressing extremely well and handling higher game loads well. She played 70 mins against [employer] on 8/12/22. She played 90 mins against both [employer] (8/18) and [employer] (8/21) with no issues. She has been feeling great and playing very well.     Sylvia Bates, ATC

## 2022-09-01 ENCOUNTER — DOCUMENTATION ONLY (OUTPATIENT)
Dept: FAMILY MEDICINE | Facility: CLINIC | Age: 22
End: 2022-09-01

## 2022-09-06 NOTE — PROGRESS NOTES
"[facility] ATHLETICS  [facility] ATC initial assessment note  Date of service performed: 9/1/22    Concern: Acute injury  Body part: Ankle  Description: Left  Injury: Sprain  Type: Athletics related  Date of injury: 9/1/22    S: JEANNIE went into a tackle in game against [employer] on 9/1. She got up on her own after several seconds, but I did go on the field to walk her off and begin my eval. No history of ankle injuries. JEANNIE is cleared postop ACL from last fall and has been doing excellent so far this season. Ath denies having felt a \"pop\" or other mechanical symptoms.     O: Visible swelling in lateral ankle around malleolus. TTP of ATFL. Medial and lateral malleolus not TTP. Peoples (-). AROM WNL. PROM WNL. RROM 5/5 all ranges. Anterior Drawer (-) for laxity, posterior drawer (-), Kleiger's (-), squeeze test (-), talar tilt (-).     A: Grade 1 lateral ankle sprain    P: Applied tape and had ath do functional drills (running, cutting, shuffling, backpedaling, sprint/decel) on sideline. Ath appears functional with no antalgic gait and states that it feels good with the tape. Ath played in 23 mins in second half with no issues.     Sylvia Bates, ATC          "

## 2022-09-06 NOTE — PROGRESS NOTES
[facility] ATHLETICS  [facility] ATC follow-up note  Date of service performed: 9/6/22    Concern/injury: L lateral ankle sprain    Assessment/plan: JEANNIE felt a little sore but overall improved on 9/2 after game and initial injury. She has improved each day since injury. We have been focusing on ROM, pain control and ankle strengthening. She has done Faith band for swelling control with [facility] and has shown progress. AARONRICKY played 79 minutes against [employer] on 9/4 with no problems. We will continue to treat, and tape for training/games.     Sylvia Bates ATC

## 2022-09-06 NOTE — PROGRESS NOTES
[facility] ATHLETICS  [facility] ATC follow-up note  Date of service performed: 9/6/22    Concern/injury: R ACLR    Assessment/plan: JEANNIE has been doing excellent both on and off the field. She did suffer a left ankle sprain in game 9/1, but R knee has been great with no concerns.     Sylvia Bates ATC

## 2022-09-22 ENCOUNTER — RX ONLY (RX ONLY)
Age: 22
End: 2022-09-22

## 2022-09-22 RX ORDER — METRONIDAZOLE 7.5 MG/G
CREAM TOPICAL
Qty: 45 | Refills: 1 | Status: ERX | COMMUNITY
Start: 2022-09-22

## 2022-09-30 ENCOUNTER — DOCUMENTATION ONLY (OUTPATIENT)
Dept: FAMILY MEDICINE | Facility: CLINIC | Age: 22
End: 2022-09-30

## 2022-09-30 NOTE — PROGRESS NOTES
[facility] ATHLETIC MEDICINE  [facility]   Sport Psychology Progress Note      Date of Visit: 9/30/22  Duration of Session: 35 minutes    Alessandra Mccormick presented on time for initial telehealth/videoconference. Alessandra Mccormick was located at the following address for the appointment: [address] [place] [phone]     Emergency contacts provided:  General: Eloy Mccormick, Mom, [phone]  In-person: Rula Bates, [facility], [phone]     The risks and benefits of telehealth and what to do if there is a break in the connection were reviewed. Physical environment/space was confirmed to be secure and private on both ends. The session was both audio and visual on Simple [employer], a secure system that is HIPAA compliant/certified. The telehealth consent form was signed prior to the session; see signed form in chart. The nature and limits of confidentiality, were discussed and Alessandra Mccormick stated understanding and consent.      Suicide Assessment:  Recent suicidal thoughts: No  Past suicidal thoughts: No  Any attempts in the past: No  Any family/friends/loved ones die by suicide: No  Plan or considering various methods: No  Access to guns: No  Protective factors: no h/o suicide attempt, no plan or intent, no h/o risky impulsive behavior and h/o seeking help when needed      Mental Status & Observations:  Alessandra appeared generally alert and oriented. Dress was appropriate to the weather and occasion. Grooming and hygiene were appropriate. Eye contact was good. Speech was of normal volume and normal. Mood was smiling with congruent affect. Thought processes were relevant, logical and goal-directed. Thought content was within normal limits with no evidence of psychotic or paranoid features. Memory appeared intact. Insight and judgment appeared age appropriate with good focus in session.  She exhibited normal motor activity during the appointment.  Behavior was cooperative, open and relaxed.   "    Observations and response to counseling:  Tru presents with good insight re: her full injury recovery and notes increased awareness re: her future plans/options.      Intervention:  Clt prvd recap from her summer including family trips and rehab. Clt shared feeling good re: her balance between sport, life and school. Clt stated she was fulling cleared to participate at the end of July. Clt noted minor \"normal\" struggles with her return to play re: the learning curve mentally/physically, but shared overall feeling good and happy to be back on the field. Clt shared conflicting thoughts/feelings re: her choice of returning for a 6th year or not sure to COVID/Injury. Writer prvd information on the [employer] group.     Therapy objectives/goals:  Support injury/recovery  Sport/life balance  Transition out of sport/college    Therapy follow-up plan:  Individual counseling sessions 2x/month    Clt's next appointment was scheduled for Tuesday, 10/18 @ 10:30 AM (virutally)      Any Saldivar MA, LPC  "

## 2022-10-03 NOTE — PROGRESS NOTES
[facility] ATHLETICS  Siva ATC follow-up note  Date of service performed: 9/20/22    Concern/injury: L ankle sprain    Assessment/plan: JEANNIE has been progressing well through L ankle sprain. She has not had setbacks or issues with training or game loads. We did modify her volume for two games to allow her ankle and knee to respond well, and she had no issues. She continues to play 90 mins/game on average. Will continue taping through season for support and continue rehab to maintain strength.     Sylvia Bates, ATC

## 2022-10-03 NOTE — PROGRESS NOTES
[facility] ATHLETICS  Siva BLACKWELL follow-up note  Date of service performed: 10/3/22    Concern/injury: L ankle sprain    Assessment/plan: A.D. has had no issues with ankle the past several weeks. We do continue to tape it for training and games, but she has had no pain, instability or setbacks. Will continue taping and rehab throughout season.     Sylvia Bates ATC

## 2022-10-03 NOTE — PROGRESS NOTES
[facility] ATHLETICS  Siva ATC follow-up note  Date of service performed: 10/3/22    Concern/injury: R Knee    Assessment/plan: JEANNIE has been doing excellent this season with her knee. She has been tolerating training and games, and typically plays 90 mins every game. Her ankle has been feeling great as well. Will continue rehab to maintain strength and mobility. JERMAINEloise also does knee warm up in weight room several days a week. No concerns at this time.     Sylvia Bates ATC

## 2022-10-12 ENCOUNTER — RX ONLY (RX ONLY)
Age: 22
End: 2022-10-12

## 2022-10-12 RX ORDER — DOXYCYCLINE HYCLATE 50 MG/1
CAPSULE, GELATIN COATED ORAL
Qty: 50 | Refills: 0 | Status: ERX | COMMUNITY
Start: 2022-10-12

## 2022-10-12 RX ORDER — PIMECROLIMUS 10 MG/G
CREAM TOPICAL
Qty: 60 | Refills: 0 | Status: ERX | COMMUNITY
Start: 2022-10-12

## 2022-10-13 ENCOUNTER — RX ONLY (RX ONLY)
Age: 22
End: 2022-10-13

## 2022-10-13 RX ORDER — PIMECROLIMUS 10 MG/G
CREAM TOPICAL
Qty: 60 | Refills: 0 | Status: ERX

## 2022-10-18 ENCOUNTER — DOCUMENTATION ONLY (OUTPATIENT)
Dept: FAMILY MEDICINE | Facility: CLINIC | Age: 22
End: 2022-10-18

## 2022-10-21 NOTE — PROGRESS NOTES
[facility] ATHLETIC MEDICINE  [facility]   Sport Psychology Progress Note      Date of Visit: 9/30/22  Duration of Session: 50 minutes    Alessandra Mccormick presented on time for initial telehealth/videoconference. Alessandra Mccormick was located at the following address for the appointment: [address] [place] [phone]     Emergency contacts provided:  General: Eloy Mccormick, Mom, [phone]  In-person: Rula Bates, [facility], [phone]     The risks and benefits of telehealth and what to do if there is a break in the connection were reviewed. Physical environment/space was confirmed to be secure and private on both ends. The session was both audio and visual on Simple [employer], a secure system that is HIPAA compliant/certified. The telehealth consent form was signed prior to the session; see signed form in chart. The nature and limits of confidentiality, were discussed and Alessandra Mccormick stated understanding and consent.      Suicide Assessment:  Recent suicidal thoughts: No  Past suicidal thoughts: No  Any attempts in the past: No  Any family/friends/loved ones die by suicide: No  Plan or considering various methods: No  Access to guns: No  Protective factors: no h/o suicide attempt, no plan or intent, no h/o risky impulsive behavior and h/o seeking help when needed      Mental Status & Observations:  Alessandra appeared generally alert and oriented. Dress was appropriate to the weather and occasion. Grooming and hygiene were appropriate. Eye contact was good. Speech was of normal volume and normal. Mood was smiling with congruent affect. Thought processes were relevant, logical and goal-directed. Thought content was within normal limits with no evidence of psychotic or paranoid features. Memory appeared intact. Insight and judgment appeared age appropriate with good focus in session.  She exhibited normal motor activity during the appointment.  Behavior was cooperative, open and relaxed.       Observations and response to counseling:  Clt continues to present with good insight re: her injury recovery and notes maintained success/perspective. Currently, clt notes minor struggles with deciding if she would like to come back for a 5th year.      Intervention:  Clt prvd update since her last session including school, soccer, and life. Most of the session was devoted to the clt processing her thoughts/feelings re: a 5th year option and outlining the pros/cons of her options.    Therapy objectives/goals:  Support injury/recovery  Sport/life balance  Transition out of sport/college  Career/life planning    Therapy follow-up plan:  Individual counseling sessions 2x/month    Clt's next appointment was scheduled for Friday, 11/4 @ 8 AM.      Any Saldivar MA, LPC

## 2022-11-02 NOTE — PROGRESS NOTES
[facility] ATHLETICS  Siva ATC follow-up note  Date of service performed: 11/2/22    Concern/injury: R ACLR     Assessment/plan: JEANNIE completed the soccer season with minimal issues. She experienced knee swelling a couple times toward the end of the season, which we managed with the use of BFR, compex, and Normatec boots. She was able to play in all games with no restrictions. We will have an end of season/career follow up appt with Dr. Nieto on 11/9/22.     Sylvia Bates ATC

## 2022-11-04 ENCOUNTER — DOCUMENTATION ONLY (OUTPATIENT)
Dept: FAMILY MEDICINE | Facility: CLINIC | Age: 22
End: 2022-11-04

## 2022-11-04 NOTE — PROGRESS NOTES
[facility] ATHLETIC MEDICINE  [facility]   Sport Psychology Progress Note      Date of Visit: 11/4/22  Duration of Session: 30 minutes    Alessandra Mccormick presented on time for initial telehealth/videoconference. Alessandra Mccormick was located at the following address for the appointment: [address] [place] [phone]     Emergency contacts provided:  General: Eloy Mccormick, Mom, [phone]  In-person: Rula Bates, [facility], [phone]     The risks and benefits of telehealth and what to do if there is a break in the connection were reviewed. Physical environment/space was confirmed to be secure and private on both ends. The session was both audio and visual on Simple Practice, a secure system that is HIPAA compliant/certified. The telehealth consent form was signed prior to the session; see signed form in chart. The nature and limits of confidentiality, were discussed and Alessandra Mccormick stated understanding and consent.      Suicide Assessment:  Recent suicidal thoughts: No  Past suicidal thoughts: No  Any attempts in the past: No  Any family/friends/loved ones die by suicide: No  Plan or considering various methods: No  Access to guns: No  Protective factors: no h/o suicide attempt, no plan or intent, no h/o risky impulsive behavior and h/o seeking help when needed      Mental Status & Observations:  Alessandra appeared generally alert and oriented. Dress was appropriate to the weather and occasion. Grooming and hygiene were appropriate. Eye contact was good. Speech was of normal volume and normal. Mood was smiling with congruent affect. Thought processes were relevant, logical and goal-directed. Thought content was within normal limits with no evidence of psychotic or paranoid features. Memory appeared intact. Insight and judgment appeared age appropriate with good focus in session.  She exhibited normal motor activity during the appointment.  Behavior was cooperative, open and relaxed.       Observations and response to counseling:  Clt maintains good perspective re: her transition out of  life and notes pride for how she ended her career along with excitement for what's next.      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 Clt shared details re: the decision conversation with her coaches and noted feeling heard and supported with her choice. Clt noted plans for upcoming spring and summer. Clt/writer discussed the importance of creating a new routine and finding balance between school, work, life and friends/family.    Therapy objectives/goals:  Sport/life balance  Transition out of sport/college  Career/life planning    Therapy follow-up plan:  Individual counseling sessions: Monthly Check-Ins    Clt's next appointment was scheduled for Friday, 12/9 @ 8 AM      Any Saldivar MA, LPC

## 2022-11-08 ENCOUNTER — APPOINTMENT (OUTPATIENT)
Dept: URBAN - METROPOLITAN AREA CLINIC 258 | Age: 22
Setting detail: DERMATOLOGY
End: 2022-11-09

## 2022-11-08 VITALS — WEIGHT: 140 LBS | HEIGHT: 67 IN

## 2022-11-08 DIAGNOSIS — L70.0 ACNE VULGARIS: ICD-10-CM

## 2022-11-08 DIAGNOSIS — Z79.899 OTHER LONG TERM (CURRENT) DRUG THERAPY: ICD-10-CM

## 2022-11-08 DIAGNOSIS — L71.0 PERIORAL DERMATITIS: ICD-10-CM

## 2022-11-08 PROCEDURE — OTHER PRESCRIPTION MEDICATION MANAGEMENT: OTHER

## 2022-11-08 PROCEDURE — OTHER OTHER: OTHER

## 2022-11-08 PROCEDURE — 36415 COLL VENOUS BLD VENIPUNCTURE: CPT

## 2022-11-08 PROCEDURE — OTHER ORDER TESTS: OTHER

## 2022-11-08 PROCEDURE — OTHER URINE PREGNANCY TEST: OTHER

## 2022-11-08 PROCEDURE — OTHER COUNSELING: OTHER

## 2022-11-08 PROCEDURE — OTHER HIGH RISK MEDICATION MONITORING: OTHER

## 2022-11-08 PROCEDURE — OTHER VENIPUNCTURE: OTHER

## 2022-11-08 PROCEDURE — 99214 OFFICE O/P EST MOD 30 MIN: CPT

## 2022-11-08 PROCEDURE — 81025 URINE PREGNANCY TEST: CPT

## 2022-11-08 PROCEDURE — OTHER ISOTRETINOIN INITIATION: OTHER

## 2022-11-08 PROCEDURE — OTHER MIPS QUALITY: OTHER

## 2022-11-08 ASSESSMENT — LOCATION DETAILED DESCRIPTION DERM
LOCATION DETAILED: LEFT CENTRAL MALAR CHEEK
LOCATION DETAILED: LEFT INFERIOR CENTRAL MALAR CHEEK
LOCATION DETAILED: LEFT ANTECUBITAL SKIN

## 2022-11-08 ASSESSMENT — LOCATION SIMPLE DESCRIPTION DERM
LOCATION SIMPLE: LEFT CHEEK
LOCATION SIMPLE: LEFT UPPER ARM

## 2022-11-08 ASSESSMENT — LOCATION ZONE DERM
LOCATION ZONE: FACE
LOCATION ZONE: ARM

## 2022-11-08 NOTE — PROCEDURE: HIGH RISK MEDICATION MONITORING
Xelmerlinz Pregnancy And Lactation Text: This medication is Pregnancy Category D and is not considered safe during pregnancy.  The risk during breast feeding is also uncertain.

## 2022-11-08 NOTE — PROCEDURE: OTHER
Render Risk Assessment In Note?: no
Other (Free Text): No PMH of depression or suicidal thoughts\\nNo PMH or FH of IBD\\nIUD & condoms as contraception
Detail Level: Zone
Note Text (......Xxx Chief Complaint.): This diagnosis correlates with the

## 2022-11-08 NOTE — PROCEDURE: HIGH RISK MEDICATION MONITORING
ED Nurse Note:



Discharge instructions given to pt. Answered all questions. Verbalized 
understanding. No acute distress noted. ID band removed. Left ER w/ steady gait 
and all belongings. Topical Clindamycin Pregnancy And Lactation Text: This medication is Pregnancy Category B and is considered safe during pregnancy. It is unknown if it is excreted in breast milk.

## 2022-11-08 NOTE — PROCEDURE: PRESCRIPTION MEDICATION MANAGEMENT
Render In Strict Bullet Format?: No
Detail Level: Zone
Continue Regimen: Doxycycline 100mg once daily for additional 2 weeks then D/C in preparation for isotretinoin start\\nElidel topical BID until follow-up

## 2022-11-08 NOTE — PROCEDURE: HIGH RISK MEDICATION MONITORING
WDL Cimzia Pregnancy And Lactation Text: This medication crosses the placenta but can be considered safe in certain situations. Cimzia may be excreted in breast milk.

## 2022-11-09 ENCOUNTER — OFFICE VISIT (OUTPATIENT)
Dept: ORTHOPEDICS | Facility: CLINIC | Age: 22
End: 2022-11-09
Payer: COMMERCIAL

## 2022-11-09 ENCOUNTER — DOCUMENTATION ONLY (OUTPATIENT)
Dept: FAMILY MEDICINE | Facility: CLINIC | Age: 22
End: 2022-11-09

## 2022-11-09 VITALS — WEIGHT: 140 LBS | HEIGHT: 67 IN | BODY MASS INDEX: 21.97 KG/M2

## 2022-11-09 DIAGNOSIS — M22.41 CHONDROMALACIA OF PATELLA, RIGHT: ICD-10-CM

## 2022-11-09 DIAGNOSIS — M25.561 RIGHT KNEE PAIN, UNSPECIFIED CHRONICITY: Primary | ICD-10-CM

## 2022-11-09 DIAGNOSIS — M25.561 ACUTE PAIN OF RIGHT KNEE: Primary | ICD-10-CM

## 2022-11-09 NOTE — LETTER
11/9/2022      RE: Alessandra Mccormick  [address] [facility] [address]     Dear Colleague,    Thank you for referring your patient, Alessandra Mccormick, to the [facility] STUDENT ATHLETIC CLINIC. Please see a copy of my visit note below.    Chief Complaint:  Post operative visit, right knee    Date of Surgery:  09/17/20212    History of Present Illness:  Alessandra is a 22-year-old  who is now 14 months status post right anterior cruciate ligament reconstruction with BTB autograft and lateral meniscus root repair.  At our last visit she rated her knee as a 100 and she was doing quite well.  However over the last few weeks she has had more pain.  Her pain is deep inside her knee.  She thinks it is mostly anterolateral.  She has no swelling.  Her knee feels stable.  She now ranks her knee is a 50 out of 100.    Physical Examination:  22-year-old female alert oriented no apparent distress.  Range of motion 2/0/140 which is symmetrical.  The right knee does have a trace to 1+ effusion.  She has slight medial joint line tenderness.  Slight lateral joint line tenderness.  There is a negative Lachman.  Negative pivot shift.  She does have some patellofemoral crepitation.    Impression:  14 months status post right ACL reconstruction with BTB autograft and lateral meniscus root repair.  Mobic concerned about Alessandra's knee.  She has an effusion.  I think it would be reasonable to obtain a new MRI.  Agrees.    Plan:  1. Right MRI to evaluate her articular cartilage surfaces and menisci  2. Voltaren and as needed for pain and swelling  3. Follow-up after the MRI to discuss the results.        Again, thank you for allowing me to participate in the care of your patient.      Sincerely,    Jean Paul Nieto MD

## 2022-11-09 NOTE — LETTER
Date:November 14, 2022      Patient was self referred, no letter generated. Do not send.         Health Information

## 2022-11-10 ENCOUNTER — ANCILLARY PROCEDURE (OUTPATIENT)
Dept: MRI IMAGING | Facility: CLINIC | Age: 22
End: 2022-11-10
Attending: ORTHOPAEDIC SURGERY
Payer: COMMERCIAL

## 2022-11-10 DIAGNOSIS — M22.41 CHONDROMALACIA OF PATELLA, RIGHT: ICD-10-CM

## 2022-11-10 DIAGNOSIS — M25.561 RIGHT KNEE PAIN, UNSPECIFIED CHRONICITY: ICD-10-CM

## 2022-11-10 PROCEDURE — 73721 MRI JNT OF LWR EXTRE W/O DYE: CPT | Mod: RT | Performed by: RADIOLOGY

## 2022-11-13 NOTE — PROGRESS NOTES
Chief Complaint:  Post operative visit, right knee    Date of Surgery:  09/17/20212    History of Present Illness:  Alessandra is a 22-year-old  who is now 14 months status post right anterior cruciate ligament reconstruction with BTB autograft and lateral meniscus root repair.  At our last visit she rated her knee as a 100 and she was doing quite well.  However over the last few weeks she has had more pain.  Her pain is deep inside her knee.  She thinks it is mostly anterolateral.  She has no swelling.  Her knee feels stable.  She now ranks her knee is a 50 out of 100.    Physical Examination:  22-year-old female alert oriented no apparent distress.  Range of motion 2/0/140 which is symmetrical.  The right knee does have a trace to 1+ effusion.  She has slight medial joint line tenderness.  Slight lateral joint line tenderness.  There is a negative Lachman.  Negative pivot shift.  She does have some patellofemoral crepitation.    Impression:  14 months status post right ACL reconstruction with BTB autograft and lateral meniscus root repair.  Wallace concerned about Alessandra's knee.  She has an effusion.  I think it would be reasonable to obtain a new MRI.  Agrees.    Plan:  1. Right MRI to evaluate her articular cartilage surfaces and menisci  2. Voltaren and as needed for pain and swelling  3. Follow-up after the MRI to discuss the results.

## 2022-11-14 ENCOUNTER — OFFICE VISIT (OUTPATIENT)
Dept: ORTHOPEDICS | Facility: CLINIC | Age: 22
End: 2022-11-14
Payer: COMMERCIAL

## 2022-11-14 VITALS — HEIGHT: 67 IN | WEIGHT: 140 LBS | BODY MASS INDEX: 21.97 KG/M2

## 2022-11-14 DIAGNOSIS — M25.561 ACUTE PAIN OF RIGHT KNEE: Primary | ICD-10-CM

## 2022-11-14 NOTE — LETTER
11/14/2022      RE: Aelssandra Mccormick  [address] [facility] [address]     Dear Colleague,    Thank you for referring your patient, Alessandra Mccormick, to the [facility] STUDENT ATHLETIC CLINIC. Please see a copy of my visit note below.    Service Date: 11/14/2022    CLINIC NOTE    CHIEF COMPLAINT:  Followup right knee pain and review MRI.    DATE OF SURGERY:  09/17/2021    HISTORY OF PRESENT ILLNESS:  Alessandra is a 22-year-old [place]  who is approximately 14 months status post right ACL reconstruction of the lateral meniscal root repair.  She has had some persistent symptoms of pain and swelling.  We elected to obtain an MRI.  She is here to follow up the results.  No significant change in her symptoms from her last visit.    MRI RESULTS:  I personally reviewed the patient's MRI and MRI report.  The ACL graft is intact.  There is chondral wear of the patellofemoral articulation.  There is some signal in the posterior horn of the lateral meniscus concerning for retear.  There is signal at the meniscal capsular junction of the medial meniscus that is new.    IMPRESSION:  Alessandra Mccormick is a 22-year-old , status post right ACL reconstruction with pain and swelling.  I explained to Alessandra that she has some chondral wear that may explain her symptoms.  I am a bit more concerned about her medial meniscus.  I explained that if her meniscus is truly torn, it would be worthwhile to repair her meniscus to maintain the meniscus tissue.  It is difficult for me to say if this is a repairable meniscus.  We discussed a diagnostic arthroscopy.  I explained that we would place the arthroscope in her knee to evaluate her articular cartilage surfaces of both her medial and lateral menisci.  We would perform a debridement of her cartilage and then either a repair or a debridement of her meniscal tissue.  I discussed the risks of surgery, including bleeding, infection, nerve damage,  "complications from anesthesia, blood clot, etc.  I also explained the more pertinent risks with this type of surgery, including failure to relieve her symptoms.  I explained that the surgery does not \"cure\" her cartilage wear.  This could be problematic over time.  If we repair her meniscus, the meniscus may not heal, requiring additional surgery.  There is a possibility of loss of motion or scar tissue.  She may be unable to return to her desired level of activity.  Her questions were answered.    PLAN:    1.  We will schedule the patient for a right knee arthroscopy, chondroplasty, lateral meniscus repair versus debridement, medial meniscus repair versus debridement.  We will need to look very closely at her meniscal capsular junction on the medial side.  2.  She will need a preoperative history and physical.    Jean Paul Nieto MD        D: 2022   T: 2022   MT: herman    Name:     KAYLIE VO  MRN:      [id number]        Account:      [id number]   :      2000           Service Date: 2022       Document: [id number]      Again, thank you for allowing me to participate in the care of your patient.      Sincerely,    Jean Paul Nieto MD    "

## 2022-11-14 NOTE — LETTER
Date:November 21, 2022      Patient was self referred, no letter generated. Do not send.        [facility] Health Information

## 2022-11-16 NOTE — PROGRESS NOTES
"Service Date: 11/14/2022    CLINIC NOTE    CHIEF COMPLAINT:  Followup right knee pain and review MRI.    DATE OF SURGERY:  09/17/2021    HISTORY OF PRESENT ILLNESS:  Alessandra is a 22-year-old [place]  who is approximately 14 months status post right ACL reconstruction of the lateral meniscal root repair.  She has had some persistent symptoms of pain and swelling.  We elected to obtain an MRI.  She is here to follow up the results.  No significant change in her symptoms from her last visit.    MRI RESULTS:  I personally reviewed the patient's MRI and MRI report.  The ACL graft is intact.  There is chondral wear of the patellofemoral articulation.  There is some signal in the posterior horn of the lateral meniscus concerning for retear.  There is signal at the meniscal capsular junction of the medial meniscus that is new.    IMPRESSION:  Alessandra Mccormick is a 22-year-old , status post right ACL reconstruction with pain and swelling.  I explained to Alessandra that she has some chondral wear that may explain her symptoms.  I am a bit more concerned about her medial meniscus.  I explained that if her meniscus is truly torn, it would be worthwhile to repair her meniscus to maintain the meniscus tissue.  It is difficult for me to say if this is a repairable meniscus.  We discussed a diagnostic arthroscopy.  I explained that we would place the arthroscope in her knee to evaluate her articular cartilage surfaces of both her medial and lateral menisci.  We would perform a debridement of her cartilage and then either a repair or a debridement of her meniscal tissue.  I discussed the risks of surgery, including bleeding, infection, nerve damage, complications from anesthesia, blood clot, etc.  I also explained the more pertinent risks with this type of surgery, including failure to relieve her symptoms.  I explained that the surgery does not \"cure\" her cartilage wear.  This could be problematic over " time.  If we repair her meniscus, the meniscus may not heal, requiring additional surgery.  There is a possibility of loss of motion or scar tissue.  She may be unable to return to her desired level of activity.  Her questions were answered.    PLAN:    1.  We will schedule the patient for a right knee arthroscopy, chondroplasty, lateral meniscus repair versus debridement, medial meniscus repair versus debridement.  We will need to look very closely at her meniscal capsular junction on the medial side.  2.  She will need a preoperative history and physical.    Jean Paul Nieto MD        D: 2022   T: 2022   MT: herman    Name:     KAYLIE VOEloise  MRN:      [id number]        Account:      [id number]   :      2000           Service Date: 2022       Document: [id number]

## 2022-12-03 NOTE — PROGRESS NOTES
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 She tore her R ACL last year and had reconstructive surgery with Dr. Nieto on 9/17/21 and was cleared for full competition in July 2022 after full rehabilitation and RTP. 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 She had a couple instances of knee swelling throughout the season, which we were able to control with the use of BFR, Compex, firefly, Normatec and Anglican band. A.D. did not have any pain or mechanical symptoms while playing, only occasional swelling. Today, pt has moderate effusion and had sharp pain when trying to go for a run this past weekend.     O: Knee effusion without deformity. Denies TTP of all landmarks. AROM WNL. PROM WNL with some stiffness at end range flexion, likely due to swelling. Lachman's (-), Anterior drawer (-), posterior drawer (-), Valgus stress (-), Varus stress (-), Amy's (-), Apley's (-), patellar glides AP/Lat (+) crepitus without pain.     A: R knee effusion, likely some trochlear cartilage damage within PF joint.     P: Refer to Dr. Nieto for further evaluation, possible imaging. Started athlete on Mediproxen BID.     Sylvia Bates, ATC

## 2022-12-03 NOTE — PROGRESS NOTES
[facility] ATHLETICS  [facility] ATC follow-up note  Date of service performed: 11/14/22    Concern/injury: R Medial Meniscus Tear    Assessment/plan: JEANNIE met with Dr. Nieto this evening to discuss her recent imaging. Dr. Nieto explained that her medial meniscus has a tear where it attaches to the capsule, which would heal best if it gets repaired. If he is to do the surgery, he can clean up some cartilage damage within PF joint and trochlea, and repair medial meniscus. JEANNIE would like to go ahead with arthroscopic procedure. Surgery will be scheduled at [facility] for 12/23/22.     Sylvia Bates ATC

## 2022-12-03 NOTE — PROGRESS NOTES
[facility] ATHLETICS  [facility] follow-up note  Date of service performed: 11/10/22    Concern/injury: R knee effusion/PF pain    Assessment/plan: A.D. was evaluated by Dr. Nieto in [facility] yesterday, and we obtained MRI to further evaluate R knee. MRI reveals medial meniscus tear and PF cartilage damage. Will have A.D. see Dr. Nieto again to talk about her recent imaging and decide on next steps.     Sylvia Bates, ATC

## 2022-12-09 ENCOUNTER — DOCUMENTATION ONLY (OUTPATIENT)
Dept: FAMILY MEDICINE | Facility: CLINIC | Age: 22
End: 2022-12-09

## 2022-12-09 NOTE — PROGRESS NOTES
[facility] ATHLETIC MEDICINE  [facility]   Sport Psychology Progress Note      Date of Visit: 12/9/22  Duration of Session: 45 minutes    Alessandra Mccormick presented on time for initial telehealth/videoconference. Alessandra Mccormick was located at the following address for the appointment: [address] [place] [id number]     Emergency contacts provided:  General: Eloy Mccormick, Mom, [phone]  In-person: Rula Bates, [facility], [phone]     The risks and benefits of telehealth and what to do if there is a break in the connection were reviewed. Physical environment/space was confirmed to be secure and private on both ends. The session was both audio and visual on [employer], a secure system that is HIPAA compliant/certified. The telehealth consent form was signed prior to the session; see signed form in chart. The nature and limits of confidentiality, were discussed and Alessandra Mccormick stated understanding and consent.      Suicide Assessment:  Recent suicidal thoughts: No  Past suicidal thoughts: No  Any attempts in the past: No  Any family/friends/loved ones die by suicide: No  Plan or considering various methods: No  Access to guns: No  Protective factors: no h/o suicide attempt, no plan or intent, no h/o risky impulsive behavior and h/o seeking help when needed      Mental Status & Observations:  Alessandra appeared generally alert and oriented. Dress was appropriate to the weather and occasion. Grooming and hygiene were appropriate. Eye contact was good. Speech was of normal volume and normal. Mood was smiling with congruent affect. Thought processes were relevant, logical and goal-directed. Thought content was within normal limits with no evidence of psychotic or paranoid features. Memory appeared intact. Insight and judgment appeared age appropriate with good focus in session.  She exhibited normal motor activity during the appointment.  Behavior was cooperative, open and relaxed.       Observations and response to counseling:  Clt continues to present with good insight re: her transition out of sport even though she notes another surgery is in her future.      Intervention:  Clt processed thoughts/feelings re: the news she would need yet again another sx (meniscuc tear during season). Clt prvd update of her transition out of sport and how she has been managing. Clt shared more quality time with friends/family, increased time observing/job shadowing, future planned trips, and contentment with her upcoming recovery plan due to her successful one this past year. Clt noted challenges of just being still and not overbooking her schedule due to time away from sport.    Therapy objectives/goals:  Sport/life balance  Transition out of sport/college  Career/life planning  Injury rehab/recovery    Therapy follow-up plan:  Final two transition sessions during the spring semester to help support the clt's injury recovery.    Clt's next appointment was scheduled for Tuesday, 2/14 @ 10:30 AM.      Any Saldivar MA, LPC

## 2022-12-13 ENCOUNTER — APPOINTMENT (OUTPATIENT)
Dept: URBAN - METROPOLITAN AREA CLINIC 258 | Age: 22
Setting detail: DERMATOLOGY
End: 2022-12-13

## 2022-12-13 VITALS — WEIGHT: 150 LBS | HEIGHT: 67 IN

## 2022-12-13 DIAGNOSIS — Z79.899 OTHER LONG TERM (CURRENT) DRUG THERAPY: ICD-10-CM

## 2022-12-13 DIAGNOSIS — L70.0 ACNE VULGARIS: ICD-10-CM

## 2022-12-13 PROCEDURE — OTHER ORDER TESTS: OTHER

## 2022-12-13 PROCEDURE — OTHER VENIPUNCTURE: OTHER

## 2022-12-13 PROCEDURE — OTHER COUNSELING: OTHER

## 2022-12-13 PROCEDURE — OTHER HIGH RISK MEDICATION MONITORING: OTHER

## 2022-12-13 PROCEDURE — OTHER PRESCRIPTION: OTHER

## 2022-12-13 PROCEDURE — 81025 URINE PREGNANCY TEST: CPT

## 2022-12-13 PROCEDURE — 99214 OFFICE O/P EST MOD 30 MIN: CPT

## 2022-12-13 PROCEDURE — OTHER ISOTRETINOIN INITIATION: OTHER

## 2022-12-13 PROCEDURE — OTHER URINE PREGNANCY TEST: OTHER

## 2022-12-13 PROCEDURE — 36415 COLL VENOUS BLD VENIPUNCTURE: CPT

## 2022-12-13 PROCEDURE — OTHER MIPS QUALITY: OTHER

## 2022-12-13 PROCEDURE — OTHER ADDITIONAL NOTES: OTHER

## 2022-12-13 RX ORDER — ISOTRETINOIN 20 MG/1
20MG CAPSULE, LIQUID FILLED ORAL
Qty: 30 | Refills: 0 | Status: ERX | COMMUNITY
Start: 2022-12-13

## 2022-12-13 ASSESSMENT — LOCATION DETAILED DESCRIPTION DERM
LOCATION DETAILED: LEFT ANTECUBITAL SKIN
LOCATION DETAILED: LEFT CENTRAL MALAR CHEEK

## 2022-12-13 ASSESSMENT — LOCATION ZONE DERM
LOCATION ZONE: ARM
LOCATION ZONE: FACE

## 2022-12-13 ASSESSMENT — LOCATION SIMPLE DESCRIPTION DERM
LOCATION SIMPLE: LEFT UPPER ARM
LOCATION SIMPLE: LEFT CHEEK

## 2022-12-13 NOTE — PROCEDURE: VENIPUNCTURE
Number Of Tubes Drawn: 1
5
Bill 60615 For Specimen Handling/Conveyance To Laboratory?: no
Venipuncture Paragraph: An alcohol pad was applied to the venipuncture site. Venipuncture was performed using a butterfly needle. Pressure and a bandaid was applied to the site. No complications were noted.
Detail Level: None

## 2022-12-13 NOTE — PROCEDURE: HIGH RISK MEDICATION MONITORING
Xelmerlinz Pregnancy And Lactation Text: This medication is Pregnancy Category D and is not considered safe during pregnancy.  The risk during breast feeding is also uncertain. Xelmrelinz Pregnancy And Lactation Text: This medication is Pregnancy Category D and is not considered safe during pregnancy.  The risk during breast feeding is also uncertain.

## 2022-12-13 NOTE — PROCEDURE: ADDITIONAL NOTES
Additional Notes: - Will need 8-hour fasting baseline lab work at next office visit.\\n- Redraw hepatic panel prior to start as it was mildly abnormal on baseline labs\\n** Patient called after visit and informed me that she is planning to have knee surgery in 2 weeks and she will hold start until 3 weeks after surgery (approved by ortho surgeon)** She will not  prescription at this time.
Render Risk Assessment In Note?: yes
Detail Level: Simple

## 2022-12-13 NOTE — HPI: MEDICATION (ISOTRETINOIN)
How Severe Is It?: mild
Is This A New Presentation, Or A Follow-Up?: Follow Up Isotretinoin
Display Cumulative Dose In The Note?: Yes
Additional History: This is patient first month starting isotretinoin
Patient Reported Weight In Pounds (Required For Calculation): 150

## 2022-12-13 NOTE — PROCEDURE: HIGH RISK MEDICATION MONITORING
Discussed discharge medication name, dose, frequency, and side effects. Hydroxyzine Counseling: Patient advised that the medication is sedating and not to drive a car after taking this medication.  Patient informed of potential adverse effects including but not limited to dry mouth, urinary retention, and blurry vision.  The patient verbalized understanding of the proper use and possible adverse effects of hydroxyzine.  All of the patient's questions and concerns were addressed.

## 2022-12-13 NOTE — PROCEDURE: HIGH RISK MEDICATION MONITORING
Topical Ketoconazole Counseling: Patient counseled that this medication may cause skin irritation or allergic reactions.  In the event of skin irritation, the patient was advised to reduce the amount of the drug applied or use it less frequently.   The patient verbalized understanding of the proper use and possible adverse effects of ketoconazole.  All of the patient's questions and concerns were addressed.

## 2022-12-19 ENCOUNTER — OFFICE VISIT (OUTPATIENT)
Dept: FAMILY MEDICINE | Facility: CLINIC | Age: 22
End: 2022-12-19
Payer: COMMERCIAL

## 2022-12-19 VITALS
HEART RATE: 77 BPM | WEIGHT: 151 LBS | BODY MASS INDEX: 24.27 KG/M2 | SYSTOLIC BLOOD PRESSURE: 112 MMHG | HEIGHT: 66 IN | DIASTOLIC BLOOD PRESSURE: 74 MMHG

## 2022-12-19 DIAGNOSIS — Z01.818 PRE-OP EXAM: Primary | ICD-10-CM

## 2022-12-19 DIAGNOSIS — S83.241S TEAR OF MEDIAL MENISCUS OF RIGHT KNEE, CURRENT, UNSPECIFIED TEAR TYPE, SEQUELA: ICD-10-CM

## 2022-12-19 NOTE — PROGRESS NOTES
"NAME: Alessandra Mccormick      AGE: 22 year old      SEX: female  Chief Complaint/Reason for Procedure: right knee surgery pre op physical   Indications: right knee posterior meniscus tear  Surgeon: Gerson      Date of Surgery: 12/23/22  Location: [facility]    HPI: Alessandra returns for pre op physical for knee surgery she has planned for her right knee with Dr Cruz  She previously had arthroscopy on this knee, and now has a posterior medial meniscus tear that causes pain and dysfunction  She was able to play her season but had pain    PAST HISTORY  No past surgical history on file.    No past medical history on file.    MEDICATIONS:  Current Outpatient Medications   Medication Sig Dispense Refill     diclofenac (VOLTAREN) 50 MG EC tablet Take 1 tablet (50 mg) by mouth 2 times daily 28 tablet 0     levonorgest-eth estrad 91-Day (SEASONIQUE) 0.15-0.03 &0.01 MG per tablet Take 1 tablet by mouth daily         ALLERGIES  Patient has no known allergies.    SOCIAL HISTORY  Social History     Tobacco Use     Smoking status: Never     Smokeless tobacco: Never   Substance Use Topics     Alcohol use: No     Drug use: No       FAMILY HISTORY  Family History   Problem Relation Age of Onset     Skin Cancer Mother      Breast Cancer Mother      Breast Cancer Cousin        Family History of Anesthesia Reaction: No  Family History of Bleeding Disorder:  No    REVIEW OF SYSTEMS  Constitutional, HEENT, cardiovascular, pulmonary, gi and gu systems are negative, except as otherwise noted.    PHYSICAL EXAM  /74   Pulse 77   Ht 1.676 m (5' 6\")   Wt 68.5 kg (151 lb)   BMI 24.37 kg/m    General Appearance: Normal  Skin:  Normal  Head:  Normal  Eyes: Normal  Ears: Normal  Nose: Normal  Mouth/Teeth: Normal  Throat: Normal  Neck: Normal  Chest/Lungs: Normal  Heart/Vascular: Normal  Abdomen: Normal    Skeletal: Normal, except for right knee has pain with flexion and along medial joint line  Lymphoid: Normal  Blood Vessels: " Normal  Neuromuscular: Normal  Other: Normal    Assessment  21 yo female with pre op physical for right knee arthroscopy    PLAN  This patient has been examined by me this day and has been found to be an acceptable candidate for surgery with apppropriate anesthesia.  Kirt Barnes MD   12/19/2022

## 2022-12-19 NOTE — LETTER
"  12/19/2022      RE: Alessandra Mccormick  [address] [facility] [address]     Dear Colleague,    Thank you for referring your patient, Alessandra Mccormick, to the [facility] STUDENT [facility]THLETIC CLINIC. Please see a copy of my visit note below.    NAME: Alessandra Mccormick      AGE: 22 year old      SEX: female  Chief Complaint/Reason for Procedure: right knee surgery pre op physical   Indications: right knee posterior meniscus tear  Surgeon: Gerson      Date of Surgery: 12/23/22  Location: [facility]    HPI: Alessandra returns for pre op physical for knee surgery she has planned for her right knee with Dr Cruz  She previously had arthroscopy on this knee, and now has a posterior medial meniscus tear that causes pain and dysfunction  She was able to play her season but had pain    PAST HISTORY  No past surgical history on file.    No past medical history on file.    MEDICATIONS:  Current Outpatient Medications   Medication Sig Dispense Refill     diclofenac (VOLTAREN) 50 MG EC tablet Take 1 tablet (50 mg) by mouth 2 times daily 28 tablet 0     levonorgest-eth estrad 91-Day (SEASONIQUE) 0.15-0.03 &0.01 MG per tablet Take 1 tablet by mouth daily         ALLERGIES  Patient has no known allergies.    SOCIAL HISTORY  Social History     Tobacco Use     Smoking status: Never     Smokeless tobacco: Never   Substance Use Topics     Alcohol use: No     Drug use: No       FAMILY HISTORY  Family History   Problem Relation Age of Onset     Skin Cancer Mother      Breast Cancer Mother      Breast Cancer Cousin        Family History of Anesthesia Reaction: No  Family History of Bleeding Disorder:  No    REVIEW OF SYSTEMS  Constitutional, HEENT, cardiovascular, pulmonary, gi and gu systems are negative, except as otherwise noted.    PHYSICAL EXAM  /74   Pulse 77   Ht 1.676 m (5' 6\")   Wt 68.5 kg (151 lb)   BMI 24.37 kg/m    General Appearance: Normal  Skin:  Normal  Head:  Normal  Eyes: Normal  Ears: Normal  Nose: " Normal  Mouth/Teeth: Normal  Throat: Normal  Neck: Normal  Chest/Lungs: Normal  Heart/Vascular: Normal  Abdomen: Normal    Skeletal: Normal, except for right knee has pain with flexion and along medial joint line  Lymphoid: Normal  Blood Vessels: Normal  Neuromuscular: Normal  Other: Normal    Assessment  21 yo female with pre op physical for right knee arthroscopy    PLAN  This patient has been examined by me this day and has been found to be an acceptable candidate for surgery with apppropriate anesthesia.  Kirt Barnes MD   12/19/2022            Again, thank you for allowing me to participate in the care of your patient.      Sincerely,    Kirt Barnes MD

## 2022-12-19 NOTE — LETTER
Date:December 20, 2022      Patient was self referred, no letter generated. Do not send.        [facility] Health Information

## 2022-12-26 ENCOUNTER — DOCUMENTATION ONLY (OUTPATIENT)
Dept: FAMILY MEDICINE | Facility: CLINIC | Age: 22
End: 2022-12-26

## 2022-12-26 NOTE — PROGRESS NOTES
"[facility] ATHLETICS     Physical Therapy initial assessment note  Treating therapist: Jim Thompson DPT  Date of service performed: December 26, 2022  Sport: Soccer      Concern: Post-op - Right knee menisectomy    Body part: Knee  Describe:  Surgical  Type: Athletics related  Date of surgery:  11/23/22     S: Alessandra presents to PT 3 days post-op for R knee menisectomyr, she reports having R ACL and meniscal repair in Fall of 2021 and the menisectomy performed was on the other meniscus in the R knee. She reports utilizing crutches this weekend but as of today she no longer uses them and has no concerns with progress this far.  She also adds that she had a home program provided by her  including heel slides, quad sets, and SLR.      O:      ROM:  R:  0-145,  L:  4-0-145  SLR 2x12 no lag  SL stance x30\" EO     Good quad setting/activation on R     Increased swelling of R knee     Ambulation without crutches    Bandage removed and incisions inspected - good healing demonstrated with no concerns.                 A: Alessandra demonstrates good initial progress following surgery, with good quad setting and strength.  She was educated to initiate knee extension stretch to achieve ROM of extension similar to that of her unaffected side.  She will benefit from skilled PT to address global deficits in hip and knee strength.  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           P: Plan is for Jose to see PT (Jim, Blane, or Benji) and/or  1-2x/week and follow post-operative guidelines to return her to her goals stated above.  She was educated on no running/pounding for 6 weeks.        Rehab exercises performed:  Quad setting  Heel slides   Knee extension with heel propped  4-way SLR:   Flexion 2x8, abduction x10, adduction x10, extension x10   LAQ x15, #3 2x15  SL stance 3x30\"  DL calf " raises 3x20  Mini squat 2x8          Jim Thompson, PT

## 2022-12-30 ENCOUNTER — DOCUMENTATION ONLY (OUTPATIENT)
Dept: FAMILY MEDICINE | Facility: CLINIC | Age: 22
End: 2022-12-30

## 2023-01-01 NOTE — PROGRESS NOTES
[facility] ATHLETICS     Physical Therapy Follow-Up note  Treating therapist: Blane Obrien DPT  Date of service performed: December 30, 2022  Sport: Soccer      Concern: Post-op - Right knee menisectomy; 12/23/22    S: Alessandra presents to PT one week post-op procedure above. Has been working on her exercises given to her by Jim and Rula. Does not report any issues. Denies fever, night sweats, chills.      O:      ROM:  R:  2-0-145,  L:  4-0-145  SLR No Lag      Good quad setting/activation on R     Swelling of main concern today. Significant effusion. With active quadriceps activation, significant bulge of medial portal about the size of a 3D quarter. Only tasks for this weekend are ice, elevate and compress to reduce present effusion.      Incisions: uncovered and stitches intact. No signs of infection or drainage at this time.     A: Alessandra demonstrates satisfactory condition. Main concern at this point is amount of effusion present. Plan to spend extra time this weekend with elevation to help reduce effusion. Would be wise to hold off on WB tasks as effusion management is number one priority.     P: Plan is for Jose to see PT 1-2/wk. I will follow up with Alessandra twice next week to monitor progress. Rula will return Thursday.         Rehab exercises performed:  SLR   Ambulation assessment   Swelling elevation + edema massage   Patellar mobilizations     Blane Obrien, PT

## 2023-01-03 ENCOUNTER — DOCUMENTATION ONLY (OUTPATIENT)
Dept: FAMILY MEDICINE | Facility: CLINIC | Age: 23
End: 2023-01-03

## 2023-01-03 NOTE — PROGRESS NOTES
[facility] ATHLETICS     Physical Therapy Follow-Up note  Treating therapist: Blane Obrien DPT  Date of service performed: Harvey 3, 2023  Sport: Soccer      Concern: Post-op - Right knee menisectomy; 12/23/22    S: A lot of compression, elevation and ice over the weekend. No knee pain. No mechanical or giving out symptoms. Feeling good.      O:      ROM:  full; subjective feeling of tightness due to knee effusion      Good quad setting/activation on R; SLR with heel pop with 3# weight 3 x 15 reps without issue     Swelling concerns improving. Less ballooning' effect at medial portal incision.       Incisions: uncovered, no drainage, no signs of infection, signs of great healing. Stitches were removed today with sterile removal kit. Portal sites covered by band aids for next 2 days.     A: Good progress over the weekend with effusion. Significant effusion still present but improvements since Friday.     P: Follow up with ROSALVA Horta and continue with rehab program. Do believe she is ready to shallow DL tasks and minimal resistance biking to help repetitive motion of knee in hops of clearing effusion. Monitor loading and swelling response.         Rehab exercises performed:  Bike (minimal resistance) x 5 min   SLR 3 x 10 3# - heel pop emphasis   DL BW squats to 70 deg 3 x 10   Fire hydrant (green) 3 x 12   Knee Extension Isos (90 and 60 deg)- Russian E-stim used 8 min   DL Glut Bridges 3 x 10     Blane Obrien PT

## 2023-01-10 ENCOUNTER — LAB REQUISITION (OUTPATIENT)
Dept: LAB | Facility: CLINIC | Age: 23
End: 2023-01-10

## 2023-01-10 DIAGNOSIS — Z11.8 ENCOUNTER FOR SCREENING FOR OTHER INFECTIOUS AND PARASITIC DISEASES: ICD-10-CM

## 2023-01-10 PROCEDURE — 87491 CHLMYD TRACH DNA AMP PROBE: CPT | Performed by: NURSE PRACTITIONER

## 2023-01-11 LAB
C TRACH DNA SPEC QL PROBE+SIG AMP: NEGATIVE
N GONORRHOEA DNA SPEC QL NAA+PROBE: NEGATIVE

## 2023-01-12 NOTE — PROGRESS NOTES
[facility] ATHLETICS  [facility] ATC follow-up note  Date of service performed: 12/23/22    Concern/injury: R knee meniscus    Assessment/plan: JERMAINEloise had arthroscopic procedure by Dr. Nieto today. He ended up cleaning up cartilage breakdown in the trochlea. Upon exam, meniscus was healthy and stable. Alessandra had a cyclops lesion that was removed. Dr. Nieto did collect material that could be used for LALO biopsy later if recovery and rehab does not go as planned. We will do rehab like a normal arthroscopic procedure working on swelling control, ROM, strength and proprioception. I will be out of town for 2 weeks and Alessandra is set up to work with Blane Obrien PT in the meantime.     Sylvia Bates, ATC

## 2023-01-12 NOTE — PROGRESS NOTES
[facility] ATHLETICS  [facility] AT[facility] follow-up note  Date of service performed: 1/12/23    Concern/injury: R knee scope    Assessment/plan: Alessandra has been doing great with rehab and strengthening. She is making progress as expected. We will follow up with Dr. Nieto 1/18 for first postop check.     Sylvia Bates ATC

## 2023-01-12 NOTE — PROGRESS NOTES
[facility] ATHLETICS  [facility] ATC follow-up note  Date of service performed: 1/5/23    Concern/injury: R knee scope    Assessment/plan: Alessandra has been recovering well and has been working primarily with Blane Obrien for first 2 weeks of PT. She was able to walk without crutches at approx 2 days postop and has not had pain or gait issues. Comes in today with normal gait. Quad atrophy visible but not affecting walking. Blane was able to remove her sutures from 2 scope portals around day 10 (Dec 30) with no complications. Alessandra is comfortable performing SL exercises. Has no pain, just feels weak. Near FROM- lacking approx 3-5 degrees of knee flexion. Ext looks excellent. We will continue working on strength and proprioception.     Sylvia Bates ATC

## 2023-01-13 ENCOUNTER — APPOINTMENT (OUTPATIENT)
Dept: URBAN - METROPOLITAN AREA CLINIC 258 | Age: 23
Setting detail: DERMATOLOGY
End: 2023-01-13

## 2023-01-13 VITALS — WEIGHT: 150 LBS | HEIGHT: 67 IN

## 2023-01-13 VITALS — HEIGHT: 67 IN | WEIGHT: 150 LBS

## 2023-01-13 DIAGNOSIS — L70.0 ACNE VULGARIS: ICD-10-CM

## 2023-01-13 DIAGNOSIS — Z79.899 OTHER LONG TERM (CURRENT) DRUG THERAPY: ICD-10-CM

## 2023-01-13 PROCEDURE — OTHER PRESCRIPTION MEDICATION MANAGEMENT: OTHER

## 2023-01-13 PROCEDURE — 99214 OFFICE O/P EST MOD 30 MIN: CPT

## 2023-01-13 PROCEDURE — OTHER ISOTRETINOIN INITIATION: OTHER

## 2023-01-13 PROCEDURE — OTHER PRESCRIPTION: OTHER

## 2023-01-13 PROCEDURE — OTHER COUNSELING: OTHER

## 2023-01-13 PROCEDURE — OTHER URINE PREGNANCY TEST: OTHER

## 2023-01-13 PROCEDURE — OTHER HIGH RISK MEDICATION MONITORING: OTHER

## 2023-01-13 PROCEDURE — 81025 URINE PREGNANCY TEST: CPT

## 2023-01-13 RX ORDER — ISOTRETINOIN 30 MG/1
30MG CAPSULE, LIQUID FILLED ORAL
Qty: 30 | Refills: 0 | Status: ERX | COMMUNITY
Start: 2023-01-13

## 2023-01-13 ASSESSMENT — LOCATION SIMPLE DESCRIPTION DERM
LOCATION SIMPLE: RIGHT CHEEK
LOCATION SIMPLE: LEFT CHEEK

## 2023-01-13 ASSESSMENT — LOCATION ZONE DERM: LOCATION ZONE: FACE

## 2023-01-13 ASSESSMENT — LOCATION DETAILED DESCRIPTION DERM
LOCATION DETAILED: LEFT SUPERIOR CENTRAL MALAR CHEEK
LOCATION DETAILED: LEFT CENTRAL MALAR CHEEK
LOCATION DETAILED: RIGHT INFERIOR LATERAL MALAR CHEEK

## 2023-01-13 NOTE — PROCEDURE: PRESCRIPTION MEDICATION MANAGEMENT
Detail Level: Zone
Initiate Treatment: Isotretinoin 30mg- one capsule by mouth  once a day for 30 days.
Render In Strict Bullet Format?: No

## 2023-01-13 NOTE — PROCEDURE: HIGH RISK MEDICATION MONITORING
Addended by: MARZENA LINCOLN on: 8/6/2018 09:59 AM     Modules accepted: Orders     Picato Counseling:  I discussed with the patient the risks of Picato including but not limited to erythema, scaling, itching, weeping, crusting, and pain.

## 2023-01-16 RX ORDER — ISOTRETINOIN 30 MG/1
30MG CAPSULE, LIQUID FILLED ORAL
Qty: 30 | Refills: 0 | Status: ERX

## 2023-01-18 ENCOUNTER — OFFICE VISIT (OUTPATIENT)
Dept: ORTHOPEDICS | Facility: CLINIC | Age: 23
End: 2023-01-18
Payer: COMMERCIAL

## 2023-01-18 VITALS — WEIGHT: 152 LBS | BODY MASS INDEX: 23.86 KG/M2 | HEIGHT: 67 IN

## 2023-01-18 DIAGNOSIS — Z98.890 S/P KNEE SURGERY: Primary | ICD-10-CM

## 2023-01-18 NOTE — LETTER
Date:January 23, 2023      Patient was self referred, no letter generated. Do not send.        [facility] Health Information

## 2023-01-18 NOTE — LETTER
1/18/2023      RE: Alessandra Mccormick  [address] [facility] [address]     Dear Colleague,    Thank you for referring your patient, Alessandra Mccormick, to the [facility] STUDENT ATHLETIC CLINIC. Please see a copy of my visit note below.    Chief Complaint:  Postoperative visit, right knee    Date of Surgery:  12/23/22    History of Present Illness:  Alessandra is a 22-year-old former  who is now 3 weeks status post right knee arthroscopy chondroplasty and debridement.  This was done at [facility].  She is doing well.  She has minimal discomfort.  Swelling is decreasing.    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    Physical Examination:  22-year-old female alert oriented no apparent distress.  Right knee reveals well-healed arthroscopic portals.  There is a 1+ effusion.  Her range of motion is symmetrical.  She has a negative Lachman.  Excellent straight leg raise.    Impression:  3 weeks status post right knee arthroscopy and debridement.  She is doing well.  Her motion is good.    Plan:  1. Continue to work on strengthening and low impact conditioning  2. Follow-up with me in 12 weeks for routine recheck.          Again, thank you for allowing me to participate in the care of your patient.      Sincerely,    Jean Paul Nieto MD

## 2023-01-22 ENCOUNTER — HEALTH MAINTENANCE LETTER (OUTPATIENT)
Age: 23
End: 2023-01-22

## 2023-01-22 NOTE — PROGRESS NOTES
Chief Complaint:  Postoperative visit, right knee    Date of Surgery:  12/23/22    History of Present Illness:  Alessandra is a 22-year-old former  who is now 3 weeks status post right knee arthroscopy chondroplasty and debridement.  This was done at [facility] [place].  She is doing well.  She has minimal discomfort.  Swelling is decreasing.    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    Physical Examination:  22-year-old female alert oriented no apparent distress.  Right knee reveals well-healed arthroscopic portals.  There is a 1+ effusion.  Her range of motion is symmetrical.  She has a negative Lachman.  Excellent straight leg raise.    Impression:  3 weeks status post right knee arthroscopy and debridement.  She is doing well.  Her motion is good.    Plan:  1. Continue to work on strengthening and low impact conditioning  2. Follow-up with me in 12 weeks for routine recheck.

## 2023-02-14 ENCOUNTER — APPOINTMENT (OUTPATIENT)
Dept: URBAN - METROPOLITAN AREA CLINIC 258 | Age: 23
Setting detail: DERMATOLOGY
End: 2023-02-14

## 2023-02-14 DIAGNOSIS — Z79.899 OTHER LONG TERM (CURRENT) DRUG THERAPY: ICD-10-CM

## 2023-02-14 DIAGNOSIS — L70.0 ACNE VULGARIS: ICD-10-CM

## 2023-02-14 PROCEDURE — 81025 URINE PREGNANCY TEST: CPT

## 2023-02-14 PROCEDURE — 99214 OFFICE O/P EST MOD 30 MIN: CPT

## 2023-02-14 PROCEDURE — OTHER COUNSELING: OTHER

## 2023-02-14 PROCEDURE — 36415 COLL VENOUS BLD VENIPUNCTURE: CPT

## 2023-02-14 PROCEDURE — OTHER PRESCRIPTION: OTHER

## 2023-02-14 PROCEDURE — OTHER ORDER TESTS: OTHER

## 2023-02-14 PROCEDURE — OTHER URINE PREGNANCY TEST: OTHER

## 2023-02-14 PROCEDURE — OTHER VENIPUNCTURE: OTHER

## 2023-02-14 PROCEDURE — OTHER PRESCRIPTION MEDICATION MANAGEMENT: OTHER

## 2023-02-14 PROCEDURE — OTHER HIGH RISK MEDICATION MONITORING: OTHER

## 2023-02-14 PROCEDURE — OTHER ISOTRETINOIN MONITORING: OTHER

## 2023-02-14 RX ORDER — ISOTRETINOIN 30 MG/1
CAPSULE, LIQUID FILLED ORAL
Qty: 60 | Refills: 0 | Status: ERX

## 2023-02-14 NOTE — HPI: MEDICATION (ISOTRETINOIN)
Is This A New Presentation, Or A Follow-Up?: Follow Up Isotretinoin
Additional History: Patient reports that her mood is a little more ‘down.’ She reports that other life factors could be influencing her mood as well (like not being in school and playing soccer).

## 2023-02-14 NOTE — PROCEDURE: HIGH RISK MEDICATION MONITORING
5-Fu Counseling: 5-Fluorouracil Counseling:  I discussed with the patient the risks of 5-fluorouracil including but not limited to erythema, scaling, itching, weeping, crusting, and pain.
Zyclara Pregnancy And Lactation Text: This medication is Pregnancy Category C. It is unknown if this medication is excreted in breast milk.
Rituxan Pregnancy And Lactation Text: This medication is Pregnancy Category C and it isn't know if it is safe during pregnancy. It is unknown if this medication is excreted in breast milk but similar antibodies are known to be excreted.
Prednisone Pregnancy And Lactation Text: This medication is Pregnancy Category C and it isn't know if it is safe during pregnancy. This medication is excreted in breast milk.
Fluconazole Pregnancy And Lactation Text: This medication is Pregnancy Category C and it isn't know if it is safe during pregnancy. It is also excreted in breast milk.
Hydroquinone Pregnancy And Lactation Text: This medication has not been assigned a Pregnancy Risk Category but animal studies failed to show danger with the topical medication. It is unknown if the medication is excreted in breast milk.
Cosentyx Pregnancy And Lactation Text: This medication is Pregnancy Category B and is considered safe during pregnancy. It is unknown if this medication is excreted in breast milk.
Zyclara Counseling:  I discussed with the patient the risks of imiquimod including but not limited to erythema, scaling, itching, weeping, crusting, and pain.  Patient understands that the inflammatory response to imiquimod is variable from person to person and was educated regarded proper titration schedule.  If flu-like symptoms develop, patient knows to discontinue the medication and contact us.
Xolair Pregnancy And Lactation Text: This medication is Pregnancy Category B and is considered safe during pregnancy. This medication is excreted in breast milk.
Cellcept Pregnancy And Lactation Text: This medication is Pregnancy Category D and isn't considered safe during pregnancy. It is unknown if this medication is excreted in breast milk.
Doxycycline Counseling:  Patient counseled regarding possible photosensitivity and increased risk for sunburn.  Patient instructed to avoid sunlight, if possible.  When exposed to sunlight, patients should wear protective clothing, sunglasses, and sunscreen.  The patient was instructed to call the office immediately if the following severe adverse effects occur:  hearing changes, easy bruising/bleeding, severe headache, or vision changes.  The patient verbalized understanding of the proper use and possible adverse effects of doxycycline.  All of the patient's questions and concerns were addressed.
Cosentyx Counseling:  I discussed with the patient the risks of Cosentyx including but not limited to worsening of Crohn's disease, immunosuppression, allergic reactions and infections.  The patient understands that monitoring is required including a PPD at baseline and must alert us or the primary physician if symptoms of infection or other concerning signs are noted.
Glycopyrrolate Pregnancy And Lactation Text: This medication is Pregnancy Category B and is considered safe during pregnancy. It is unknown if it is excreted breast milk.
Siliq Pregnancy And Lactation Text: The risk during pregnancy and breastfeeding is uncertain with this medication.
Isotretinoin Counseling: Patient should get monthly blood tests, not donate blood, not drive at night if vision affected, not share medication, and not undergo elective surgery for 6 months after tx completed. Side effects reviewed, pt to contact office should one occur.
Solaraze Pregnancy And Lactation Text: This medication is Pregnancy Category B and is considered safe. There is some data to suggest avoiding during the third trimester. It is unknown if this medication is excreted in breast milk.
Simponi Counseling:  I discussed with the patient the risks of golimumab including but not limited to myelosuppression, immunosuppression, autoimmune hepatitis, demyelinating diseases, lymphoma, and serious infections.  The patient understands that monitoring is required including a PPD at baseline and must alert us or the primary physician if symptoms of infection or other concerning signs are noted.
Fluconazole Counseling:  Patient counseled regarding adverse effects of fluconazole including but not limited to headache, diarrhea, nausea, upset stomach, liver function test abnormalities, taste disturbance, and stomach pain.  There is a rare possibility of liver failure that can occur when taking fluconazole.  The patient understands that monitoring of LFTs and kidney function test may be required, especially at baseline. The patient verbalized understanding of the proper use and possible adverse effects of fluconazole.  All of the patient's questions and concerns were addressed.
Cimzia Counseling:  I discussed with the patient the risks of Cimzia including but not limited to immunosuppression, allergic reactions and infections.  The patient understands that monitoring is required including a PPD at baseline and must alert us or the primary physician if symptoms of infection or other concerning signs are noted.
Oxybutynin Counseling:  I discussed with the patient the risks of oxybutynin including but not limited to skin rash, drowsiness, dry mouth, difficulty urinating, and blurred vision.
Azithromycin Pregnancy And Lactation Text: This medication is considered safe during pregnancy and is also secreted in breast milk.
Tetracycline Counseling: Patient counseled regarding possible photosensitivity and increased risk for sunburn.  Patient instructed to avoid sunlight, if possible.  When exposed to sunlight, patients should wear protective clothing, sunglasses, and sunscreen.  The patient was instructed to call the office immediately if the following severe adverse effects occur:  hearing changes, easy bruising/bleeding, severe headache, or vision changes.  The patient verbalized understanding of the proper use and possible adverse effects of tetracycline.  All of the patient's questions and concerns were addressed. Patient understands to avoid pregnancy while on therapy due to potential birth defects.
Quinolones Counseling:  I discussed with the patient the risks of fluoroquinolones including but not limited to GI upset, allergic reaction, drug rash, diarrhea, dizziness, photosensitivity, yeast infections, liver function test abnormalities, tendonitis/tendon rupture.
Clindamycin Counseling: I counseled the patient regarding use of clindamycin as an antibiotic for prophylactic and/or therapeutic purposes. Clindamycin is active against numerous classes of bacteria, including skin bacteria. Side effects may include nausea, diarrhea, gastrointestinal upset, rash, hives, yeast infections, and in rare cases, colitis.
Odomzo Pregnancy And Lactation Text: This medication is Pregnancy Category X and is absolutely contraindicated during pregnancy. It is unknown if it is excreted in breast milk.
Albendazole Pregnancy And Lactation Text: This medication is Pregnancy Category C and it isn't known if it is safe during pregnancy. It is also excreted in breast milk.
Elidel Counseling: Patient may experience a mild burning sensation during topical application. Elidel is not approved in children less than 2 years of age. There have been case reports of hematologic and skin malignancies in patients using topical calcineurin inhibitors although causality is questionable.
Carac Counseling:  I discussed with the patient the risks of Carac including but not limited to erythema, scaling, itching, weeping, crusting, and pain.
Cimetidine Pregnancy And Lactation Text: This medication is Pregnancy Category B and is considered safe during pregnancy. It is also excreted in breast milk and breast feeding isn't recommended.
Terbinafine Counseling: Patient counseling regarding adverse effects of terbinafine including but not limited to headache, diarrhea, rash, upset stomach, liver function test abnormalities, itching, taste/smell disturbance, nausea, abdominal pain, and flatulence.  There is a rare possibility of liver failure that can occur when taking terbinafine.  The patient understands that a baseline LFT and kidney function test may be required. The patient verbalized understanding of the proper use and possible adverse effects of terbinafine.  All of the patient's questions and concerns were addressed.
Detail Level: Detailed
Odomzo Counseling- I discussed with the patient the risks of Odomzo including but not limited to nausea, vomiting, diarrhea, constipation, weight loss, changes in the sense of taste, decreased appetite, muscle spasms, and hair loss.  The patient verbalized understanding of the proper use and possible adverse effects of Odomzo.  All of the patient's questions and concerns were addressed.
Griseofulvin Counseling:  I discussed with the patient the risks of griseofulvin including but not limited to photosensitivity, cytopenia, liver damage, nausea/vomiting and severe allergy.  The patient understands that this medication is best absorbed when taken with a fatty meal (e.g., ice cream or french fries).
Valtrex Pregnancy And Lactation Text: this medication is Pregnancy Category B and is considered safe during pregnancy. This medication is not directly found in breast milk but it's metabolite acyclovir is present.
Clofazimine Counseling:  I discussed with the patient the risks of clofazimine including but not limited to skin and eye pigmentation, liver damage, nausea/vomiting, gastrointestinal bleeding and allergy.
Nsaids Counseling: NSAID Counseling: I discussed with the patient that NSAIDs should be taken with food. Prolonged use of NSAIDs can result in the development of stomach ulcers.  Patient advised to stop taking NSAIDs if abdominal pain occurs.  The patient verbalized understanding of the proper use and possible adverse effects of NSAIDs.  All of the patient's questions and concerns were addressed.
Picato Counseling:  I discussed with the patient the risks of Picato including but not limited to erythema, scaling, itching, weeping, crusting, and pain.
Dapsone Pregnancy And Lactation Text: This medication is Pregnancy Category C and is not considered safe during pregnancy or breast feeding.
Methotrexate Pregnancy And Lactation Text: This medication is Pregnancy Category X and is known to cause fetal harm. This medication is excreted in breast milk.
Tetracycline Pregnancy And Lactation Text: This medication is Pregnancy Category D and not consider safe during pregnancy. It is also excreted in breast milk.
Protopic Pregnancy And Lactation Text: This medication is Pregnancy Category C. It is unknown if this medication is excreted in breast milk when applied topically.
Clofazimine Pregnancy And Lactation Text: This medication is Pregnancy Category C and isn't considered safe during pregnancy. It is excreted in breast milk.
Tazorac Pregnancy And Lactation Text: This medication is not safe during pregnancy. It is unknown if this medication is excreted in breast milk.
Oxybutynin Pregnancy And Lactation Text: This medication is Pregnancy Category B and is considered safe during pregnancy. It is unknown if it is excreted in breast milk.
Azathioprine Counseling:  I discussed with the patient the risks of azathioprine including but not limited to myelosuppression, immunosuppression, hepatotoxicity, lymphoma, and infections.  The patient understands that monitoring is required including baseline LFTs, Creatinine, possible TPMP genotyping and weekly CBCs for the first month and then every 2 weeks thereafter.  The patient verbalized understanding of the proper use and possible adverse effects of azathioprine.  All of the patient's questions and concerns were addressed.
Doxepin Pregnancy And Lactation Text: This medication is Pregnancy Category C and it isn't known if it is safe during pregnancy. It is also excreted in breast milk and breast feeding isn't recommended.
Griseofulvin Pregnancy And Lactation Text: This medication is Pregnancy Category X and is known to cause serious birth defects. It is unknown if this medication is excreted in breast milk but breast feeding should be avoided.
Hydroxychloroquine Counseling:  I discussed with the patient that a baseline ophthalmologic exam is needed at the start of therapy and every year thereafter while on therapy. A CBC may also be warranted for monitoring.  The side effects of this medication were discussed with the patient, including but not limited to agranulocytosis, aplastic anemia, seizures, rashes, retinopathy, and liver toxicity. Patient instructed to call the office should any adverse effect occur.  The patient verbalized understanding of the proper use and possible adverse effects of Plaquenil.  All the patient's questions and concerns were addressed.
Spironolactone Pregnancy And Lactation Text: This medication can cause feminization of the male fetus and should be avoided during pregnancy. The active metabolite is also found in breast milk.
Acitretin Counseling:  I discussed with the patient the risks of acitretin including but not limited to hair loss, dry lips/skin/eyes, liver damage, hyperlipidemia, depression/suicidal ideation, photosensitivity.  Serious rare side effects can include but are not limited to pancreatitis, pseudotumor cerebri, bony changes, clot formation/stroke/heart attack.  Patient understands that alcohol is contraindicated since it can result in liver toxicity and significantly prolong the elimination of the drug by many years.
Bactrim Counseling:  I discussed with the patient the risks of sulfa antibiotics including but not limited to GI upset, allergic reaction, drug rash, diarrhea, dizziness, photosensitivity, and yeast infections.  Rarely, more serious reactions can occur including but not limited to aplastic anemia, agranulocytosis, methemoglobinemia, blood dyscrasias, liver or kidney failure, lung infiltrates or desquamative/blistering drug rashes.
Protopic Counseling: Patient may experience a mild burning sensation during topical application. Protopic is not approved in children less than 2 years of age. There have been case reports of hematologic and skin malignancies in patients using topical calcineurin inhibitors although causality is questionable.
Enbrel Counseling:  I discussed with the patient the risks of etanercept including but not limited to myelosuppression, immunosuppression, autoimmune hepatitis, demyelinating diseases, lymphoma, and infections.  The patient understands that monitoring is required including a PPD at baseline and must alert us or the primary physician if symptoms of infection or other concerning signs are noted.
Xelmerlinz Pregnancy And Lactation Text: This medication is Pregnancy Category D and is not considered safe during pregnancy.  The risk during breast feeding is also uncertain.
Cephalexin Counseling: I counseled the patient regarding use of cephalexin as an antibiotic for prophylactic and/or therapeutic purposes. Cephalexin (commonly prescribed under brand name Keflex) is a cephalosporin antibiotic which is active against numerous classes of bacteria, including most skin bacteria. Side effects may include nausea, diarrhea, gastrointestinal upset, rash, hives, yeast infections, and in rare cases, hepatitis, kidney disease, seizures, fever, confusion, neurologic symptoms, and others. Patients with severe allergies to penicillin medications are cautioned that there is about a 10% incidence of cross-reactivity with cephalosporins. When possible, patients with penicillin allergies should use alternatives to cephalosporins for antibiotic therapy.
Stelara Counseling:  I discussed with the patient the risks of ustekinumab including but not limited to immunosuppression, malignancy, posterior leukoencephalopathy syndrome, and serious infections.  The patient understands that monitoring is required including a PPD at baseline and must alert us or the primary physician if symptoms of infection or other concerning signs are noted.
Albendazole Counseling:  I discussed with the patient the risks of albendazole including but not limited to cytopenia, kidney damage, nausea/vomiting and severe allergy.  The patient understands that this medication is being used in an off-label manner.
Benzoyl Peroxide Pregnancy And Lactation Text: This medication is Pregnancy Category C. It is unknown if benzoyl peroxide is excreted in breast milk.
Erivedge Counseling- I discussed with the patient the risks of Erivedge including but not limited to nausea, vomiting, diarrhea, constipation, weight loss, changes in the sense of taste, decreased appetite, muscle spasms, and hair loss.  The patient verbalized understanding of the proper use and possible adverse effects of Erivedge.  All of the patient's questions and concerns were addressed.
Xeljanz Counseling: I discussed with the patient the risks of Xeljanz therapy including increased risk of infection, liver issues, headache, diarrhea, or cold symptoms. Live vaccines should be avoided. They were instructed to call if they have any problems.
Benzoyl Peroxide Counseling: Patient counseled that medicine may cause skin irritation and bleach clothing.  In the event of skin irritation, the patient was advised to reduce the amount of the drug applied or use it less frequently.   The patient verbalized understanding of the proper use and possible adverse effects of benzoyl peroxide.  All of the patient's questions and concerns were addressed.
Itraconazole Counseling:  I discussed with the patient the risks of itraconazole including but not limited to liver damage, nausea/vomiting, neuropathy, and severe allergy.  The patient understands that this medication is best absorbed when taken with acidic beverages such as non-diet cola or ginger ale.  The patient understands that monitoring is required including baseline LFTs and repeat LFTs at intervals.  The patient understands that they are to contact us or the primary physician if concerning signs are noted.
Gabapentin Counseling: I discussed with the patient the risks of gabapentin including but not limited to dizziness, somnolence, fatigue and ataxia.
Thalidomide Counseling: I discussed with the patient the risks of thalidomide including but not limited to birth defects, anxiety, weakness, chest pain, dizziness, cough and severe allergy.
Cimzia Pregnancy And Lactation Text: This medication crosses the placenta but can be considered safe in certain situations. Cimzia may be excreted in breast milk.
Tazorac Counseling:  Patient advised that medication is irritating and drying.  Patient may need to apply sparingly and wash off after an hour before eventually leaving it on overnight.  The patient verbalized understanding of the proper use and possible adverse effects of tazorac.  All of the patient's questions and concerns were addressed.
Dupixent Pregnancy And Lactation Text: This medication likely crosses the placenta but the risk for the fetus is uncertain. This medication is excreted in breast milk.
Solaraze Counseling:  I discussed with the patient the risks of Solaraze including but not limited to erythema, scaling, itching, weeping, crusting, and pain.
Ivermectin Counseling:  Patient instructed to take medication on an empty stomach with a full glass of water.  Patient informed of potential adverse effects including but not limited to nausea, diarrhea, dizziness, itching, and swelling of the extremities or lymph nodes.  The patient verbalized understanding of the proper use and possible adverse effects of ivermectin.  All of the patient's questions and concerns were addressed.
Birth Control Pills Counseling: Birth Control Pill Counseling: I discussed with the patient the potential side effects of OCPs including but not limited to increased risk of stroke, heart attack, thrombophlebitis, deep venous thrombosis, hepatic adenomas, breast changes, GI upset, headaches, and depression.  The patient verbalized understanding of the proper use and possible adverse effects of OCPs. All of the patient's questions and concerns were addressed.
Topical Clindamycin Counseling: Patient counseled that this medication may cause skin irritation or allergic reactions.  In the event of skin irritation, the patient was advised to reduce the amount of the drug applied or use it less frequently.   The patient verbalized understanding of the proper use and possible adverse effects of clindamycin.  All of the patient's questions and concerns were addressed.
Ketoconazole Pregnancy And Lactation Text: This medication is Pregnancy Category C and it isn't know if it is safe during pregnancy. It is also excreted in breast milk and breast feeding isn't recommended.
High Dose Vitamin A Pregnancy And Lactation Text: High dose vitamin A therapy is contraindicated during pregnancy and breast feeding.
Sski Pregnancy And Lactation Text: This medication is Pregnancy Category D and isn't considered safe during pregnancy. It is excreted in breast milk.
Rifampin Pregnancy And Lactation Text: This medication is Pregnancy Category C and it isn't know if it is safe during pregnancy. It is also excreted in breast milk and should not be used if you are breast feeding.
Cephalexin Pregnancy And Lactation Text: This medication is Pregnancy Category B and considered safe during pregnancy.  It is also excreted in breast milk but can be used safely for shorter doses.
Carac Pregnancy And Lactation Text: This medication is Pregnancy Category X and contraindicated in pregnancy and in women who may become pregnant. It is unknown if this medication is excreted in breast milk.
Doxepin Counseling:  Patient advised that the medication is sedating and not to drive a car after taking this medication. Patient informed of potential adverse effects including but not limited to dry mouth, urinary retention, and blurry vision.  The patient verbalized understanding of the proper use and possible adverse effects of doxepin.  All of the patient's questions and concerns were addressed.
Bexarotene Pregnancy And Lactation Text: This medication is Pregnancy Category X and should not be given to women who are pregnant or may become pregnant. This medication should not be used if you are breast feeding.
Topical Sulfur Applications Pregnancy And Lactation Text: This medication is Pregnancy Category C and has an unknown safety profile during pregnancy. It is unknown if this topical medication is excreted in breast milk.
Metronidazole Pregnancy And Lactation Text: This medication is Pregnancy Category B and considered safe during pregnancy.  It is also excreted in breast milk.
Birth Control Pills Pregnancy And Lactation Text: This medication should be avoided if pregnant and for the first 30 days post-partum.
Eucrisa Counseling: Patient may experience a mild burning sensation during topical application. Eucrisa is not approved in children less than 2 years of age.
Rituxan Counseling:  I discussed with the patient the risks of Rituxan infusions. Side effects can include infusion reactions, severe drug rashes including mucocutaneous reactions, reactivation of latent hepatitis and other infections and rarely progressive multifocal leukoencephalopathy.  All of the patient's questions and concerns were addressed.
Otezla Counseling: The side effects of Otezla were discussed with the patient, including but not limited to worsening or new depression, weight loss, diarrhea, nausea, upper respiratory tract infection, and headache. Patient instructed to call the office should any adverse effect occur.  The patient verbalized understanding of the proper use and possible adverse effects of Otezla.  All the patient's questions and concerns were addressed.
Drysol Pregnancy And Lactation Text: This medication is considered safe during pregnancy and breast feeding.
Doxycycline Pregnancy And Lactation Text: This medication is Pregnancy Category D and not consider safe during pregnancy. It is also excreted in breast milk but is considered safe for shorter treatment courses.
Clindamycin Pregnancy And Lactation Text: This medication can be used in pregnancy if certain situations. Clindamycin is also present in breast milk.
Use Enhanced Medication Counseling?: No
Azithromycin Counseling:  I discussed with the patient the risks of azithromycin including but not limited to GI upset, allergic reaction, drug rash, diarrhea, and yeast infections.
Hydroquinone Counseling:  Patient advised that medication may result in skin irritation, lightening (hypopigmentation), dryness, and burning.  In the event of skin irritation, the patient was advised to reduce the amount of the drug applied or use it less frequently.  Rarely, spots that are treated with hydroquinone can become darker (pseudoochronosis).  Should this occur, patient instructed to stop medication and call the office. The patient verbalized understanding of the proper use and possible adverse effects of hydroquinone.  All of the patient's questions and concerns were addressed.
Topical Sulfur Applications Counseling: Topical Sulfur Counseling: Patient counseled that this medication may cause skin irritation or allergic reactions.  In the event of skin irritation, the patient was advised to reduce the amount of the drug applied or use it less frequently.   The patient verbalized understanding of the proper use and possible adverse effects of topical sulfur application.  All of the patient's questions and concerns were addressed.
Cyclosporine Counseling:  I discussed with the patient the risks of cyclosporine including but not limited to hypertension, gingival hyperplasia,myelosuppression, immunosuppression, liver damage, kidney damage, neurotoxicity, lymphoma, and serious infections. The patient understands that monitoring is required including baseline blood pressure, CBC, CMP, lipid panel and uric acid, and then 1-2 times monthly CMP and blood pressure.
Cimetidine Counseling:  I discussed with the patient the risks of Cimetidine including but not limited to gynecomastia, headache, diarrhea, nausea, drowsiness, arrhythmias, pancreatitis, skin rashes, psychosis, bone marrow suppression and kidney toxicity.
Dapsone Counseling: I discussed with the patient the risks of dapsone including but not limited to hemolytic anemia, agranulocytosis, rashes, methemoglobinemia, kidney failure, peripheral neuropathy, headaches, GI upset, and liver toxicity.  Patients who start dapsone require monitoring including baseline LFTs and weekly CBCs for the first month, then every month thereafter.  The patient verbalized understanding of the proper use and possible adverse effects of dapsone.  All of the patient's questions and concerns were addressed.
Bactrim Pregnancy And Lactation Text: This medication is Pregnancy Category D and is known to cause fetal risk.  It is also excreted in breast milk.
Sarecycline Counseling: Patient advised regarding possible photosensitivity and discoloration of the teeth, skin, lips, tongue and gums.  Patient instructed to avoid sunlight, if possible.  When exposed to sunlight, patients should wear protective clothing, sunglasses, and sunscreen.  The patient was instructed to call the office immediately if the following severe adverse effects occur:  hearing changes, easy bruising/bleeding, severe headache, or vision changes.  The patient verbalized understanding of the proper use and possible adverse effects of sarecycline.  All of the patient's questions and concerns were addressed.
Nsaids Pregnancy And Lactation Text: These medications are considered safe up to 30 weeks gestation. It is excreted in breast milk.
Infliximab Counseling:  I discussed with the patient the risks of infliximab including but not limited to myelosuppression, immunosuppression, autoimmune hepatitis, demyelinating diseases, lymphoma, and serious infections.  The patient understands that monitoring is required including a PPD at baseline and must alert us or the primary physician if symptoms of infection or other concerning signs are noted.
Humira Counseling:  I discussed with the patient the risks of adalimumab including but not limited to myelosuppression, immunosuppression, autoimmune hepatitis, demyelinating diseases, lymphoma, and serious infections.  The patient understands that monitoring is required including a PPD at baseline and must alert us or the primary physician if symptoms of infection or other concerning signs are noted.
Xolair Counseling:  Patient informed of potential adverse effects including but not limited to fever, muscle aches, rash and allergic reactions.  The patient verbalized understanding of the proper use and possible adverse effects of Xolair.  All of the patient's questions and concerns were addressed.
Isotretinoin Pregnancy And Lactation Text: This medication is Pregnancy Category X and is considered extremely dangerous during pregnancy. It is unknown if it is excreted in breast milk.
Minoxidil Counseling: Minoxidil is a topical medication which can increase blood flow where it is applied. It is uncertain how this medication increases hair growth. Side effects are uncommon and include stinging and allergic reactions.
Spironolactone Counseling: Patient advised regarding risks of diarrhea, abdominal pain, hyperkalemia, birth defects (for female patients), liver toxicity and renal toxicity. The patient may need blood work to monitor liver and kidney function and potassium levels while on therapy. The patient verbalized understanding of the proper use and possible adverse effects of spironolactone.  All of the patient's questions and concerns were addressed.
Minocycline Counseling: Patient advised regarding possible photosensitivity and discoloration of the teeth, skin, lips, tongue and gums.  Patient instructed to avoid sunlight, if possible.  When exposed to sunlight, patients should wear protective clothing, sunglasses, and sunscreen.  The patient was instructed to call the office immediately if the following severe adverse effects occur:  hearing changes, easy bruising/bleeding, severe headache, or vision changes.  The patient verbalized understanding of the proper use and possible adverse effects of minocycline.  All of the patient's questions and concerns were addressed.
Metronidazole Counseling:  I discussed with the patient the risks of metronidazole including but not limited to seizures, nausea/vomiting, a metallic taste in the mouth, nausea/vomiting and severe allergy.
Erythromycin Counseling:  I discussed with the patient the risks of erythromycin including but not limited to GI upset, allergic reaction, drug rash, diarrhea, increase in liver enzymes, and yeast infections.
High Dose Vitamin A Counseling: Side effects reviewed, pt to contact office should one occur.
Ilumya Counseling: I discussed with the patient the risks of tildrakizumab including but not limited to immunosuppression, malignancy, posterior leukoencephalopathy syndrome, and serious infections.  The patient understands that monitoring is required including a PPD at baseline and must alert us or the primary physician if symptoms of infection or other concerning signs are noted.
Hydroxychloroquine Pregnancy And Lactation Text: This medication has been shown to cause fetal harm but it isn't assigned a Pregnancy Risk Category. There are small amounts excreted in breast milk.
Taltz Counseling: I discussed with the patient the risks of ixekizumab including but not limited to immunosuppression, serious infections, worsening of inflammatory bowel disease and drug reactions.  The patient understands that monitoring is required including a PPD at baseline and must alert us or the primary physician if symptoms of infection or other concerning signs are noted.
Cyclophosphamide Counseling:  I discussed with the patient the risks of cyclophosphamide including but not limited to hair loss, hormonal abnormalities, decreased fertility, abdominal pain, diarrhea, nausea and vomiting, bone marrow suppression and infection. The patient understands that monitoring is required while taking this medication.
Topical Retinoid counseling:  Patient advised to apply a pea-sized amount only at bedtime and wait 30 minutes after washing their face before applying.  If too drying, patient may add a non-comedogenic moisturizer. The patient verbalized understanding of the proper use and possible adverse effects of retinoids.  All of the patient's questions and concerns were addressed.
Cellcept Counseling:  I discussed with the patient the risks of mycophenolate mofetil including but not limited to infection/immunosuppression, GI upset, hypokalemia, hypercholesterolemia, bone marrow suppression, lymphoproliferative disorders, malignancy, GI ulceration/bleed/perforation, colitis, interstitial lung disease, kidney failure, progressive multifocal leukoencephalopathy, and birth defects.  The patient understands that monitoring is required including a baseline creatinine and regular CBC testing. In addition, patient must alert us immediately if symptoms of infection or other concerning signs are noted.
Valtrex Counseling: I discussed with the patient the risks of valacyclovir including but not limited to kidney damage, nausea, vomiting and severe allergy.  The patient understands that if the infection seems to be worsening or is not improving, they are to call.
Methotrexate Counseling:  Patient counseled regarding adverse effects of methotrexate including but not limited to nausea, vomiting, abnormalities in liver function tests. Patients may develop mouth sores, rash, diarrhea, and abnormalities in blood counts. The patient understands that monitoring is required including LFT's and blood counts.  There is a rare possibility of scarring of the liver and lung problems that can occur when taking methotrexate. Persistent nausea, loss of appetite, pale stools, dark urine, cough, and shortness of breath should be reported immediately. Patient advised to discontinue methotrexate treatment at least three months before attempting to become pregnant.  I discussed the need for folate supplements while taking methotrexate.  These supplements can decrease side effects during methotrexate treatment. The patient verbalized understanding of the proper use and possible adverse effects of methotrexate.  All of the patient's questions and concerns were addressed.
Erythromycin Pregnancy And Lactation Text: This medication is Pregnancy Category B and is considered safe during pregnancy. It is also excreted in breast milk.
Acitretin Pregnancy And Lactation Text: This medication is Pregnancy Category X and should not be given to women who are pregnant or may become pregnant in the future. This medication is excreted in breast milk.
Skyrizi Counseling: I discussed with the patient the risks of risankizumab-rzaa including but not limited to immunosuppression, and serious infections.  The patient understands that monitoring is required including a PPD at baseline and must alert us or the primary physician if symptoms of infection or other concerning signs are noted.
Imiquimod Counseling:  I discussed with the patient the risks of imiquimod including but not limited to erythema, scaling, itching, weeping, crusting, and pain.  Patient understands that the inflammatory response to imiquimod is variable from person to person and was educated regarded proper titration schedule.  If flu-like symptoms develop, patient knows to discontinue the medication and contact us.
Rifampin Counseling: I discussed with the patient the risks of rifampin including but not limited to liver damage, kidney damage, red-orange body fluids, nausea/vomiting and severe allergy.
Drysol Counseling:  I discussed with the patient the risks of drysol/aluminum chloride including but not limited to skin rash, itching, irritation, burning.
Tremfya Counseling: I discussed with the patient the risks of guselkumab including but not limited to immunosuppression, serious infections, and drug reactions.  The patient understands that monitoring is required including a PPD at baseline and must alert us or the primary physician if symptoms of infection or other concerning signs are noted.
Colchicine Counseling:  Patient counseled regarding adverse effects including but not limited to stomach upset (nausea, vomiting, stomach pain, or diarrhea).  Patient instructed to limit alcohol consumption while taking this medication.  Colchicine may reduce blood counts especially with prolonged use.  The patient understands that monitoring of kidney function and blood counts may be required, especially at baseline. The patient verbalized understanding of the proper use and possible adverse effects of colchicine.  All of the patient's questions and concerns were addressed.
Ketoconazole Counseling:   Patient counseled regarding improving absorption with orange juice.  Adverse effects include but are not limited to breast enlargement, headache, diarrhea, nausea, upset stomach, liver function test abnormalities, taste disturbance, and stomach pain.  There is a rare possibility of liver failure that can occur when taking ketoconazole. The patient understands that monitoring of LFTs may be required, especially at baseline. The patient verbalized understanding of the proper use and possible adverse effects of ketoconazole.  All of the patient's questions and concerns were addressed.
Otezla Pregnancy And Lactation Text: This medication is Pregnancy Category C and it isn't known if it is safe during pregnancy. It is unknown if it is excreted in breast milk.
Hydroxyzine Pregnancy And Lactation Text: This medication is not safe during pregnancy and should not be taken. It is also excreted in breast milk and breast feeding isn't recommended.
Prednisone Counseling:  I discussed with the patient the risks of prolonged use of prednisone including but not limited to weight gain, insomnia, osteoporosis, mood changes, diabetes, susceptibility to infection, glaucoma and high blood pressure.  In cases where prednisone use is prolonged, patients should be monitored with blood pressure checks, serum glucose levels and an eye exam.  Additionally, the patient may need to be placed on GI prophylaxis, PCP prophylaxis, and calcium and vitamin D supplementation and/or a bisphosphonate.  The patient verbalized understanding of the proper use and the possible adverse effects of prednisone.  All of the patient's questions and concerns were addressed.
Siliq Counseling:  I discussed with the patient the risks of Siliq including but not limited to new or worsening depression, suicidal thoughts and behavior, immunosuppression, malignancy, posterior leukoencephalopathy syndrome, and serious infections.  The patient understands that monitoring is required including a PPD at baseline and must alert us or the primary physician if symptoms of infection or other concerning signs are noted. There is also a special program designed to monitor depression which is required with Siliq.
SSKI Counseling:  I discussed with the patient the risks of SSKI including but not limited to thyroid abnormalities, metallic taste, GI upset, fever, headache, acne, arthralgias, paraesthesias, lymphadenopathy, easy bleeding, arrhythmias, and allergic reaction.
Hydroxyzine Counseling: Patient advised that the medication is sedating and not to drive a car after taking this medication.  Patient informed of potential adverse effects including but not limited to dry mouth, urinary retention, and blurry vision.  The patient verbalized understanding of the proper use and possible adverse effects of hydroxyzine.  All of the patient's questions and concerns were addressed.
Glycopyrrolate Counseling:  I discussed with the patient the risks of glycopyrrolate including but not limited to skin rash, drowsiness, dry mouth, difficulty urinating, and blurred vision.
Dupixent Counseling: I discussed with the patient the risks of dupilumab including but not limited to eye infection and irritation, cold sores, injection site reactions, worsening of asthma, allergic reactions and increased risk of parasitic infection.  Live vaccines should be avoided while taking dupilumab. Dupilumab will also interact with certain medications such as warfarin and cyclosporine. The patient understands that monitoring is required and they must alert us or the primary physician if symptoms of infection or other concerning signs are noted.
Bexarotene Counseling:  I discussed with the patient the risks of bexarotene including but not limited to hair loss, dry lips/skin/eyes, liver abnormalities, hyperlipidemia, pancreatitis, depression/suicidal ideation, photosensitivity, drug rash/allergic reactions, hypothyroidism, anemia, leukopenia, infection, cataracts, and teratogenicity.  Patient understands that they will need regular blood tests to check lipid profile, liver function tests, white blood cell count, thyroid function tests and pregnancy test if applicable.
Cyclophosphamide Pregnancy And Lactation Text: This medication is Pregnancy Category D and it isn't considered safe during pregnancy. This medication is excreted in breast milk.
Arava Counseling:  Patient counseled regarding adverse effects of Arava including but not limited to nausea, vomiting, abnormalities in liver function tests. Patients may develop mouth sores, rash, diarrhea, and abnormalities in blood counts. The patient understands that monitoring is required including LFTs and blood counts.  There is a rare possibility of scarring of the liver and lung problems that can occur when taking methotrexate. Persistent nausea, loss of appetite, pale stools, dark urine, cough, and shortness of breath should be reported immediately. Patient advised to discontinue Arava treatment and consult with a physician prior to attempting conception. The patient will have to undergo a treatment to eliminate Arava from the body prior to conception.

## 2023-02-14 NOTE — PROCEDURE: PRESCRIPTION MEDICATION MANAGEMENT
Render In Strict Bullet Format?: No
Detail Level: Zone
Plan: Doing well\\nIncrease to 60mg, call with labs & adjust dosing if needed\\nMonitor feelings of being less social. Pt will call office if mood worsens, feelings of sadness, depression or suicidal thoughts.

## 2023-02-14 NOTE — PROCEDURE: ISOTRETINOIN MONITORING
Retinoid Dermatitis Aggressive Treatment: I recommended more frequent application of Cetaphil or CeraVe to the areas of dermatitis. I also prescribed a topical steroid for twice daily use until the dermatitis resolves.
Upper Range (In Mg/Kg): 150
Cheilitis Aggressive Treatment: I recommended application of Vaseline or Aquaphor numerous times a day (as often as every hour) and before going to bed. I also prescribed a topical steroid for twice daily use.
Show Text Field For Brand Names Of Contraception?: Yes
Dosing Month 1 (Required For Cumulative Dosing): 30mg Daily
Any Nosebleeds?: No
Male Completion Statement: After discussing his treatment course we decided to discontinue isotretinoin therapy at this time. He shouldn't donate blood for one month after the last dose. He should call with any new symptoms of depression.
Myalgia Treatment: I explained this is common when taking isotretinoin. If this worsens they will contact us. They may try OTC ibuprofen.
Cheilitis Normal Treatment: I recommended application of Vaseline or Aquaphor numerous times a day (as often as every hour) and before going to bed.
Myalgia Monitoring: I explained this is common when taking isotretinoin. If this worsens they will contact us.
Xerosis Normal Treatment: I recommended application of Cetaphil or CeraVe numerous times a day and before going to bed to all dry areas.
Is Retinoid Dermatitis Present?: Yes - Normal Treatment
Use Therapeutic Ranged Or Therapeutic Target: please select Range or Target
Headache Monitoring: I recommended monitoring the headaches for now. There is no evidence of increased intracranial pressure. They were instructed to call if the headaches are worsening.
Retinoid Dermatitis Normal Treatment: I recommended more frequent application of Cetaphil or CeraVe to the areas of dermatitis.
Are Labs Available For Review?: No- Pending
Kilograms Preamble Statement (Weight Entered In Details Tab): Reported Weight in kilograms:
Detail Level: Zone
Lower Range (In Mg/Kg): 120
Xerosis Aggressive Treatment: I recommended application of Cetaphil or CeraVe numerous times a day and before going to bed to all dry areas. I also prescribed a topical steroid for twice daily use.
Months Of Therapy Completed: 1
Female Completion Statement: After discussing her treatment course we decided to discontinue isotretinoin therapy at this time. I explained that she would need to continue her birth control methods for at least one month after the last dosage. She should also get a pregnancy test one month after the last dose. She shouldn't donate blood for one month after the last dose. She should call with any new symptoms of depression.
Xerosis Aggressive Treatment: I recommended application of Cetaphil or CeraVe numerous times a day going to bed to all dry areas. I also prescribed a topical steroid for twice daily use.
What Is The Patient's Gender: Female
Counseling Text: I reviewed the side effect in detail. Patient should get monthly blood tests, not donate blood, not drive at night if vision affected, and not share medication.
Nosebleeds Normal Treatment: I explained this is common when taking isotretinoin. I recommended saline mist in each nostril multiple times a day. If this worsens they will contact us.
Xerosis Normal Treatment: I recommended application of Cetaphil or CeraVe numerous times a day going to bed to all dry areas.
Hypercholesterolemia Monitoring: I explained this is common when taking isotretinoin. We will monitor closely.
Female Pregnancy Counseling Text: Female patients should also be on two forms of birth control while taking this medication and for one month after their last dose.
Pounds Preamble Statement (Weight Entered In Details Tab): Reported Weight in pounds:
Next Month's Dosage: Continue Current Dosage
Target Cumulative Dosage (In Mg/Kg): 135
Weight Units: pounds

## 2023-03-17 ENCOUNTER — APPOINTMENT (OUTPATIENT)
Dept: URBAN - METROPOLITAN AREA CLINIC 258 | Age: 23
Setting detail: DERMATOLOGY
End: 2023-03-17

## 2023-03-17 VITALS — HEIGHT: 66.5 IN | WEIGHT: 150 LBS

## 2023-03-17 DIAGNOSIS — Z79.899 OTHER LONG TERM (CURRENT) DRUG THERAPY: ICD-10-CM

## 2023-03-17 DIAGNOSIS — L70.0 ACNE VULGARIS: ICD-10-CM

## 2023-03-17 PROCEDURE — OTHER ISOTRETINOIN MONITORING: OTHER

## 2023-03-17 PROCEDURE — OTHER COUNSELING: OTHER

## 2023-03-17 PROCEDURE — OTHER MIPS QUALITY: OTHER

## 2023-03-17 PROCEDURE — 99214 OFFICE O/P EST MOD 30 MIN: CPT

## 2023-03-17 PROCEDURE — 36415 COLL VENOUS BLD VENIPUNCTURE: CPT

## 2023-03-17 PROCEDURE — OTHER ORDER TESTS: OTHER

## 2023-03-17 PROCEDURE — OTHER URINE PREGNANCY TEST: OTHER

## 2023-03-17 PROCEDURE — OTHER HIGH RISK MEDICATION MONITORING: OTHER

## 2023-03-17 PROCEDURE — OTHER PRESCRIPTION MEDICATION MANAGEMENT: OTHER

## 2023-03-17 PROCEDURE — OTHER PRESCRIPTION: OTHER

## 2023-03-17 PROCEDURE — OTHER VENIPUNCTURE: OTHER

## 2023-03-17 PROCEDURE — 81025 URINE PREGNANCY TEST: CPT

## 2023-03-17 RX ORDER — ISOTRETINOIN 30 MG/1
CAPSULE, LIQUID FILLED ORAL
Qty: 60 | Refills: 0 | Status: ERX

## 2023-03-17 NOTE — PROCEDURE: HIGH RISK MEDICATION MONITORING
Humira Counseling:  I discussed with the patient the risks of adalimumab including but not limited to myelosuppression, immunosuppression, autoimmune hepatitis, demyelinating diseases, lymphoma, and serious infections.  The patient understands that monitoring is required including a PPD at baseline and must alert us or the primary physician if symptoms of infection or other concerning signs are noted.
Fluconazole Pregnancy And Lactation Text: This medication is Pregnancy Category C and it isn't know if it is safe during pregnancy. It is also excreted in breast milk.
Tetracycline Pregnancy And Lactation Text: This medication is Pregnancy Category D and not consider safe during pregnancy. It is also excreted in breast milk.
Cellcept Counseling:  I discussed with the patient the risks of mycophenolate mofetil including but not limited to infection/immunosuppression, GI upset, hypokalemia, hypercholesterolemia, bone marrow suppression, lymphoproliferative disorders, malignancy, GI ulceration/bleed/perforation, colitis, interstitial lung disease, kidney failure, progressive multifocal leukoencephalopathy, and birth defects.  The patient understands that monitoring is required including a baseline creatinine and regular CBC testing. In addition, patient must alert us immediately if symptoms of infection or other concerning signs are noted.
Spironolactone Pregnancy And Lactation Text: This medication can cause feminization of the male fetus and should be avoided during pregnancy. The active metabolite is also found in breast milk.
Solaraze Pregnancy And Lactation Text: This medication is Pregnancy Category B and is considered safe. There is some data to suggest avoiding during the third trimester. It is unknown if this medication is excreted in breast milk.
Drysol Pregnancy And Lactation Text: This medication is considered safe during pregnancy and breast feeding.
Cimetidine Counseling:  I discussed with the patient the risks of Cimetidine including but not limited to gynecomastia, headache, diarrhea, nausea, drowsiness, arrhythmias, pancreatitis, skin rashes, psychosis, bone marrow suppression and kidney toxicity.
Oxybutynin Counseling:  I discussed with the patient the risks of oxybutynin including but not limited to skin rash, drowsiness, dry mouth, difficulty urinating, and blurred vision.
Glycopyrrolate Pregnancy And Lactation Text: This medication is Pregnancy Category B and is considered safe during pregnancy. It is unknown if it is excreted breast milk.
Picato Pregnancy And Lactation Text: This medication is Pregnancy Category C. It is unknown if this medication is excreted in breast milk.
Topical Sulfur Applications Pregnancy And Lactation Text: This medication is Pregnancy Category C and has an unknown safety profile during pregnancy. It is unknown if this topical medication is excreted in breast milk.
Fluconazole Counseling:  Patient counseled regarding adverse effects of fluconazole including but not limited to headache, diarrhea, nausea, upset stomach, liver function test abnormalities, taste disturbance, and stomach pain.  There is a rare possibility of liver failure that can occur when taking fluconazole.  The patient understands that monitoring of LFTs and kidney function test may be required, especially at baseline. The patient verbalized understanding of the proper use and possible adverse effects of fluconazole.  All of the patient's questions and concerns were addressed.
SSKI Counseling:  I discussed with the patient the risks of SSKI including but not limited to thyroid abnormalities, metallic taste, GI upset, fever, headache, acne, arthralgias, paraesthesias, lymphadenopathy, easy bleeding, arrhythmias, and allergic reaction.
Tremfya Pregnancy And Lactation Text: The risk during pregnancy and breastfeeding is uncertain with this medication.
Cosentyx Counseling:  I discussed with the patient the risks of Cosentyx including but not limited to worsening of Crohn's disease, immunosuppression, allergic reactions and infections.  The patient understands that monitoring is required including a PPD at baseline and must alert us or the primary physician if symptoms of infection or other concerning signs are noted.
Bactrim Pregnancy And Lactation Text: This medication is Pregnancy Category D and is known to cause fetal risk.  It is also excreted in breast milk.
Minoxidil Counseling: Minoxidil is a topical medication which can increase blood flow where it is applied. It is uncertain how this medication increases hair growth. Side effects are uncommon and include stinging and allergic reactions.
Enbrel Counseling:  I discussed with the patient the risks of etanercept including but not limited to myelosuppression, immunosuppression, autoimmune hepatitis, demyelinating diseases, lymphoma, and infections.  The patient understands that monitoring is required including a PPD at baseline and must alert us or the primary physician if symptoms of infection or other concerning signs are noted.
Infliximab Pregnancy And Lactation Text: This medication is Pregnancy Category B and is considered safe during pregnancy. It is unknown if this medication is excreted in breast milk.
Erivedge Pregnancy And Lactation Text: This medication is Pregnancy Category X and is absolutely contraindicated during pregnancy. It is unknown if it is excreted in breast milk.
Clofazimine Pregnancy And Lactation Text: This medication is Pregnancy Category C and isn't considered safe during pregnancy. It is excreted in breast milk.
Minoxidil Pregnancy And Lactation Text: This medication has not been assigned a Pregnancy Risk Category but animal studies failed to show danger with the topical medication. It is unknown if the medication is excreted in breast milk.
Erythromycin Counseling:  I discussed with the patient the risks of erythromycin including but not limited to GI upset, allergic reaction, drug rash, diarrhea, increase in liver enzymes, and yeast infections.
Albendazole Counseling:  I discussed with the patient the risks of albendazole including but not limited to cytopenia, kidney damage, nausea/vomiting and severe allergy.  The patient understands that this medication is being used in an off-label manner.
Quinolones Counseling:  I discussed with the patient the risks of fluoroquinolones including but not limited to GI upset, allergic reaction, drug rash, diarrhea, dizziness, photosensitivity, yeast infections, liver function test abnormalities, tendonitis/tendon rupture.
Cyclophosphamide Counseling:  I discussed with the patient the risks of cyclophosphamide including but not limited to hair loss, hormonal abnormalities, decreased fertility, abdominal pain, diarrhea, nausea and vomiting, bone marrow suppression and infection. The patient understands that monitoring is required while taking this medication.
Rifampin Pregnancy And Lactation Text: This medication is Pregnancy Category C and it isn't know if it is safe during pregnancy. It is also excreted in breast milk and should not be used if you are breast feeding.
Spironolactone Counseling: Patient advised regarding risks of diarrhea, abdominal pain, hyperkalemia, birth defects (for female patients), liver toxicity and renal toxicity. The patient may need blood work to monitor liver and kidney function and potassium levels while on therapy. The patient verbalized understanding of the proper use and possible adverse effects of spironolactone.  All of the patient's questions and concerns were addressed.
Terbinafine Pregnancy And Lactation Text: This medication is Pregnancy Category B and is considered safe during pregnancy. It is also excreted in breast milk and breast feeding isn't recommended.
Eucrisa Counseling: Patient may experience a mild burning sensation during topical application. Eucrisa is not approved in children less than 2 years of age.
Dapsone Pregnancy And Lactation Text: This medication is Pregnancy Category C and is not considered safe during pregnancy or breast feeding.
Isotretinoin Counseling: Patient should get monthly blood tests, not donate blood, not drive at night if vision affected, not share medication, and not undergo elective surgery for 6 months after tx completed. Side effects reviewed, pt to contact office should one occur.
Protopic Counseling: Patient may experience a mild burning sensation during topical application. Protopic is not approved in children less than 2 years of age. There have been case reports of hematologic and skin malignancies in patients using topical calcineurin inhibitors although causality is questionable.
Topical Clindamycin Pregnancy And Lactation Text: This medication is Pregnancy Category B and is considered safe during pregnancy. It is unknown if it is excreted in breast milk.
High Dose Vitamin A Pregnancy And Lactation Text: High dose vitamin A therapy is contraindicated during pregnancy and breast feeding.
Nsaids Counseling: NSAID Counseling: I discussed with the patient that NSAIDs should be taken with food. Prolonged use of NSAIDs can result in the development of stomach ulcers.  Patient advised to stop taking NSAIDs if abdominal pain occurs.  The patient verbalized understanding of the proper use and possible adverse effects of NSAIDs.  All of the patient's questions and concerns were addressed.
Benzoyl Peroxide Pregnancy And Lactation Text: This medication is Pregnancy Category C. It is unknown if benzoyl peroxide is excreted in breast milk.
Xeljanz Counseling: I discussed with the patient the risks of Xeljanz therapy including increased risk of infection, liver issues, headache, diarrhea, or cold symptoms. Live vaccines should be avoided. They were instructed to call if they have any problems.
Bexarotene Counseling:  I discussed with the patient the risks of bexarotene including but not limited to hair loss, dry lips/skin/eyes, liver abnormalities, hyperlipidemia, pancreatitis, depression/suicidal ideation, photosensitivity, drug rash/allergic reactions, hypothyroidism, anemia, leukopenia, infection, cataracts, and teratogenicity.  Patient understands that they will need regular blood tests to check lipid profile, liver function tests, white blood cell count, thyroid function tests and pregnancy test if applicable.
Azithromycin Counseling:  I discussed with the patient the risks of azithromycin including but not limited to GI upset, allergic reaction, drug rash, diarrhea, and yeast infections.
Valtrex Counseling: I discussed with the patient the risks of valacyclovir including but not limited to kidney damage, nausea, vomiting and severe allergy.  The patient understands that if the infection seems to be worsening or is not improving, they are to call.
Doxepin Pregnancy And Lactation Text: This medication is Pregnancy Category C and it isn't known if it is safe during pregnancy. It is also excreted in breast milk and breast feeding isn't recommended.
Azathioprine Counseling:  I discussed with the patient the risks of azathioprine including but not limited to myelosuppression, immunosuppression, hepatotoxicity, lymphoma, and infections.  The patient understands that monitoring is required including baseline LFTs, Creatinine, possible TPMP genotyping and weekly CBCs for the first month and then every 2 weeks thereafter.  The patient verbalized understanding of the proper use and possible adverse effects of azathioprine.  All of the patient's questions and concerns were addressed.
Picato Counseling:  I discussed with the patient the risks of Picato including but not limited to erythema, scaling, itching, weeping, crusting, and pain.
Doxepin Counseling:  Patient advised that the medication is sedating and not to drive a car after taking this medication. Patient informed of potential adverse effects including but not limited to dry mouth, urinary retention, and blurry vision.  The patient verbalized understanding of the proper use and possible adverse effects of doxepin.  All of the patient's questions and concerns were addressed.
Xolair Pregnancy And Lactation Text: This medication is Pregnancy Category B and is considered safe during pregnancy. This medication is excreted in breast milk.
Odomzo Counseling- I discussed with the patient the risks of Odomzo including but not limited to nausea, vomiting, diarrhea, constipation, weight loss, changes in the sense of taste, decreased appetite, muscle spasms, and hair loss.  The patient verbalized understanding of the proper use and possible adverse effects of Odomzo.  All of the patient's questions and concerns were addressed.
Methotrexate Pregnancy And Lactation Text: This medication is Pregnancy Category X and is known to cause fetal harm. This medication is excreted in breast milk.
Infliximab Counseling:  I discussed with the patient the risks of infliximab including but not limited to myelosuppression, immunosuppression, autoimmune hepatitis, demyelinating diseases, lymphoma, and serious infections.  The patient understands that monitoring is required including a PPD at baseline and must alert us or the primary physician if symptoms of infection or other concerning signs are noted.
Simponi Counseling:  I discussed with the patient the risks of golimumab including but not limited to myelosuppression, immunosuppression, autoimmune hepatitis, demyelinating diseases, lymphoma, and serious infections.  The patient understands that monitoring is required including a PPD at baseline and must alert us or the primary physician if symptoms of infection or other concerning signs are noted.
Albendazole Pregnancy And Lactation Text: This medication is Pregnancy Category C and it isn't known if it is safe during pregnancy. It is also excreted in breast milk.
Erythromycin Pregnancy And Lactation Text: This medication is Pregnancy Category B and is considered safe during pregnancy. It is also excreted in breast milk.
Clindamycin Pregnancy And Lactation Text: This medication can be used in pregnancy if certain situations. Clindamycin is also present in breast milk.
Cephalexin Pregnancy And Lactation Text: This medication is Pregnancy Category B and considered safe during pregnancy.  It is also excreted in breast milk but can be used safely for shorter doses.
Imiquimod Counseling:  I discussed with the patient the risks of imiquimod including but not limited to erythema, scaling, itching, weeping, crusting, and pain.  Patient understands that the inflammatory response to imiquimod is variable from person to person and was educated regarded proper titration schedule.  If flu-like symptoms develop, patient knows to discontinue the medication and contact us.
Hydroxychloroquine Pregnancy And Lactation Text: This medication has been shown to cause fetal harm but it isn't assigned a Pregnancy Risk Category. There are small amounts excreted in breast milk.
Rituxan Counseling:  I discussed with the patient the risks of Rituxan infusions. Side effects can include infusion reactions, severe drug rashes including mucocutaneous reactions, reactivation of latent hepatitis and other infections and rarely progressive multifocal leukoencephalopathy.  All of the patient's questions and concerns were addressed.
Tazorac Pregnancy And Lactation Text: This medication is not safe during pregnancy. It is unknown if this medication is excreted in breast milk.
Protopic Pregnancy And Lactation Text: This medication is Pregnancy Category C. It is unknown if this medication is excreted in breast milk when applied topically.
Cephalexin Counseling: I counseled the patient regarding use of cephalexin as an antibiotic for prophylactic and/or therapeutic purposes. Cephalexin (commonly prescribed under brand name Keflex) is a cephalosporin antibiotic which is active against numerous classes of bacteria, including most skin bacteria. Side effects may include nausea, diarrhea, gastrointestinal upset, rash, hives, yeast infections, and in rare cases, hepatitis, kidney disease, seizures, fever, confusion, neurologic symptoms, and others. Patients with severe allergies to penicillin medications are cautioned that there is about a 10% incidence of cross-reactivity with cephalosporins. When possible, patients with penicillin allergies should use alternatives to cephalosporins for antibiotic therapy.
Tazorac Counseling:  Patient advised that medication is irritating and drying.  Patient may need to apply sparingly and wash off after an hour before eventually leaving it on overnight.  The patient verbalized understanding of the proper use and possible adverse effects of tazorac.  All of the patient's questions and concerns were addressed.
Prednisone Counseling:  I discussed with the patient the risks of prolonged use of prednisone including but not limited to weight gain, insomnia, osteoporosis, mood changes, diabetes, susceptibility to infection, glaucoma and high blood pressure.  In cases where prednisone use is prolonged, patients should be monitored with blood pressure checks, serum glucose levels and an eye exam.  Additionally, the patient may need to be placed on GI prophylaxis, PCP prophylaxis, and calcium and vitamin D supplementation and/or a bisphosphonate.  The patient verbalized understanding of the proper use and the possible adverse effects of prednisone.  All of the patient's questions and concerns were addressed.
Tetracycline Counseling: Patient counseled regarding possible photosensitivity and increased risk for sunburn.  Patient instructed to avoid sunlight, if possible.  When exposed to sunlight, patients should wear protective clothing, sunglasses, and sunscreen.  The patient was instructed to call the office immediately if the following severe adverse effects occur:  hearing changes, easy bruising/bleeding, severe headache, or vision changes.  The patient verbalized understanding of the proper use and possible adverse effects of tetracycline.  All of the patient's questions and concerns were addressed. Patient understands to avoid pregnancy while on therapy due to potential birth defects.
Itraconazole Counseling:  I discussed with the patient the risks of itraconazole including but not limited to liver damage, nausea/vomiting, neuropathy, and severe allergy.  The patient understands that this medication is best absorbed when taken with acidic beverages such as non-diet cola or ginger ale.  The patient understands that monitoring is required including baseline LFTs and repeat LFTs at intervals.  The patient understands that they are to contact us or the primary physician if concerning signs are noted.
Prednisone Pregnancy And Lactation Text: This medication is Pregnancy Category C and it isn't know if it is safe during pregnancy. This medication is excreted in breast milk.
Minocycline Counseling: Patient advised regarding possible photosensitivity and discoloration of the teeth, skin, lips, tongue and gums.  Patient instructed to avoid sunlight, if possible.  When exposed to sunlight, patients should wear protective clothing, sunglasses, and sunscreen.  The patient was instructed to call the office immediately if the following severe adverse effects occur:  hearing changes, easy bruising/bleeding, severe headache, or vision changes.  The patient verbalized understanding of the proper use and possible adverse effects of minocycline.  All of the patient's questions and concerns were addressed.
Solaraze Counseling:  I discussed with the patient the risks of Solaraze including but not limited to erythema, scaling, itching, weeping, crusting, and pain.
Ketoconazole Pregnancy And Lactation Text: This medication is Pregnancy Category C and it isn't know if it is safe during pregnancy. It is also excreted in breast milk and breast feeding isn't recommended.
Siliq Counseling:  I discussed with the patient the risks of Siliq including but not limited to new or worsening depression, suicidal thoughts and behavior, immunosuppression, malignancy, posterior leukoencephalopathy syndrome, and serious infections.  The patient understands that monitoring is required including a PPD at baseline and must alert us or the primary physician if symptoms of infection or other concerning signs are noted. There is also a special program designed to monitor depression which is required with Siliq.
Isotretinoin Pregnancy And Lactation Text: This medication is Pregnancy Category X and is considered extremely dangerous during pregnancy. It is unknown if it is excreted in breast milk.
Taltz Counseling: I discussed with the patient the risks of ixekizumab including but not limited to immunosuppression, serious infections, worsening of inflammatory bowel disease and drug reactions.  The patient understands that monitoring is required including a PPD at baseline and must alert us or the primary physician if symptoms of infection or other concerning signs are noted.
Hydroxyzine Pregnancy And Lactation Text: This medication is not safe during pregnancy and should not be taken. It is also excreted in breast milk and breast feeding isn't recommended.
Doxycycline Pregnancy And Lactation Text: This medication is Pregnancy Category D and not consider safe during pregnancy. It is also excreted in breast milk but is considered safe for shorter treatment courses.
Acitretin Counseling:  I discussed with the patient the risks of acitretin including but not limited to hair loss, dry lips/skin/eyes, liver damage, hyperlipidemia, depression/suicidal ideation, photosensitivity.  Serious rare side effects can include but are not limited to pancreatitis, pseudotumor cerebri, bony changes, clot formation/stroke/heart attack.  Patient understands that alcohol is contraindicated since it can result in liver toxicity and significantly prolong the elimination of the drug by many years.
Griseofulvin Pregnancy And Lactation Text: This medication is Pregnancy Category X and is known to cause serious birth defects. It is unknown if this medication is excreted in breast milk but breast feeding should be avoided.
Hydroquinone Counseling:  Patient advised that medication may result in skin irritation, lightening (hypopigmentation), dryness, and burning.  In the event of skin irritation, the patient was advised to reduce the amount of the drug applied or use it less frequently.  Rarely, spots that are treated with hydroquinone can become darker (pseudoochronosis).  Should this occur, patient instructed to stop medication and call the office. The patient verbalized understanding of the proper use and possible adverse effects of hydroquinone.  All of the patient's questions and concerns were addressed.
Xelmerlinz Pregnancy And Lactation Text: This medication is Pregnancy Category D and is not considered safe during pregnancy.  The risk during breast feeding is also uncertain.
Doxycycline Counseling:  Patient counseled regarding possible photosensitivity and increased risk for sunburn.  Patient instructed to avoid sunlight, if possible.  When exposed to sunlight, patients should wear protective clothing, sunglasses, and sunscreen.  The patient was instructed to call the office immediately if the following severe adverse effects occur:  hearing changes, easy bruising/bleeding, severe headache, or vision changes.  The patient verbalized understanding of the proper use and possible adverse effects of doxycycline.  All of the patient's questions and concerns were addressed.
Birth Control Pills Counseling: Birth Control Pill Counseling: I discussed with the patient the potential side effects of OCPs including but not limited to increased risk of stroke, heart attack, thrombophlebitis, deep venous thrombosis, hepatic adenomas, breast changes, GI upset, headaches, and depression.  The patient verbalized understanding of the proper use and possible adverse effects of OCPs. All of the patient's questions and concerns were addressed.
Cimzia Pregnancy And Lactation Text: This medication crosses the placenta but can be considered safe in certain situations. Cimzia may be excreted in breast milk.
Topical Sulfur Applications Counseling: Topical Sulfur Counseling: Patient counseled that this medication may cause skin irritation or allergic reactions.  In the event of skin irritation, the patient was advised to reduce the amount of the drug applied or use it less frequently.   The patient verbalized understanding of the proper use and possible adverse effects of topical sulfur application.  All of the patient's questions and concerns were addressed.
Ilumya Counseling: I discussed with the patient the risks of tildrakizumab including but not limited to immunosuppression, malignancy, posterior leukoencephalopathy syndrome, and serious infections.  The patient understands that monitoring is required including a PPD at baseline and must alert us or the primary physician if symptoms of infection or other concerning signs are noted.
Metronidazole Counseling:  I discussed with the patient the risks of metronidazole including but not limited to seizures, nausea/vomiting, a metallic taste in the mouth, nausea/vomiting and severe allergy.
Benzoyl Peroxide Counseling: Patient counseled that medicine may cause skin irritation and bleach clothing.  In the event of skin irritation, the patient was advised to reduce the amount of the drug applied or use it less frequently.   The patient verbalized understanding of the proper use and possible adverse effects of benzoyl peroxide.  All of the patient's questions and concerns were addressed.
Include Pregnancy/Lactation Warning?: No
Dupixent Counseling: I discussed with the patient the risks of dupilumab including but not limited to eye infection and irritation, cold sores, injection site reactions, worsening of asthma, allergic reactions and increased risk of parasitic infection.  Live vaccines should be avoided while taking dupilumab. Dupilumab will also interact with certain medications such as warfarin and cyclosporine. The patient understands that monitoring is required and they must alert us or the primary physician if symptoms of infection or other concerning signs are noted.
Tremfya Counseling: I discussed with the patient the risks of guselkumab including but not limited to immunosuppression, serious infections, and drug reactions.  The patient understands that monitoring is required including a PPD at baseline and must alert us or the primary physician if symptoms of infection or other concerning signs are noted.
Clofazimine Counseling:  I discussed with the patient the risks of clofazimine including but not limited to skin and eye pigmentation, liver damage, nausea/vomiting, gastrointestinal bleeding and allergy.
Glycopyrrolate Counseling:  I discussed with the patient the risks of glycopyrrolate including but not limited to skin rash, drowsiness, dry mouth, difficulty urinating, and blurred vision.
Xolair Counseling:  Patient informed of potential adverse effects including but not limited to fever, muscle aches, rash and allergic reactions.  The patient verbalized understanding of the proper use and possible adverse effects of Xolair.  All of the patient's questions and concerns were addressed.
Ketoconazole Counseling:   Patient counseled regarding improving absorption with orange juice.  Adverse effects include but are not limited to breast enlargement, headache, diarrhea, nausea, upset stomach, liver function test abnormalities, taste disturbance, and stomach pain.  There is a rare possibility of liver failure that can occur when taking ketoconazole. The patient understands that monitoring of LFTs may be required, especially at baseline. The patient verbalized understanding of the proper use and possible adverse effects of ketoconazole.  All of the patient's questions and concerns were addressed.
Azathioprine Pregnancy And Lactation Text: This medication is Pregnancy Category D and isn't considered safe during pregnancy. It is unknown if this medication is excreted in breast milk.
Birth Control Pills Pregnancy And Lactation Text: This medication should be avoided if pregnant and for the first 30 days post-partum.
Terbinafine Counseling: Patient counseling regarding adverse effects of terbinafine including but not limited to headache, diarrhea, rash, upset stomach, liver function test abnormalities, itching, taste/smell disturbance, nausea, abdominal pain, and flatulence.  There is a rare possibility of liver failure that can occur when taking terbinafine.  The patient understands that a baseline LFT and kidney function test may be required. The patient verbalized understanding of the proper use and possible adverse effects of terbinafine.  All of the patient's questions and concerns were addressed.
Gabapentin Counseling: I discussed with the patient the risks of gabapentin including but not limited to dizziness, somnolence, fatigue and ataxia.
Metronidazole Pregnancy And Lactation Text: This medication is Pregnancy Category B and considered safe during pregnancy.  It is also excreted in breast milk.
Erivedge Counseling- I discussed with the patient the risks of Erivedge including but not limited to nausea, vomiting, diarrhea, constipation, weight loss, changes in the sense of taste, decreased appetite, muscle spasms, and hair loss.  The patient verbalized understanding of the proper use and possible adverse effects of Erivedge.  All of the patient's questions and concerns were addressed.
5-Fu Counseling: 5-Fluorouracil Counseling:  I discussed with the patient the risks of 5-fluorouracil including but not limited to erythema, scaling, itching, weeping, crusting, and pain.
Carac Counseling:  I discussed with the patient the risks of Carac including but not limited to erythema, scaling, itching, weeping, crusting, and pain.
5-Fu Pregnancy And Lactation Text: This medication is Pregnancy Category X and contraindicated in pregnancy and in women who may become pregnant. It is unknown if this medication is excreted in breast milk.
Valtrex Pregnancy And Lactation Text: this medication is Pregnancy Category B and is considered safe during pregnancy. This medication is not directly found in breast milk but it's metabolite acyclovir is present.
Skyrizi Counseling: I discussed with the patient the risks of risankizumab-rzaa including but not limited to immunosuppression, and serious infections.  The patient understands that monitoring is required including a PPD at baseline and must alert us or the primary physician if symptoms of infection or other concerning signs are noted.
Clindamycin Counseling: I counseled the patient regarding use of clindamycin as an antibiotic for prophylactic and/or therapeutic purposes. Clindamycin is active against numerous classes of bacteria, including skin bacteria. Side effects may include nausea, diarrhea, gastrointestinal upset, rash, hives, yeast infections, and in rare cases, colitis.
Otezla Pregnancy And Lactation Text: This medication is Pregnancy Category C and it isn't known if it is safe during pregnancy. It is unknown if it is excreted in breast milk.
Dupixent Pregnancy And Lactation Text: This medication likely crosses the placenta but the risk for the fetus is uncertain. This medication is excreted in breast milk.
Topical Clindamycin Counseling: Patient counseled that this medication may cause skin irritation or allergic reactions.  In the event of skin irritation, the patient was advised to reduce the amount of the drug applied or use it less frequently.   The patient verbalized understanding of the proper use and possible adverse effects of clindamycin.  All of the patient's questions and concerns were addressed.
Cyclosporine Counseling:  I discussed with the patient the risks of cyclosporine including but not limited to hypertension, gingival hyperplasia,myelosuppression, immunosuppression, liver damage, kidney damage, neurotoxicity, lymphoma, and serious infections. The patient understands that monitoring is required including baseline blood pressure, CBC, CMP, lipid panel and uric acid, and then 1-2 times monthly CMP and blood pressure.
Topical Retinoid counseling:  Patient advised to apply a pea-sized amount only at bedtime and wait 30 minutes after washing their face before applying.  If too drying, patient may add a non-comedogenic moisturizer. The patient verbalized understanding of the proper use and possible adverse effects of retinoids.  All of the patient's questions and concerns were addressed.
Hydroxyzine Counseling: Patient advised that the medication is sedating and not to drive a car after taking this medication.  Patient informed of potential adverse effects including but not limited to dry mouth, urinary retention, and blurry vision.  The patient verbalized understanding of the proper use and possible adverse effects of hydroxyzine.  All of the patient's questions and concerns were addressed.
Sarecycline Counseling: Patient advised regarding possible photosensitivity and discoloration of the teeth, skin, lips, tongue and gums.  Patient instructed to avoid sunlight, if possible.  When exposed to sunlight, patients should wear protective clothing, sunglasses, and sunscreen.  The patient was instructed to call the office immediately if the following severe adverse effects occur:  hearing changes, easy bruising/bleeding, severe headache, or vision changes.  The patient verbalized understanding of the proper use and possible adverse effects of sarecycline.  All of the patient's questions and concerns were addressed.
Drysol Counseling:  I discussed with the patient the risks of drysol/aluminum chloride including but not limited to skin rash, itching, irritation, burning.
Nsaids Pregnancy And Lactation Text: These medications are considered safe up to 30 weeks gestation. It is excreted in breast milk.
Zyclara Counseling:  I discussed with the patient the risks of imiquimod including but not limited to erythema, scaling, itching, weeping, crusting, and pain.  Patient understands that the inflammatory response to imiquimod is variable from person to person and was educated regarded proper titration schedule.  If flu-like symptoms develop, patient knows to discontinue the medication and contact us.
Sski Pregnancy And Lactation Text: This medication is Pregnancy Category D and isn't considered safe during pregnancy. It is excreted in breast milk.
Acitretin Pregnancy And Lactation Text: This medication is Pregnancy Category X and should not be given to women who are pregnant or may become pregnant in the future. This medication is excreted in breast milk.
Rifampin Counseling: I discussed with the patient the risks of rifampin including but not limited to liver damage, kidney damage, red-orange body fluids, nausea/vomiting and severe allergy.
Thalidomide Counseling: I discussed with the patient the risks of thalidomide including but not limited to birth defects, anxiety, weakness, chest pain, dizziness, cough and severe allergy.
Detail Level: Detailed
Ivermectin Counseling:  Patient instructed to take medication on an empty stomach with a full glass of water.  Patient informed of potential adverse effects including but not limited to nausea, diarrhea, dizziness, itching, and swelling of the extremities or lymph nodes.  The patient verbalized understanding of the proper use and possible adverse effects of ivermectin.  All of the patient's questions and concerns were addressed.
Colchicine Counseling:  Patient counseled regarding adverse effects including but not limited to stomach upset (nausea, vomiting, stomach pain, or diarrhea).  Patient instructed to limit alcohol consumption while taking this medication.  Colchicine may reduce blood counts especially with prolonged use.  The patient understands that monitoring of kidney function and blood counts may be required, especially at baseline. The patient verbalized understanding of the proper use and possible adverse effects of colchicine.  All of the patient's questions and concerns were addressed.
Bactrim Counseling:  I discussed with the patient the risks of sulfa antibiotics including but not limited to GI upset, allergic reaction, drug rash, diarrhea, dizziness, photosensitivity, and yeast infections.  Rarely, more serious reactions can occur including but not limited to aplastic anemia, agranulocytosis, methemoglobinemia, blood dyscrasias, liver or kidney failure, lung infiltrates or desquamative/blistering drug rashes.
Cimzia Counseling:  I discussed with the patient the risks of Cimzia including but not limited to immunosuppression, allergic reactions and infections.  The patient understands that monitoring is required including a PPD at baseline and must alert us or the primary physician if symptoms of infection or other concerning signs are noted.
Stelara Counseling:  I discussed with the patient the risks of ustekinumab including but not limited to immunosuppression, malignancy, posterior leukoencephalopathy syndrome, and serious infections.  The patient understands that monitoring is required including a PPD at baseline and must alert us or the primary physician if symptoms of infection or other concerning signs are noted.
Rituxan Pregnancy And Lactation Text: This medication is Pregnancy Category C and it isn't know if it is safe during pregnancy. It is unknown if this medication is excreted in breast milk but similar antibodies are known to be excreted.
Otezla Counseling: The side effects of Otezla were discussed with the patient, including but not limited to worsening or new depression, weight loss, diarrhea, nausea, upper respiratory tract infection, and headache. Patient instructed to call the office should any adverse effect occur.  The patient verbalized understanding of the proper use and possible adverse effects of Otezla.  All the patient's questions and concerns were addressed.
Hydroxychloroquine Counseling:  I discussed with the patient that a baseline ophthalmologic exam is needed at the start of therapy and every year thereafter while on therapy. A CBC may also be warranted for monitoring.  The side effects of this medication were discussed with the patient, including but not limited to agranulocytosis, aplastic anemia, seizures, rashes, retinopathy, and liver toxicity. Patient instructed to call the office should any adverse effect occur.  The patient verbalized understanding of the proper use and possible adverse effects of Plaquenil.  All the patient's questions and concerns were addressed.
High Dose Vitamin A Counseling: Side effects reviewed, pt to contact office should one occur.
Cyclophosphamide Pregnancy And Lactation Text: This medication is Pregnancy Category D and it isn't considered safe during pregnancy. This medication is excreted in breast milk.
Dapsone Counseling: I discussed with the patient the risks of dapsone including but not limited to hemolytic anemia, agranulocytosis, rashes, methemoglobinemia, kidney failure, peripheral neuropathy, headaches, GI upset, and liver toxicity.  Patients who start dapsone require monitoring including baseline LFTs and weekly CBCs for the first month, then every month thereafter.  The patient verbalized understanding of the proper use and possible adverse effects of dapsone.  All of the patient's questions and concerns were addressed.
Bexarotene Pregnancy And Lactation Text: This medication is Pregnancy Category X and should not be given to women who are pregnant or may become pregnant. This medication should not be used if you are breast feeding.
Arava Counseling:  Patient counseled regarding adverse effects of Arava including but not limited to nausea, vomiting, abnormalities in liver function tests. Patients may develop mouth sores, rash, diarrhea, and abnormalities in blood counts. The patient understands that monitoring is required including LFTs and blood counts.  There is a rare possibility of scarring of the liver and lung problems that can occur when taking methotrexate. Persistent nausea, loss of appetite, pale stools, dark urine, cough, and shortness of breath should be reported immediately. Patient advised to discontinue Arava treatment and consult with a physician prior to attempting conception. The patient will have to undergo a treatment to eliminate Arava from the body prior to conception.
Azithromycin Pregnancy And Lactation Text: This medication is considered safe during pregnancy and is also secreted in breast milk.
Griseofulvin Counseling:  I discussed with the patient the risks of griseofulvin including but not limited to photosensitivity, cytopenia, liver damage, nausea/vomiting and severe allergy.  The patient understands that this medication is best absorbed when taken with a fatty meal (e.g., ice cream or french fries).
Elidel Counseling: Patient may experience a mild burning sensation during topical application. Elidel is not approved in children less than 2 years of age. There have been case reports of hematologic and skin malignancies in patients using topical calcineurin inhibitors although causality is questionable.
Methotrexate Counseling:  Patient counseled regarding adverse effects of methotrexate including but not limited to nausea, vomiting, abnormalities in liver function tests. Patients may develop mouth sores, rash, diarrhea, and abnormalities in blood counts. The patient understands that monitoring is required including LFT's and blood counts.  There is a rare possibility of scarring of the liver and lung problems that can occur when taking methotrexate. Persistent nausea, loss of appetite, pale stools, dark urine, cough, and shortness of breath should be reported immediately. Patient advised to discontinue methotrexate treatment at least three months before attempting to become pregnant.  I discussed the need for folate supplements while taking methotrexate.  These supplements can decrease side effects during methotrexate treatment. The patient verbalized understanding of the proper use and possible adverse effects of methotrexate.  All of the patient's questions and concerns were addressed.

## 2023-03-17 NOTE — PROCEDURE: PRESCRIPTION MEDICATION MANAGEMENT
Detail Level: Zone
Plan: Doing well\\nContinue at 60mg, call with labs & adjust dosing if needed
Render In Strict Bullet Format?: No

## 2023-04-18 ENCOUNTER — APPOINTMENT (OUTPATIENT)
Dept: URBAN - METROPOLITAN AREA CLINIC 258 | Age: 23
Setting detail: DERMATOLOGY
End: 2023-04-18

## 2023-04-18 VITALS — WEIGHT: 150 LBS | HEIGHT: 67 IN

## 2023-04-18 VITALS — WEIGHT: 293 LBS | HEIGHT: 67 IN

## 2023-04-18 DIAGNOSIS — Z79.899 OTHER LONG TERM (CURRENT) DRUG THERAPY: ICD-10-CM

## 2023-04-18 DIAGNOSIS — L70.0 ACNE VULGARIS: ICD-10-CM

## 2023-04-18 PROCEDURE — OTHER PRESCRIPTION: OTHER

## 2023-04-18 PROCEDURE — OTHER HIGH RISK MEDICATION MONITORING: OTHER

## 2023-04-18 PROCEDURE — 99214 OFFICE O/P EST MOD 30 MIN: CPT

## 2023-04-18 PROCEDURE — OTHER COUNSELING: OTHER

## 2023-04-18 PROCEDURE — 81025 URINE PREGNANCY TEST: CPT

## 2023-04-18 PROCEDURE — OTHER OTHER: OTHER

## 2023-04-18 PROCEDURE — OTHER URINE PREGNANCY TEST: OTHER

## 2023-04-18 PROCEDURE — OTHER ISOTRETINOIN MONITORING: OTHER

## 2023-04-18 PROCEDURE — OTHER MIPS QUALITY: OTHER

## 2023-04-18 RX ORDER — ISOTRETINOIN 40 MG/1
CAPSULE, LIQUID FILLED ORAL
Qty: 30 | Refills: 0 | Status: ERX | COMMUNITY
Start: 2023-04-18

## 2023-04-18 RX ORDER — ISOTRETINOIN 30 MG/1
CAPSULE, LIQUID FILLED ORAL
Qty: 30 | Refills: 0 | Status: ERX

## 2023-04-18 NOTE — PROCEDURE: ISOTRETINOIN MONITORING
[Normal Growth] : growth [Healthy Personal Habits] : healthy personal habits [School Readiness] : school readiness [Normal Development] : development [TV/Media] : tv/media [Child and Family Involvement] : child and family involvement [] : The components of the vaccine(s) to be administered today are listed in the plan of care. The disease(s) for which the vaccine(s) are intended to prevent and the risks have been discussed with the caretaker.  The risks are also included in the appropriate vaccination information statements which have been provided to the patient's caregiver.  The caregiver has given consent to vaccinate. [Safety] : safety Lower Range (In Mg/Kg): 120

## 2023-04-18 NOTE — PROCEDURE: OTHER
Note Text (......Xxx Chief Complaint.): This diagnosis correlates with the
Render Risk Assessment In Note?: no
Other (Free Text): Labs stable last month: Pt defers this month. \\nIncrease to 70mg this month & draw labs in 30 days
Detail Level: Zone

## 2023-04-18 NOTE — PROCEDURE: HIGH RISK MEDICATION MONITORING
Picato Counseling:  I discussed with the patient the risks of Picato including but not limited to erythema, scaling, itching, weeping, crusting, and pain.
Methotrexate Counseling:  Patient counseled regarding adverse effects of methotrexate including but not limited to nausea, vomiting, abnormalities in liver function tests. Patients may develop mouth sores, rash, diarrhea, and abnormalities in blood counts. The patient understands that monitoring is required including LFT's and blood counts.  There is a rare possibility of scarring of the liver and lung problems that can occur when taking methotrexate. Persistent nausea, loss of appetite, pale stools, dark urine, cough, and shortness of breath should be reported immediately. Patient advised to discontinue methotrexate treatment at least three months before attempting to become pregnant.  I discussed the need for folate supplements while taking methotrexate.  These supplements can decrease side effects during methotrexate treatment. The patient verbalized understanding of the proper use and possible adverse effects of methotrexate.  All of the patient's questions and concerns were addressed.
Birth Control Pills Pregnancy And Lactation Text: This medication should be avoided if pregnant and for the first 30 days post-partum.
Bexarotene Counseling:  I discussed with the patient the risks of bexarotene including but not limited to hair loss, dry lips/skin/eyes, liver abnormalities, hyperlipidemia, pancreatitis, depression/suicidal ideation, photosensitivity, drug rash/allergic reactions, hypothyroidism, anemia, leukopenia, infection, cataracts, and teratogenicity.  Patient understands that they will need regular blood tests to check lipid profile, liver function tests, white blood cell count, thyroid function tests and pregnancy test if applicable.
Erythromycin Pregnancy And Lactation Text: This medication is Pregnancy Category B and is considered safe during pregnancy. It is also excreted in breast milk.
Simponi Counseling:  I discussed with the patient the risks of golimumab including but not limited to myelosuppression, immunosuppression, autoimmune hepatitis, demyelinating diseases, lymphoma, and serious infections.  The patient understands that monitoring is required including a PPD at baseline and must alert us or the primary physician if symptoms of infection or other concerning signs are noted.
Metronidazole Counseling:  I discussed with the patient the risks of metronidazole including but not limited to seizures, nausea/vomiting, a metallic taste in the mouth, nausea/vomiting and severe allergy.
Odomzo Pregnancy And Lactation Text: This medication is Pregnancy Category X and is absolutely contraindicated during pregnancy. It is unknown if it is excreted in breast milk.
Humira Counseling:  I discussed with the patient the risks of adalimumab including but not limited to myelosuppression, immunosuppression, autoimmune hepatitis, demyelinating diseases, lymphoma, and serious infections.  The patient understands that monitoring is required including a PPD at baseline and must alert us or the primary physician if symptoms of infection or other concerning signs are noted.
Taltz Pregnancy And Lactation Text: The risk during pregnancy and breastfeeding is uncertain with this medication.
Ilumya Counseling: I discussed with the patient the risks of tildrakizumab including but not limited to immunosuppression, malignancy, posterior leukoencephalopathy syndrome, and serious infections.  The patient understands that monitoring is required including a PPD at baseline and must alert us or the primary physician if symptoms of infection or other concerning signs are noted.
Hydroxychloroquine Pregnancy And Lactation Text: This medication has been shown to cause fetal harm but it isn't assigned a Pregnancy Risk Category. There are small amounts excreted in breast milk.
Hydroxychloroquine Counseling:  I discussed with the patient that a baseline ophthalmologic exam is needed at the start of therapy and every year thereafter while on therapy. A CBC may also be warranted for monitoring.  The side effects of this medication were discussed with the patient, including but not limited to agranulocytosis, aplastic anemia, seizures, rashes, retinopathy, and liver toxicity. Patient instructed to call the office should any adverse effect occur.  The patient verbalized understanding of the proper use and possible adverse effects of Plaquenil.  All the patient's questions and concerns were addressed.
Dapsone Counseling: I discussed with the patient the risks of dapsone including but not limited to hemolytic anemia, agranulocytosis, rashes, methemoglobinemia, kidney failure, peripheral neuropathy, headaches, GI upset, and liver toxicity.  Patients who start dapsone require monitoring including baseline LFTs and weekly CBCs for the first month, then every month thereafter.  The patient verbalized understanding of the proper use and possible adverse effects of dapsone.  All of the patient's questions and concerns were addressed.
Azithromycin Pregnancy And Lactation Text: This medication is considered safe during pregnancy and is also secreted in breast milk.
Itraconazole Pregnancy And Lactation Text: This medication is Pregnancy Category C and it isn't know if it is safe during pregnancy. It is also excreted in breast milk.
Cyclosporine Pregnancy And Lactation Text: This medication is Pregnancy Category C and it isn't know if it is safe during pregnancy. This medication is excreted in breast milk.
Xelmerlinz Pregnancy And Lactation Text: This medication is Pregnancy Category D and is not considered safe during pregnancy.  The risk during breast feeding is also uncertain.
Stelara Counseling:  I discussed with the patient the risks of ustekinumab including but not limited to immunosuppression, malignancy, posterior leukoencephalopathy syndrome, and serious infections.  The patient understands that monitoring is required including a PPD at baseline and must alert us or the primary physician if symptoms of infection or other concerning signs are noted.
Oxybutynin Counseling:  I discussed with the patient the risks of oxybutynin including but not limited to skin rash, drowsiness, dry mouth, difficulty urinating, and blurred vision.
Cimetidine Counseling:  I discussed with the patient the risks of Cimetidine including but not limited to gynecomastia, headache, diarrhea, nausea, drowsiness, arrhythmias, pancreatitis, skin rashes, psychosis, bone marrow suppression and kidney toxicity.
Birth Control Pills Counseling: Birth Control Pill Counseling: I discussed with the patient the potential side effects of OCPs including but not limited to increased risk of stroke, heart attack, thrombophlebitis, deep venous thrombosis, hepatic adenomas, breast changes, GI upset, headaches, and depression.  The patient verbalized understanding of the proper use and possible adverse effects of OCPs. All of the patient's questions and concerns were addressed.
Hydroquinone Pregnancy And Lactation Text: This medication has not been assigned a Pregnancy Risk Category but animal studies failed to show danger with the topical medication. It is unknown if the medication is excreted in breast milk.
Carac Counseling:  I discussed with the patient the risks of Carac including but not limited to erythema, scaling, itching, weeping, crusting, and pain.
Drysol Counseling:  I discussed with the patient the risks of drysol/aluminum chloride including but not limited to skin rash, itching, irritation, burning.
Bexarotene Pregnancy And Lactation Text: This medication is Pregnancy Category X and should not be given to women who are pregnant or may become pregnant. This medication should not be used if you are breast feeding.
Tazorac Counseling:  Patient advised that medication is irritating and drying.  Patient may need to apply sparingly and wash off after an hour before eventually leaving it on overnight.  The patient verbalized understanding of the proper use and possible adverse effects of tazorac.  All of the patient's questions and concerns were addressed.
High Dose Vitamin A Pregnancy And Lactation Text: High dose vitamin A therapy is contraindicated during pregnancy and breast feeding.
Arava Counseling:  Patient counseled regarding adverse effects of Arava including but not limited to nausea, vomiting, abnormalities in liver function tests. Patients may develop mouth sores, rash, diarrhea, and abnormalities in blood counts. The patient understands that monitoring is required including LFTs and blood counts.  There is a rare possibility of scarring of the liver and lung problems that can occur when taking methotrexate. Persistent nausea, loss of appetite, pale stools, dark urine, cough, and shortness of breath should be reported immediately. Patient advised to discontinue Arava treatment and consult with a physician prior to attempting conception. The patient will have to undergo a treatment to eliminate Arava from the body prior to conception.
Use Enhanced Medication Counseling?: No
Doxepin Counseling:  Patient advised that the medication is sedating and not to drive a car after taking this medication. Patient informed of potential adverse effects including but not limited to dry mouth, urinary retention, and blurry vision.  The patient verbalized understanding of the proper use and possible adverse effects of doxepin.  All of the patient's questions and concerns were addressed.
Ketoconazole Counseling:   Patient counseled regarding improving absorption with orange juice.  Adverse effects include but are not limited to breast enlargement, headache, diarrhea, nausea, upset stomach, liver function test abnormalities, taste disturbance, and stomach pain.  There is a rare possibility of liver failure that can occur when taking ketoconazole. The patient understands that monitoring of LFTs may be required, especially at baseline. The patient verbalized understanding of the proper use and possible adverse effects of ketoconazole.  All of the patient's questions and concerns were addressed.
Cimzia Counseling:  I discussed with the patient the risks of Cimzia including but not limited to immunosuppression, allergic reactions and infections.  The patient understands that monitoring is required including a PPD at baseline and must alert us or the primary physician if symptoms of infection or other concerning signs are noted.
Azathioprine Pregnancy And Lactation Text: This medication is Pregnancy Category D and isn't considered safe during pregnancy. It is unknown if this medication is excreted in breast milk.
Topical Clindamycin Pregnancy And Lactation Text: This medication is Pregnancy Category B and is considered safe during pregnancy. It is unknown if it is excreted in breast milk.
Otezla Pregnancy And Lactation Text: This medication is Pregnancy Category C and it isn't known if it is safe during pregnancy. It is unknown if it is excreted in breast milk.
Nsaids Pregnancy And Lactation Text: These medications are considered safe up to 30 weeks gestation. It is excreted in breast milk.
Colchicine Pregnancy And Lactation Text: This medication is Pregnancy Category C and isn't considered safe during pregnancy. It is excreted in breast milk.
Cosentyx Counseling:  I discussed with the patient the risks of Cosentyx including but not limited to worsening of Crohn's disease, immunosuppression, allergic reactions and infections.  The patient understands that monitoring is required including a PPD at baseline and must alert us or the primary physician if symptoms of infection or other concerning signs are noted.
5-Fu Counseling: 5-Fluorouracil Counseling:  I discussed with the patient the risks of 5-fluorouracil including but not limited to erythema, scaling, itching, weeping, crusting, and pain.
Rituxan Pregnancy And Lactation Text: This medication is Pregnancy Category C and it isn't know if it is safe during pregnancy. It is unknown if this medication is excreted in breast milk but similar antibodies are known to be excreted.
Tazorac Pregnancy And Lactation Text: This medication is not safe during pregnancy. It is unknown if this medication is excreted in breast milk.
Methotrexate Pregnancy And Lactation Text: This medication is Pregnancy Category X and is known to cause fetal harm. This medication is excreted in breast milk.
Stelara Pregnancy And Lactation Text: This medication is Pregnancy Category B and is considered safe during pregnancy. It is unknown if this medication is excreted in breast milk.
Dapsone Pregnancy And Lactation Text: This medication is Pregnancy Category C and is not considered safe during pregnancy or breast feeding.
Picato Pregnancy And Lactation Text: This medication is Pregnancy Category C. It is unknown if this medication is excreted in breast milk.
Hydroxyzine Counseling: Patient advised that the medication is sedating and not to drive a car after taking this medication.  Patient informed of potential adverse effects including but not limited to dry mouth, urinary retention, and blurry vision.  The patient verbalized understanding of the proper use and possible adverse effects of hydroxyzine.  All of the patient's questions and concerns were addressed.
Cellcept Counseling:  I discussed with the patient the risks of mycophenolate mofetil including but not limited to infection/immunosuppression, GI upset, hypokalemia, hypercholesterolemia, bone marrow suppression, lymphoproliferative disorders, malignancy, GI ulceration/bleed/perforation, colitis, interstitial lung disease, kidney failure, progressive multifocal leukoencephalopathy, and birth defects.  The patient understands that monitoring is required including a baseline creatinine and regular CBC testing. In addition, patient must alert us immediately if symptoms of infection or other concerning signs are noted.
Topical Retinoid counseling:  Patient advised to apply a pea-sized amount only at bedtime and wait 30 minutes after washing their face before applying.  If too drying, patient may add a non-comedogenic moisturizer. The patient verbalized understanding of the proper use and possible adverse effects of retinoids.  All of the patient's questions and concerns were addressed.
Terbinafine Pregnancy And Lactation Text: This medication is Pregnancy Category B and is considered safe during pregnancy. It is also excreted in breast milk and breast feeding isn't recommended.
Minocycline Counseling: Patient advised regarding possible photosensitivity and discoloration of the teeth, skin, lips, tongue and gums.  Patient instructed to avoid sunlight, if possible.  When exposed to sunlight, patients should wear protective clothing, sunglasses, and sunscreen.  The patient was instructed to call the office immediately if the following severe adverse effects occur:  hearing changes, easy bruising/bleeding, severe headache, or vision changes.  The patient verbalized understanding of the proper use and possible adverse effects of minocycline.  All of the patient's questions and concerns were addressed.
Bactrim Counseling:  I discussed with the patient the risks of sulfa antibiotics including but not limited to GI upset, allergic reaction, drug rash, diarrhea, dizziness, photosensitivity, and yeast infections.  Rarely, more serious reactions can occur including but not limited to aplastic anemia, agranulocytosis, methemoglobinemia, blood dyscrasias, liver or kidney failure, lung infiltrates or desquamative/blistering drug rashes.
Tetracycline Counseling: Patient counseled regarding possible photosensitivity and increased risk for sunburn.  Patient instructed to avoid sunlight, if possible.  When exposed to sunlight, patients should wear protective clothing, sunglasses, and sunscreen.  The patient was instructed to call the office immediately if the following severe adverse effects occur:  hearing changes, easy bruising/bleeding, severe headache, or vision changes.  The patient verbalized understanding of the proper use and possible adverse effects of tetracycline.  All of the patient's questions and concerns were addressed. Patient understands to avoid pregnancy while on therapy due to potential birth defects.
Tetracycline Pregnancy And Lactation Text: This medication is Pregnancy Category D and not consider safe during pregnancy. It is also excreted in breast milk.
Sarecycline Counseling: Patient advised regarding possible photosensitivity and discoloration of the teeth, skin, lips, tongue and gums.  Patient instructed to avoid sunlight, if possible.  When exposed to sunlight, patients should wear protective clothing, sunglasses, and sunscreen.  The patient was instructed to call the office immediately if the following severe adverse effects occur:  hearing changes, easy bruising/bleeding, severe headache, or vision changes.  The patient verbalized understanding of the proper use and possible adverse effects of sarecycline.  All of the patient's questions and concerns were addressed.
Protopic Counseling: Patient may experience a mild burning sensation during topical application. Protopic is not approved in children less than 2 years of age. There have been case reports of hematologic and skin malignancies in patients using topical calcineurin inhibitors although causality is questionable.
Thalidomide Counseling: I discussed with the patient the risks of thalidomide including but not limited to birth defects, anxiety, weakness, chest pain, dizziness, cough and severe allergy.
Erivedge Counseling- I discussed with the patient the risks of Erivedge including but not limited to nausea, vomiting, diarrhea, constipation, weight loss, changes in the sense of taste, decreased appetite, muscle spasms, and hair loss.  The patient verbalized understanding of the proper use and possible adverse effects of Erivedge.  All of the patient's questions and concerns were addressed.
Rifampin Counseling: I discussed with the patient the risks of rifampin including but not limited to liver damage, kidney damage, red-orange body fluids, nausea/vomiting and severe allergy.
Spironolactone Pregnancy And Lactation Text: This medication can cause feminization of the male fetus and should be avoided during pregnancy. The active metabolite is also found in breast milk.
Benzoyl Peroxide Pregnancy And Lactation Text: This medication is Pregnancy Category C. It is unknown if benzoyl peroxide is excreted in breast milk.
Bactrim Pregnancy And Lactation Text: This medication is Pregnancy Category D and is known to cause fetal risk.  It is also excreted in breast milk.
Solaraze Pregnancy And Lactation Text: This medication is Pregnancy Category B and is considered safe. There is some data to suggest avoiding during the third trimester. It is unknown if this medication is excreted in breast milk.
Quinolones Counseling:  I discussed with the patient the risks of fluoroquinolones including but not limited to GI upset, allergic reaction, drug rash, diarrhea, dizziness, photosensitivity, yeast infections, liver function test abnormalities, tendonitis/tendon rupture.
Detail Level: Detailed
Minoxidil Counseling: Minoxidil is a topical medication which can increase blood flow where it is applied. It is uncertain how this medication increases hair growth. Side effects are uncommon and include stinging and allergic reactions.
Isotretinoin Pregnancy And Lactation Text: This medication is Pregnancy Category X and is considered extremely dangerous during pregnancy. It is unknown if it is excreted in breast milk.
Hydroxyzine Pregnancy And Lactation Text: This medication is not safe during pregnancy and should not be taken. It is also excreted in breast milk and breast feeding isn't recommended.
Valtrex Pregnancy And Lactation Text: this medication is Pregnancy Category B and is considered safe during pregnancy. This medication is not directly found in breast milk but it's metabolite acyclovir is present.
Doxycycline Counseling:  Patient counseled regarding possible photosensitivity and increased risk for sunburn.  Patient instructed to avoid sunlight, if possible.  When exposed to sunlight, patients should wear protective clothing, sunglasses, and sunscreen.  The patient was instructed to call the office immediately if the following severe adverse effects occur:  hearing changes, easy bruising/bleeding, severe headache, or vision changes.  The patient verbalized understanding of the proper use and possible adverse effects of doxycycline.  All of the patient's questions and concerns were addressed.
Topical Sulfur Applications Counseling: Topical Sulfur Counseling: Patient counseled that this medication may cause skin irritation or allergic reactions.  In the event of skin irritation, the patient was advised to reduce the amount of the drug applied or use it less frequently.   The patient verbalized understanding of the proper use and possible adverse effects of topical sulfur application.  All of the patient's questions and concerns were addressed.
Eucrisa Counseling: Patient may experience a mild burning sensation during topical application. Eucrisa is not approved in children less than 2 years of age.
Albendazole Counseling:  I discussed with the patient the risks of albendazole including but not limited to cytopenia, kidney damage, nausea/vomiting and severe allergy.  The patient understands that this medication is being used in an off-label manner.
Cephalexin Pregnancy And Lactation Text: This medication is Pregnancy Category B and considered safe during pregnancy.  It is also excreted in breast milk but can be used safely for shorter doses.
Glycopyrrolate Counseling:  I discussed with the patient the risks of glycopyrrolate including but not limited to skin rash, drowsiness, dry mouth, difficulty urinating, and blurred vision.
Isotretinoin Counseling: Patient should get monthly blood tests, not donate blood, not drive at night if vision affected, not share medication, and not undergo elective surgery for 6 months after tx completed. Side effects reviewed, pt to contact office should one occur.
Xolair Pregnancy And Lactation Text: This medication is Pregnancy Category B and is considered safe during pregnancy. This medication is excreted in breast milk.
Dupixent Counseling: I discussed with the patient the risks of dupilumab including but not limited to eye infection and irritation, cold sores, injection site reactions, worsening of asthma, allergic reactions and increased risk of parasitic infection.  Live vaccines should be avoided while taking dupilumab. Dupilumab will also interact with certain medications such as warfarin and cyclosporine. The patient understands that monitoring is required and they must alert us or the primary physician if symptoms of infection or other concerning signs are noted.
Benzoyl Peroxide Counseling: Patient counseled that medicine may cause skin irritation and bleach clothing.  In the event of skin irritation, the patient was advised to reduce the amount of the drug applied or use it less frequently.   The patient verbalized understanding of the proper use and possible adverse effects of benzoyl peroxide.  All of the patient's questions and concerns were addressed.
Drysol Pregnancy And Lactation Text: This medication is considered safe during pregnancy and breast feeding.
Clindamycin Pregnancy And Lactation Text: This medication can be used in pregnancy if certain situations. Clindamycin is also present in breast milk.
Ketoconazole Pregnancy And Lactation Text: This medication is Pregnancy Category C and it isn't know if it is safe during pregnancy. It is also excreted in breast milk and breast feeding isn't recommended.
Imiquimod Counseling:  I discussed with the patient the risks of imiquimod including but not limited to erythema, scaling, itching, weeping, crusting, and pain.  Patient understands that the inflammatory response to imiquimod is variable from person to person and was educated regarded proper titration schedule.  If flu-like symptoms develop, patient knows to discontinue the medication and contact us.
Gabapentin Counseling: I discussed with the patient the risks of gabapentin including but not limited to dizziness, somnolence, fatigue and ataxia.
Glycopyrrolate Pregnancy And Lactation Text: This medication is Pregnancy Category B and is considered safe during pregnancy. It is unknown if it is excreted breast milk.
Xolair Counseling:  Patient informed of potential adverse effects including but not limited to fever, muscle aches, rash and allergic reactions.  The patient verbalized understanding of the proper use and possible adverse effects of Xolair.  All of the patient's questions and concerns were addressed.
Enbrel Counseling:  I discussed with the patient the risks of etanercept including but not limited to myelosuppression, immunosuppression, autoimmune hepatitis, demyelinating diseases, lymphoma, and infections.  The patient understands that monitoring is required including a PPD at baseline and must alert us or the primary physician if symptoms of infection or other concerning signs are noted.
Infliximab Counseling:  I discussed with the patient the risks of infliximab including but not limited to myelosuppression, immunosuppression, autoimmune hepatitis, demyelinating diseases, lymphoma, and serious infections.  The patient understands that monitoring is required including a PPD at baseline and must alert us or the primary physician if symptoms of infection or other concerning signs are noted.
Acitretin Pregnancy And Lactation Text: This medication is Pregnancy Category X and should not be given to women who are pregnant or may become pregnant in the future. This medication is excreted in breast milk.
Protopic Pregnancy And Lactation Text: This medication is Pregnancy Category C. It is unknown if this medication is excreted in breast milk when applied topically.
Topical Clindamycin Counseling: Patient counseled that this medication may cause skin irritation or allergic reactions.  In the event of skin irritation, the patient was advised to reduce the amount of the drug applied or use it less frequently.   The patient verbalized understanding of the proper use and possible adverse effects of clindamycin.  All of the patient's questions and concerns were addressed.
Cimzia Pregnancy And Lactation Text: This medication crosses the placenta but can be considered safe in certain situations. Cimzia may be excreted in breast milk.
Hydroquinone Counseling:  Patient advised that medication may result in skin irritation, lightening (hypopigmentation), dryness, and burning.  In the event of skin irritation, the patient was advised to reduce the amount of the drug applied or use it less frequently.  Rarely, spots that are treated with hydroquinone can become darker (pseudoochronosis).  Should this occur, patient instructed to stop medication and call the office. The patient verbalized understanding of the proper use and possible adverse effects of hydroquinone.  All of the patient's questions and concerns were addressed.
Dupixent Pregnancy And Lactation Text: This medication likely crosses the placenta but the risk for the fetus is uncertain. This medication is excreted in breast milk.
Griseofulvin Counseling:  I discussed with the patient the risks of griseofulvin including but not limited to photosensitivity, cytopenia, liver damage, nausea/vomiting and severe allergy.  The patient understands that this medication is best absorbed when taken with a fatty meal (e.g., ice cream or french fries).
Elidel Counseling: Patient may experience a mild burning sensation during topical application. Elidel is not approved in children less than 2 years of age. There have been case reports of hematologic and skin malignancies in patients using topical calcineurin inhibitors although causality is questionable.
Ivermectin Pregnancy And Lactation Text: This medication is Pregnancy Category C and it isn't known if it is safe during pregnancy. It is also excreted in breast milk.
Azathioprine Counseling:  I discussed with the patient the risks of azathioprine including but not limited to myelosuppression, immunosuppression, hepatotoxicity, lymphoma, and infections.  The patient understands that monitoring is required including baseline LFTs, Creatinine, possible TPMP genotyping and weekly CBCs for the first month and then every 2 weeks thereafter.  The patient verbalized understanding of the proper use and possible adverse effects of azathioprine.  All of the patient's questions and concerns were addressed.
Valtrex Counseling: I discussed with the patient the risks of valacyclovir including but not limited to kidney damage, nausea, vomiting and severe allergy.  The patient understands that if the infection seems to be worsening or is not improving, they are to call.
SSKI Counseling:  I discussed with the patient the risks of SSKI including but not limited to thyroid abnormalities, metallic taste, GI upset, fever, headache, acne, arthralgias, paraesthesias, lymphadenopathy, easy bleeding, arrhythmias, and allergic reaction.
Solaraze Counseling:  I discussed with the patient the risks of Solaraze including but not limited to erythema, scaling, itching, weeping, crusting, and pain.
Metronidazole Pregnancy And Lactation Text: This medication is Pregnancy Category B and considered safe during pregnancy.  It is also excreted in breast milk.
High Dose Vitamin A Counseling: Side effects reviewed, pt to contact office should one occur.
5-Fu Pregnancy And Lactation Text: This medication is Pregnancy Category X and contraindicated in pregnancy and in women who may become pregnant. It is unknown if this medication is excreted in breast milk.
Clindamycin Counseling: I counseled the patient regarding use of clindamycin as an antibiotic for prophylactic and/or therapeutic purposes. Clindamycin is active against numerous classes of bacteria, including skin bacteria. Side effects may include nausea, diarrhea, gastrointestinal upset, rash, hives, yeast infections, and in rare cases, colitis.
Topical Sulfur Applications Pregnancy And Lactation Text: This medication is Pregnancy Category C and has an unknown safety profile during pregnancy. It is unknown if this topical medication is excreted in breast milk.
Otezla Counseling: The side effects of Otezla were discussed with the patient, including but not limited to worsening or new depression, weight loss, diarrhea, nausea, upper respiratory tract infection, and headache. Patient instructed to call the office should any adverse effect occur.  The patient verbalized understanding of the proper use and possible adverse effects of Otezla.  All the patient's questions and concerns were addressed.
Siliq Counseling:  I discussed with the patient the risks of Siliq including but not limited to new or worsening depression, suicidal thoughts and behavior, immunosuppression, malignancy, posterior leukoencephalopathy syndrome, and serious infections.  The patient understands that monitoring is required including a PPD at baseline and must alert us or the primary physician if symptoms of infection or other concerning signs are noted. There is also a special program designed to monitor depression which is required with Siliq.
Azithromycin Counseling:  I discussed with the patient the risks of azithromycin including but not limited to GI upset, allergic reaction, drug rash, diarrhea, and yeast infections.
Sski Pregnancy And Lactation Text: This medication is Pregnancy Category D and isn't considered safe during pregnancy. It is excreted in breast milk.
Spironolactone Counseling: Patient advised regarding risks of diarrhea, abdominal pain, hyperkalemia, birth defects (for female patients), liver toxicity and renal toxicity. The patient may need blood work to monitor liver and kidney function and potassium levels while on therapy. The patient verbalized understanding of the proper use and possible adverse effects of spironolactone.  All of the patient's questions and concerns were addressed.
Xeljanz Counseling: I discussed with the patient the risks of Xeljanz therapy including increased risk of infection, liver issues, headache, diarrhea, or cold symptoms. Live vaccines should be avoided. They were instructed to call if they have any problems.
Cyclophosphamide Pregnancy And Lactation Text: This medication is Pregnancy Category D and it isn't considered safe during pregnancy. This medication is excreted in breast milk.
Colchicine Counseling:  Patient counseled regarding adverse effects including but not limited to stomach upset (nausea, vomiting, stomach pain, or diarrhea).  Patient instructed to limit alcohol consumption while taking this medication.  Colchicine may reduce blood counts especially with prolonged use.  The patient understands that monitoring of kidney function and blood counts may be required, especially at baseline. The patient verbalized understanding of the proper use and possible adverse effects of colchicine.  All of the patient's questions and concerns were addressed.
Tremfya Counseling: I discussed with the patient the risks of guselkumab including but not limited to immunosuppression, serious infections, and drug reactions.  The patient understands that monitoring is required including a PPD at baseline and must alert us or the primary physician if symptoms of infection or other concerning signs are noted.
Doxepin Pregnancy And Lactation Text: This medication is Pregnancy Category C and it isn't known if it is safe during pregnancy. It is also excreted in breast milk and breast feeding isn't recommended.
Cyclophosphamide Counseling:  I discussed with the patient the risks of cyclophosphamide including but not limited to hair loss, hormonal abnormalities, decreased fertility, abdominal pain, diarrhea, nausea and vomiting, bone marrow suppression and infection. The patient understands that monitoring is required while taking this medication.
Skyrizi Counseling: I discussed with the patient the risks of risankizumab-rzaa including but not limited to immunosuppression, and serious infections.  The patient understands that monitoring is required including a PPD at baseline and must alert us or the primary physician if symptoms of infection or other concerning signs are noted.
Itraconazole Counseling:  I discussed with the patient the risks of itraconazole including but not limited to liver damage, nausea/vomiting, neuropathy, and severe allergy.  The patient understands that this medication is best absorbed when taken with acidic beverages such as non-diet cola or ginger ale.  The patient understands that monitoring is required including baseline LFTs and repeat LFTs at intervals.  The patient understands that they are to contact us or the primary physician if concerning signs are noted.
Doxycycline Pregnancy And Lactation Text: This medication is Pregnancy Category D and not consider safe during pregnancy. It is also excreted in breast milk but is considered safe for shorter treatment courses.
Prednisone Counseling:  I discussed with the patient the risks of prolonged use of prednisone including but not limited to weight gain, insomnia, osteoporosis, mood changes, diabetes, susceptibility to infection, glaucoma and high blood pressure.  In cases where prednisone use is prolonged, patients should be monitored with blood pressure checks, serum glucose levels and an eye exam.  Additionally, the patient may need to be placed on GI prophylaxis, PCP prophylaxis, and calcium and vitamin D supplementation and/or a bisphosphonate.  The patient verbalized understanding of the proper use and the possible adverse effects of prednisone.  All of the patient's questions and concerns were addressed.
Odomzo Counseling- I discussed with the patient the risks of Odomzo including but not limited to nausea, vomiting, diarrhea, constipation, weight loss, changes in the sense of taste, decreased appetite, muscle spasms, and hair loss.  The patient verbalized understanding of the proper use and possible adverse effects of Odomzo.  All of the patient's questions and concerns were addressed.
Fluconazole Counseling:  Patient counseled regarding adverse effects of fluconazole including but not limited to headache, diarrhea, nausea, upset stomach, liver function test abnormalities, taste disturbance, and stomach pain.  There is a rare possibility of liver failure that can occur when taking fluconazole.  The patient understands that monitoring of LFTs and kidney function test may be required, especially at baseline. The patient verbalized understanding of the proper use and possible adverse effects of fluconazole.  All of the patient's questions and concerns were addressed.
Rifampin Pregnancy And Lactation Text: This medication is Pregnancy Category C and it isn't know if it is safe during pregnancy. It is also excreted in breast milk and should not be used if you are breast feeding.
Terbinafine Counseling: Patient counseling regarding adverse effects of terbinafine including but not limited to headache, diarrhea, rash, upset stomach, liver function test abnormalities, itching, taste/smell disturbance, nausea, abdominal pain, and flatulence.  There is a rare possibility of liver failure that can occur when taking terbinafine.  The patient understands that a baseline LFT and kidney function test may be required. The patient verbalized understanding of the proper use and possible adverse effects of terbinafine.  All of the patient's questions and concerns were addressed.
Nsaids Counseling: NSAID Counseling: I discussed with the patient that NSAIDs should be taken with food. Prolonged use of NSAIDs can result in the development of stomach ulcers.  Patient advised to stop taking NSAIDs if abdominal pain occurs.  The patient verbalized understanding of the proper use and possible adverse effects of NSAIDs.  All of the patient's questions and concerns were addressed.
Ivermectin Counseling:  Patient instructed to take medication on an empty stomach with a full glass of water.  Patient informed of potential adverse effects including but not limited to nausea, diarrhea, dizziness, itching, and swelling of the extremities or lymph nodes.  The patient verbalized understanding of the proper use and possible adverse effects of ivermectin.  All of the patient's questions and concerns were addressed.
Erythromycin Counseling:  I discussed with the patient the risks of erythromycin including but not limited to GI upset, allergic reaction, drug rash, diarrhea, increase in liver enzymes, and yeast infections.
Taltz Counseling: I discussed with the patient the risks of ixekizumab including but not limited to immunosuppression, serious infections, worsening of inflammatory bowel disease and drug reactions.  The patient understands that monitoring is required including a PPD at baseline and must alert us or the primary physician if symptoms of infection or other concerning signs are noted.
Cephalexin Counseling: I counseled the patient regarding use of cephalexin as an antibiotic for prophylactic and/or therapeutic purposes. Cephalexin (commonly prescribed under brand name Keflex) is a cephalosporin antibiotic which is active against numerous classes of bacteria, including most skin bacteria. Side effects may include nausea, diarrhea, gastrointestinal upset, rash, hives, yeast infections, and in rare cases, hepatitis, kidney disease, seizures, fever, confusion, neurologic symptoms, and others. Patients with severe allergies to penicillin medications are cautioned that there is about a 10% incidence of cross-reactivity with cephalosporins. When possible, patients with penicillin allergies should use alternatives to cephalosporins for antibiotic therapy.
Cyclosporine Counseling:  I discussed with the patient the risks of cyclosporine including but not limited to hypertension, gingival hyperplasia,myelosuppression, immunosuppression, liver damage, kidney damage, neurotoxicity, lymphoma, and serious infections. The patient understands that monitoring is required including baseline blood pressure, CBC, CMP, lipid panel and uric acid, and then 1-2 times monthly CMP and blood pressure.
Rituxan Counseling:  I discussed with the patient the risks of Rituxan infusions. Side effects can include infusion reactions, severe drug rashes including mucocutaneous reactions, reactivation of latent hepatitis and other infections and rarely progressive multifocal leukoencephalopathy.  All of the patient's questions and concerns were addressed.
Clofazimine Counseling:  I discussed with the patient the risks of clofazimine including but not limited to skin and eye pigmentation, liver damage, nausea/vomiting, gastrointestinal bleeding and allergy.
Acitretin Counseling:  I discussed with the patient the risks of acitretin including but not limited to hair loss, dry lips/skin/eyes, liver damage, hyperlipidemia, depression/suicidal ideation, photosensitivity.  Serious rare side effects can include but are not limited to pancreatitis, pseudotumor cerebri, bony changes, clot formation/stroke/heart attack.  Patient understands that alcohol is contraindicated since it can result in liver toxicity and significantly prolong the elimination of the drug by many years.
Zyclara Counseling:  I discussed with the patient the risks of imiquimod including but not limited to erythema, scaling, itching, weeping, crusting, and pain.  Patient understands that the inflammatory response to imiquimod is variable from person to person and was educated regarded proper titration schedule.  If flu-like symptoms develop, patient knows to discontinue the medication and contact us.
Griseofulvin Pregnancy And Lactation Text: This medication is Pregnancy Category X and is known to cause serious birth defects. It is unknown if this medication is excreted in breast milk but breast feeding should be avoided.

## 2023-04-26 ENCOUNTER — OFFICE VISIT (OUTPATIENT)
Dept: ORTHOPEDICS | Facility: CLINIC | Age: 23
End: 2023-04-26
Payer: COMMERCIAL

## 2023-04-26 DIAGNOSIS — Z98.890 S/P KNEE SURGERY: Primary | ICD-10-CM

## 2023-04-26 RX ORDER — ISOTRETINOIN 20 MG/1
35 CAPSULE ORAL 2 TIMES DAILY
COMMUNITY

## 2023-04-26 NOTE — LETTER
4/26/2023      RE: Alessandra Mccormick  [address] [facility] [address]     Dear Colleague,    Thank you for referring your patient, Alessandra Mccormick, to the [facility]. Please see a copy of my visit note below.    Chief Complaint:  Postoperative visit, right knee    Date of Surgery:  12/23/2/2022    History of Present Illness:  Alessandra is a 22-year-old  who is now 4 months status post right knee arthroscopy, chondroplasty, and debridement.  She is doing very well.  She ranks her knee is a 100.  She has no pain.  She has no swelling.  She is able to run.  Her knee feels stable.    She tells me that she is done playing competitive soccer.  She may  at some point in the future.  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    Physical Examination:  22-year-old female alert oriented no apparent distress.  Range of motion 2/0/140 which is symmetrical.  Negative Lachman.  Negative pivot shift.  No effusion.  No patellofemoral crepitation.    Impression:  4 months status post right knee arthroscopy, chondroplasty, debridement.  Doing very well.  We had a nice conversation about signs and symptoms of concern.  We discussed appropriate activity and the importance of strength training and low impact conditioning.    Plan:  1. She will continue strength training and low impact conditioning program  2. She will follow-up with me at 1 year postop for routine recheck.        Again, thank you for allowing me to participate in the care of your patient.      Sincerely,    Jean Paul Nieto MD

## 2023-04-30 NOTE — PROGRESS NOTES
Chief Complaint:  Postoperative visit, right knee    Date of Surgery:  12/23/2/2022    History of Present Illness:  Alessandra is a 22-year-old  who is now 4 months status post right knee arthroscopy, chondroplasty, and debridement.  She is doing very well.  She ranks her knee is a 100.  She has no pain.  She has no swelling.  She is able to run.  Her knee feels stable.    She tells me that she is done playing competitive soccer.  She may  at some point in the future.  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    Physical Examination:  22-year-old female alert oriented no apparent distress.  Range of motion 2/0/140 which is symmetrical.  Negative Lachman.  Negative pivot shift.  No effusion.  No patellofemoral crepitation.    Impression:  4 months status post right knee arthroscopy, chondroplasty, debridement.  Doing very well.  We had a nice conversation about signs and symptoms of concern.  We discussed appropriate activity and the importance of strength training and low impact conditioning.    Plan:  1. She will continue strength training and low impact conditioning program  2. She will follow-up with me at 1 year postop for routine recheck.

## 2023-05-23 ENCOUNTER — APPOINTMENT (OUTPATIENT)
Dept: URBAN - METROPOLITAN AREA CLINIC 258 | Age: 23
Setting detail: DERMATOLOGY
End: 2023-05-23

## 2023-05-23 DIAGNOSIS — Z79.899 OTHER LONG TERM (CURRENT) DRUG THERAPY: ICD-10-CM

## 2023-05-23 DIAGNOSIS — L70.0 ACNE VULGARIS: ICD-10-CM

## 2023-05-23 PROCEDURE — OTHER PRESCRIPTION: OTHER

## 2023-05-23 PROCEDURE — OTHER ISOTRETINOIN MONITORING: OTHER

## 2023-05-23 PROCEDURE — OTHER MIPS QUALITY: OTHER

## 2023-05-23 PROCEDURE — OTHER COUNSELING: OTHER

## 2023-05-23 PROCEDURE — 36415 COLL VENOUS BLD VENIPUNCTURE: CPT

## 2023-05-23 PROCEDURE — 81025 URINE PREGNANCY TEST: CPT

## 2023-05-23 PROCEDURE — OTHER VENIPUNCTURE: OTHER

## 2023-05-23 PROCEDURE — OTHER ORDER TESTS: OTHER

## 2023-05-23 PROCEDURE — OTHER HIGH RISK MEDICATION MONITORING: OTHER

## 2023-05-23 PROCEDURE — OTHER OTHER: OTHER

## 2023-05-23 PROCEDURE — OTHER URINE PREGNANCY TEST: OTHER

## 2023-05-23 PROCEDURE — 99214 OFFICE O/P EST MOD 30 MIN: CPT

## 2023-05-23 RX ORDER — ISOTRETINOIN 40 MG/1
40MG CAPSULE, LIQUID FILLED ORAL QD
Qty: 30 | Refills: 0 | Status: ERX

## 2023-05-23 RX ORDER — ISOTRETINOIN 30 MG/1
30MG CAPSULE, LIQUID FILLED ORAL
Qty: 30 | Refills: 0 | Status: ERX

## 2023-05-23 ASSESSMENT — LOCATION DETAILED DESCRIPTION DERM: LOCATION DETAILED: LEFT ANTECUBITAL SKIN

## 2023-05-23 ASSESSMENT — LOCATION SIMPLE DESCRIPTION DERM: LOCATION SIMPLE: LEFT UPPER ARM

## 2023-05-23 ASSESSMENT — LOCATION ZONE DERM: LOCATION ZONE: ARM

## 2023-05-23 NOTE — HPI: MEDICATION (ISOTRETINOIN)
How Severe Is It?: mild
Is This A New Presentation, Or A Follow-Up?: Follow Up Isotretinoin
Additional History: Patient reports having one or two breakouts this month. However, they did clear quickly. She also endorses bring dryness and a few nosebleeds.

## 2023-05-23 NOTE — PROCEDURE: OTHER
Detail Level: Zone
Other (Free Text): Labs drawn today due to last months dosage change \\nContinue 70mg daily this month\\nFollow up in 30-37 days.
Render Risk Assessment In Note?: no
Note Text (......Xxx Chief Complaint.): This diagnosis correlates with the

## 2023-05-23 NOTE — PROCEDURE: HIGH RISK MEDICATION MONITORING
Xolair Counseling:  Patient informed of potential adverse effects including but not limited to fever, muscle aches, rash and allergic reactions.  The patient verbalized understanding of the proper use and possible adverse effects of Xolair.  All of the patient's questions and concerns were addressed.
Protopic Counseling: Patient may experience a mild burning sensation during topical application. Protopic is not approved in children less than 2 years of age. There have been case reports of hematologic and skin malignancies in patients using topical calcineurin inhibitors although causality is questionable.
Picato Pregnancy And Lactation Text: This medication is Pregnancy Category C. It is unknown if this medication is excreted in breast milk.
Otezla Pregnancy And Lactation Text: This medication is Pregnancy Category C and it isn't known if it is safe during pregnancy. It is unknown if it is excreted in breast milk.
Minocycline Pregnancy And Lactation Text: This medication is Pregnancy Category D and not consider safe during pregnancy. It is also excreted in breast milk.
Dupixent Pregnancy And Lactation Text: This medication likely crosses the placenta but the risk for the fetus is uncertain. This medication is excreted in breast milk.
Erivedge Counseling- I discussed with the patient the risks of Erivedge including but not limited to nausea, vomiting, diarrhea, constipation, weight loss, changes in the sense of taste, decreased appetite, muscle spasms, and hair loss.  The patient verbalized understanding of the proper use and possible adverse effects of Erivedge.  All of the patient's questions and concerns were addressed.
Metronidazole Pregnancy And Lactation Text: This medication is Pregnancy Category B and considered safe during pregnancy.  It is also excreted in breast milk.
Rituxan Counseling:  I discussed with the patient the risks of Rituxan infusions. Side effects can include infusion reactions, severe drug rashes including mucocutaneous reactions, reactivation of latent hepatitis and other infections and rarely progressive multifocal leukoencephalopathy.  All of the patient's questions and concerns were addressed.
Hydroquinone Pregnancy And Lactation Text: This medication has not been assigned a Pregnancy Risk Category but animal studies failed to show danger with the topical medication. It is unknown if the medication is excreted in breast milk.
Isotretinoin Counseling: Patient should get monthly blood tests, not donate blood, not drive at night if vision affected, not share medication, and not undergo elective surgery for 6 months after tx completed. Side effects reviewed, pt to contact office should one occur.
Clofazimine Counseling:  I discussed with the patient the risks of clofazimine including but not limited to skin and eye pigmentation, liver damage, nausea/vomiting, gastrointestinal bleeding and allergy.
Simponi Counseling:  I discussed with the patient the risks of golimumab including but not limited to myelosuppression, immunosuppression, autoimmune hepatitis, demyelinating diseases, lymphoma, and serious infections.  The patient understands that monitoring is required including a PPD at baseline and must alert us or the primary physician if symptoms of infection or other concerning signs are noted.
Solaraze Counseling:  I discussed with the patient the risks of Solaraze including but not limited to erythema, scaling, itching, weeping, crusting, and pain.
Xolair Pregnancy And Lactation Text: This medication is Pregnancy Category B and is considered safe during pregnancy. This medication is excreted in breast milk.
Topical Clindamycin Pregnancy And Lactation Text: This medication is Pregnancy Category B and is considered safe during pregnancy. It is unknown if it is excreted in breast milk.
Elidel Counseling: Patient may experience a mild burning sensation during topical application. Elidel is not approved in children less than 2 years of age. There have been case reports of hematologic and skin malignancies in patients using topical calcineurin inhibitors although causality is questionable.
Topical Clindamycin Counseling: Patient counseled that this medication may cause skin irritation or allergic reactions.  In the event of skin irritation, the patient was advised to reduce the amount of the drug applied or use it less frequently.   The patient verbalized understanding of the proper use and possible adverse effects of clindamycin.  All of the patient's questions and concerns were addressed.
Azithromycin Counseling:  I discussed with the patient the risks of azithromycin including but not limited to GI upset, allergic reaction, drug rash, diarrhea, and yeast infections.
Arava Counseling:  Patient counseled regarding adverse effects of Arava including but not limited to nausea, vomiting, abnormalities in liver function tests. Patients may develop mouth sores, rash, diarrhea, and abnormalities in blood counts. The patient understands that monitoring is required including LFTs and blood counts.  There is a rare possibility of scarring of the liver and lung problems that can occur when taking methotrexate. Persistent nausea, loss of appetite, pale stools, dark urine, cough, and shortness of breath should be reported immediately. Patient advised to discontinue Arava treatment and consult with a physician prior to attempting conception. The patient will have to undergo a treatment to eliminate Arava from the body prior to conception.
Cosentyx Counseling:  I discussed with the patient the risks of Cosentyx including but not limited to worsening of Crohn's disease, immunosuppression, allergic reactions and infections.  The patient understands that monitoring is required including a PPD at baseline and must alert us or the primary physician if symptoms of infection or other concerning signs are noted.
Siliq Pregnancy And Lactation Text: The risk during pregnancy and breastfeeding is uncertain with this medication.
Solaraze Pregnancy And Lactation Text: This medication is Pregnancy Category B and is considered safe. There is some data to suggest avoiding during the third trimester. It is unknown if this medication is excreted in breast milk.
Colchicine Pregnancy And Lactation Text: This medication is Pregnancy Category C and isn't considered safe during pregnancy. It is excreted in breast milk.
Imiquimod Counseling:  I discussed with the patient the risks of imiquimod including but not limited to erythema, scaling, itching, weeping, crusting, and pain.  Patient understands that the inflammatory response to imiquimod is variable from person to person and was educated regarded proper titration schedule.  If flu-like symptoms develop, patient knows to discontinue the medication and contact us.
Doxepin Counseling:  Patient advised that the medication is sedating and not to drive a car after taking this medication. Patient informed of potential adverse effects including but not limited to dry mouth, urinary retention, and blurry vision.  The patient verbalized understanding of the proper use and possible adverse effects of doxepin.  All of the patient's questions and concerns were addressed.
Azathioprine Pregnancy And Lactation Text: This medication is Pregnancy Category D and isn't considered safe during pregnancy. It is unknown if this medication is excreted in breast milk.
Acitretin Counseling:  I discussed with the patient the risks of acitretin including but not limited to hair loss, dry lips/skin/eyes, liver damage, hyperlipidemia, depression/suicidal ideation, photosensitivity.  Serious rare side effects can include but are not limited to pancreatitis, pseudotumor cerebri, bony changes, clot formation/stroke/heart attack.  Patient understands that alcohol is contraindicated since it can result in liver toxicity and significantly prolong the elimination of the drug by many years.
Methotrexate Pregnancy And Lactation Text: This medication is Pregnancy Category X and is known to cause fetal harm. This medication is excreted in breast milk.
Itraconazole Pregnancy And Lactation Text: This medication is Pregnancy Category C and it isn't know if it is safe during pregnancy. It is also excreted in breast milk.
Prednisone Counseling:  I discussed with the patient the risks of prolonged use of prednisone including but not limited to weight gain, insomnia, osteoporosis, mood changes, diabetes, susceptibility to infection, glaucoma and high blood pressure.  In cases where prednisone use is prolonged, patients should be monitored with blood pressure checks, serum glucose levels and an eye exam.  Additionally, the patient may need to be placed on GI prophylaxis, PCP prophylaxis, and calcium and vitamin D supplementation and/or a bisphosphonate.  The patient verbalized understanding of the proper use and the possible adverse effects of prednisone.  All of the patient's questions and concerns were addressed.
Rifampin Counseling: I discussed with the patient the risks of rifampin including but not limited to liver damage, kidney damage, red-orange body fluids, nausea/vomiting and severe allergy.
Itraconazole Counseling:  I discussed with the patient the risks of itraconazole including but not limited to liver damage, nausea/vomiting, neuropathy, and severe allergy.  The patient understands that this medication is best absorbed when taken with acidic beverages such as non-diet cola or ginger ale.  The patient understands that monitoring is required including baseline LFTs and repeat LFTs at intervals.  The patient understands that they are to contact us or the primary physician if concerning signs are noted.
Detail Level: Detailed
Methotrexate Counseling:  Patient counseled regarding adverse effects of methotrexate including but not limited to nausea, vomiting, abnormalities in liver function tests. Patients may develop mouth sores, rash, diarrhea, and abnormalities in blood counts. The patient understands that monitoring is required including LFT's and blood counts.  There is a rare possibility of scarring of the liver and lung problems that can occur when taking methotrexate. Persistent nausea, loss of appetite, pale stools, dark urine, cough, and shortness of breath should be reported immediately. Patient advised to discontinue methotrexate treatment at least three months before attempting to become pregnant.  I discussed the need for folate supplements while taking methotrexate.  These supplements can decrease side effects during methotrexate treatment. The patient verbalized understanding of the proper use and possible adverse effects of methotrexate.  All of the patient's questions and concerns were addressed.
Prednisone Pregnancy And Lactation Text: This medication is Pregnancy Category C and it isn't know if it is safe during pregnancy. This medication is excreted in breast milk.
Colchicine Counseling:  Patient counseled regarding adverse effects including but not limited to stomach upset (nausea, vomiting, stomach pain, or diarrhea).  Patient instructed to limit alcohol consumption while taking this medication.  Colchicine may reduce blood counts especially with prolonged use.  The patient understands that monitoring of kidney function and blood counts may be required, especially at baseline. The patient verbalized understanding of the proper use and possible adverse effects of colchicine.  All of the patient's questions and concerns were addressed.
Infliximab Pregnancy And Lactation Text: This medication is Pregnancy Category B and is considered safe during pregnancy. It is unknown if this medication is excreted in breast milk.
Cellcept Counseling:  I discussed with the patient the risks of mycophenolate mofetil including but not limited to infection/immunosuppression, GI upset, hypokalemia, hypercholesterolemia, bone marrow suppression, lymphoproliferative disorders, malignancy, GI ulceration/bleed/perforation, colitis, interstitial lung disease, kidney failure, progressive multifocal leukoencephalopathy, and birth defects.  The patient understands that monitoring is required including a baseline creatinine and regular CBC testing. In addition, patient must alert us immediately if symptoms of infection or other concerning signs are noted.
Cephalexin Pregnancy And Lactation Text: This medication is Pregnancy Category B and considered safe during pregnancy.  It is also excreted in breast milk but can be used safely for shorter doses.
Bexarotene Pregnancy And Lactation Text: This medication is Pregnancy Category X and should not be given to women who are pregnant or may become pregnant. This medication should not be used if you are breast feeding.
Taltz Counseling: I discussed with the patient the risks of ixekizumab including but not limited to immunosuppression, serious infections, worsening of inflammatory bowel disease and drug reactions.  The patient understands that monitoring is required including a PPD at baseline and must alert us or the primary physician if symptoms of infection or other concerning signs are noted.
Cyclophosphamide Pregnancy And Lactation Text: This medication is Pregnancy Category D and it isn't considered safe during pregnancy. This medication is excreted in breast milk.
Birth Control Pills Counseling: Birth Control Pill Counseling: I discussed with the patient the potential side effects of OCPs including but not limited to increased risk of stroke, heart attack, thrombophlebitis, deep venous thrombosis, hepatic adenomas, breast changes, GI upset, headaches, and depression.  The patient verbalized understanding of the proper use and possible adverse effects of OCPs. All of the patient's questions and concerns were addressed.
Cyclophosphamide Counseling:  I discussed with the patient the risks of cyclophosphamide including but not limited to hair loss, hormonal abnormalities, decreased fertility, abdominal pain, diarrhea, nausea and vomiting, bone marrow suppression and infection. The patient understands that monitoring is required while taking this medication.
Rifampin Pregnancy And Lactation Text: This medication is Pregnancy Category C and it isn't know if it is safe during pregnancy. It is also excreted in breast milk and should not be used if you are breast feeding.
Glycopyrrolate Counseling:  I discussed with the patient the risks of glycopyrrolate including but not limited to skin rash, drowsiness, dry mouth, difficulty urinating, and blurred vision.
Dapsone Counseling: I discussed with the patient the risks of dapsone including but not limited to hemolytic anemia, agranulocytosis, rashes, methemoglobinemia, kidney failure, peripheral neuropathy, headaches, GI upset, and liver toxicity.  Patients who start dapsone require monitoring including baseline LFTs and weekly CBCs for the first month, then every month thereafter.  The patient verbalized understanding of the proper use and possible adverse effects of dapsone.  All of the patient's questions and concerns were addressed.
Fluconazole Counseling:  Patient counseled regarding adverse effects of fluconazole including but not limited to headache, diarrhea, nausea, upset stomach, liver function test abnormalities, taste disturbance, and stomach pain.  There is a rare possibility of liver failure that can occur when taking fluconazole.  The patient understands that monitoring of LFTs and kidney function test may be required, especially at baseline. The patient verbalized understanding of the proper use and possible adverse effects of fluconazole.  All of the patient's questions and concerns were addressed.
Hydroquinone Counseling:  Patient advised that medication may result in skin irritation, lightening (hypopigmentation), dryness, and burning.  In the event of skin irritation, the patient was advised to reduce the amount of the drug applied or use it less frequently.  Rarely, spots that are treated with hydroquinone can become darker (pseudoochronosis).  Should this occur, patient instructed to stop medication and call the office. The patient verbalized understanding of the proper use and possible adverse effects of hydroquinone.  All of the patient's questions and concerns were addressed.
Enbrel Counseling:  I discussed with the patient the risks of etanercept including but not limited to myelosuppression, immunosuppression, autoimmune hepatitis, demyelinating diseases, lymphoma, and infections.  The patient understands that monitoring is required including a PPD at baseline and must alert us or the primary physician if symptoms of infection or other concerning signs are noted.
Valtrex Pregnancy And Lactation Text: this medication is Pregnancy Category B and is considered safe during pregnancy. This medication is not directly found in breast milk but it's metabolite acyclovir is present.
Acitretin Pregnancy And Lactation Text: This medication is Pregnancy Category X and should not be given to women who are pregnant or may become pregnant in the future. This medication is excreted in breast milk.
Ivermectin Pregnancy And Lactation Text: This medication is Pregnancy Category C and it isn't known if it is safe during pregnancy. It is also excreted in breast milk.
Xelmerlinz Pregnancy And Lactation Text: This medication is Pregnancy Category D and is not considered safe during pregnancy.  The risk during breast feeding is also uncertain.
Doxycycline Counseling:  Patient counseled regarding possible photosensitivity and increased risk for sunburn.  Patient instructed to avoid sunlight, if possible.  When exposed to sunlight, patients should wear protective clothing, sunglasses, and sunscreen.  The patient was instructed to call the office immediately if the following severe adverse effects occur:  hearing changes, easy bruising/bleeding, severe headache, or vision changes.  The patient verbalized understanding of the proper use and possible adverse effects of doxycycline.  All of the patient's questions and concerns were addressed.
Eucrisa Counseling: Patient may experience a mild burning sensation during topical application. Eucrisa is not approved in children less than 2 years of age.
Oxybutynin Counseling:  I discussed with the patient the risks of oxybutynin including but not limited to skin rash, drowsiness, dry mouth, difficulty urinating, and blurred vision.
Erythromycin Counseling:  I discussed with the patient the risks of erythromycin including but not limited to GI upset, allergic reaction, drug rash, diarrhea, increase in liver enzymes, and yeast infections.
Xeljanz Counseling: I discussed with the patient the risks of Xeljanz therapy including increased risk of infection, liver issues, headache, diarrhea, or cold symptoms. Live vaccines should be avoided. They were instructed to call if they have any problems.
Ilumya Counseling: I discussed with the patient the risks of tildrakizumab including but not limited to immunosuppression, malignancy, posterior leukoencephalopathy syndrome, and serious infections.  The patient understands that monitoring is required including a PPD at baseline and must alert us or the primary physician if symptoms of infection or other concerning signs are noted.
Sarecycline Counseling: Patient advised regarding possible photosensitivity and discoloration of the teeth, skin, lips, tongue and gums.  Patient instructed to avoid sunlight, if possible.  When exposed to sunlight, patients should wear protective clothing, sunglasses, and sunscreen.  The patient was instructed to call the office immediately if the following severe adverse effects occur:  hearing changes, easy bruising/bleeding, severe headache, or vision changes.  The patient verbalized understanding of the proper use and possible adverse effects of sarecycline.  All of the patient's questions and concerns were addressed.
High Dose Vitamin A Counseling: Side effects reviewed, pt to contact office should one occur.
Drysol Pregnancy And Lactation Text: This medication is considered safe during pregnancy and breast feeding.
Terbinafine Counseling: Patient counseling regarding adverse effects of terbinafine including but not limited to headache, diarrhea, rash, upset stomach, liver function test abnormalities, itching, taste/smell disturbance, nausea, abdominal pain, and flatulence.  There is a rare possibility of liver failure that can occur when taking terbinafine.  The patient understands that a baseline LFT and kidney function test may be required. The patient verbalized understanding of the proper use and possible adverse effects of terbinafine.  All of the patient's questions and concerns were addressed.
Griseofulvin Pregnancy And Lactation Text: This medication is Pregnancy Category X and is known to cause serious birth defects. It is unknown if this medication is excreted in breast milk but breast feeding should be avoided.
Thalidomide Counseling: I discussed with the patient the risks of thalidomide including but not limited to birth defects, anxiety, weakness, chest pain, dizziness, cough and severe allergy.
Arava Pregnancy And Lactation Text: This medication is Pregnancy Category X and is absolutely contraindicated during pregnancy. It is unknown if it is excreted in breast milk.
Doxepin Pregnancy And Lactation Text: This medication is Pregnancy Category C and it isn't known if it is safe during pregnancy. It is also excreted in breast milk and breast feeding isn't recommended.
Ketoconazole Counseling:   Patient counseled regarding improving absorption with orange juice.  Adverse effects include but are not limited to breast enlargement, headache, diarrhea, nausea, upset stomach, liver function test abnormalities, taste disturbance, and stomach pain.  There is a rare possibility of liver failure that can occur when taking ketoconazole. The patient understands that monitoring of LFTs may be required, especially at baseline. The patient verbalized understanding of the proper use and possible adverse effects of ketoconazole.  All of the patient's questions and concerns were addressed.
Cimzia Counseling:  I discussed with the patient the risks of Cimzia including but not limited to immunosuppression, allergic reactions and infections.  The patient understands that monitoring is required including a PPD at baseline and must alert us or the primary physician if symptoms of infection or other concerning signs are noted.
Tazorac Counseling:  Patient advised that medication is irritating and drying.  Patient may need to apply sparingly and wash off after an hour before eventually leaving it on overnight.  The patient verbalized understanding of the proper use and possible adverse effects of tazorac.  All of the patient's questions and concerns were addressed.
Quinolones Counseling:  I discussed with the patient the risks of fluoroquinolones including but not limited to GI upset, allergic reaction, drug rash, diarrhea, dizziness, photosensitivity, yeast infections, liver function test abnormalities, tendonitis/tendon rupture.
Clindamycin Counseling: I counseled the patient regarding use of clindamycin as an antibiotic for prophylactic and/or therapeutic purposes. Clindamycin is active against numerous classes of bacteria, including skin bacteria. Side effects may include nausea, diarrhea, gastrointestinal upset, rash, hives, yeast infections, and in rare cases, colitis.
Carac Counseling:  I discussed with the patient the risks of Carac including but not limited to erythema, scaling, itching, weeping, crusting, and pain.
SSKI Counseling:  I discussed with the patient the risks of SSKI including but not limited to thyroid abnormalities, metallic taste, GI upset, fever, headache, acne, arthralgias, paraesthesias, lymphadenopathy, easy bleeding, arrhythmias, and allergic reaction.
Sski Pregnancy And Lactation Text: This medication is Pregnancy Category D and isn't considered safe during pregnancy. It is excreted in breast milk.
Tremfya Counseling: I discussed with the patient the risks of guselkumab including but not limited to immunosuppression, serious infections, and drug reactions.  The patient understands that monitoring is required including a PPD at baseline and must alert us or the primary physician if symptoms of infection or other concerning signs are noted.
Stelara Counseling:  I discussed with the patient the risks of ustekinumab including but not limited to immunosuppression, malignancy, posterior leukoencephalopathy syndrome, and serious infections.  The patient understands that monitoring is required including a PPD at baseline and must alert us or the primary physician if symptoms of infection or other concerning signs are noted.
Albendazole Counseling:  I discussed with the patient the risks of albendazole including but not limited to cytopenia, kidney damage, nausea/vomiting and severe allergy.  The patient understands that this medication is being used in an off-label manner.
Hydroxychloroquine Counseling:  I discussed with the patient that a baseline ophthalmologic exam is needed at the start of therapy and every year thereafter while on therapy. A CBC may also be warranted for monitoring.  The side effects of this medication were discussed with the patient, including but not limited to agranulocytosis, aplastic anemia, seizures, rashes, retinopathy, and liver toxicity. Patient instructed to call the office should any adverse effect occur.  The patient verbalized understanding of the proper use and possible adverse effects of Plaquenil.  All the patient's questions and concerns were addressed.
Ketoconazole Pregnancy And Lactation Text: This medication is Pregnancy Category C and it isn't know if it is safe during pregnancy. It is also excreted in breast milk and breast feeding isn't recommended.
Minocycline Counseling: Patient advised regarding possible photosensitivity and discoloration of the teeth, skin, lips, tongue and gums.  Patient instructed to avoid sunlight, if possible.  When exposed to sunlight, patients should wear protective clothing, sunglasses, and sunscreen.  The patient was instructed to call the office immediately if the following severe adverse effects occur:  hearing changes, easy bruising/bleeding, severe headache, or vision changes.  The patient verbalized understanding of the proper use and possible adverse effects of minocycline.  All of the patient's questions and concerns were addressed.
Benzoyl Peroxide Counseling: Patient counseled that medicine may cause skin irritation and bleach clothing.  In the event of skin irritation, the patient was advised to reduce the amount of the drug applied or use it less frequently.   The patient verbalized understanding of the proper use and possible adverse effects of benzoyl peroxide.  All of the patient's questions and concerns were addressed.
Spironolactone Pregnancy And Lactation Text: This medication can cause feminization of the male fetus and should be avoided during pregnancy. The active metabolite is also found in breast milk.
Hydroxyzine Pregnancy And Lactation Text: This medication is not safe during pregnancy and should not be taken. It is also excreted in breast milk and breast feeding isn't recommended.
Zyclara Counseling:  I discussed with the patient the risks of imiquimod including but not limited to erythema, scaling, itching, weeping, crusting, and pain.  Patient understands that the inflammatory response to imiquimod is variable from person to person and was educated regarded proper titration schedule.  If flu-like symptoms develop, patient knows to discontinue the medication and contact us.
Use Enhanced Medication Counseling?: No
Bexarotene Counseling:  I discussed with the patient the risks of bexarotene including but not limited to hair loss, dry lips/skin/eyes, liver abnormalities, hyperlipidemia, pancreatitis, depression/suicidal ideation, photosensitivity, drug rash/allergic reactions, hypothyroidism, anemia, leukopenia, infection, cataracts, and teratogenicity.  Patient understands that they will need regular blood tests to check lipid profile, liver function tests, white blood cell count, thyroid function tests and pregnancy test if applicable.
Azathioprine Counseling:  I discussed with the patient the risks of azathioprine including but not limited to myelosuppression, immunosuppression, hepatotoxicity, lymphoma, and infections.  The patient understands that monitoring is required including baseline LFTs, Creatinine, possible TPMP genotyping and weekly CBCs for the first month and then every 2 weeks thereafter.  The patient verbalized understanding of the proper use and possible adverse effects of azathioprine.  All of the patient's questions and concerns were addressed.
Topical Sulfur Applications Pregnancy And Lactation Text: This medication is Pregnancy Category C and has an unknown safety profile during pregnancy. It is unknown if this topical medication is excreted in breast milk.
Topical Retinoid counseling:  Patient advised to apply a pea-sized amount only at bedtime and wait 30 minutes after washing their face before applying.  If too drying, patient may add a non-comedogenic moisturizer. The patient verbalized understanding of the proper use and possible adverse effects of retinoids.  All of the patient's questions and concerns were addressed.
Clindamycin Pregnancy And Lactation Text: This medication can be used in pregnancy if certain situations. Clindamycin is also present in breast milk.
Tazorac Pregnancy And Lactation Text: This medication is not safe during pregnancy. It is unknown if this medication is excreted in breast milk.
Benzoyl Peroxide Pregnancy And Lactation Text: This medication is Pregnancy Category C. It is unknown if benzoyl peroxide is excreted in breast milk.
Topical Sulfur Applications Counseling: Topical Sulfur Counseling: Patient counseled that this medication may cause skin irritation or allergic reactions.  In the event of skin irritation, the patient was advised to reduce the amount of the drug applied or use it less frequently.   The patient verbalized understanding of the proper use and possible adverse effects of topical sulfur application.  All of the patient's questions and concerns were addressed.
Metronidazole Counseling:  I discussed with the patient the risks of metronidazole including but not limited to seizures, nausea/vomiting, a metallic taste in the mouth, nausea/vomiting and severe allergy.
Dapsone Pregnancy And Lactation Text: This medication is Pregnancy Category C and is not considered safe during pregnancy or breast feeding.
Cimetidine Counseling:  I discussed with the patient the risks of Cimetidine including but not limited to gynecomastia, headache, diarrhea, nausea, drowsiness, arrhythmias, pancreatitis, skin rashes, psychosis, bone marrow suppression and kidney toxicity.
Hydroxychloroquine Pregnancy And Lactation Text: This medication has been shown to cause fetal harm but it isn't assigned a Pregnancy Risk Category. There are small amounts excreted in breast milk.
Erythromycin Pregnancy And Lactation Text: This medication is Pregnancy Category B and is considered safe during pregnancy. It is also excreted in breast milk.
Valtrex Counseling: I discussed with the patient the risks of valacyclovir including but not limited to kidney damage, nausea, vomiting and severe allergy.  The patient understands that if the infection seems to be worsening or is not improving, they are to call.
Doxycycline Pregnancy And Lactation Text: This medication is Pregnancy Category D and not consider safe during pregnancy. It is also excreted in breast milk but is considered safe for shorter treatment courses.
Nsaids Pregnancy And Lactation Text: These medications are considered safe up to 30 weeks gestation. It is excreted in breast milk.
Humira Counseling:  I discussed with the patient the risks of adalimumab including but not limited to myelosuppression, immunosuppression, autoimmune hepatitis, demyelinating diseases, lymphoma, and serious infections.  The patient understands that monitoring is required including a PPD at baseline and must alert us or the primary physician if symptoms of infection or other concerning signs are noted.
Rituxan Pregnancy And Lactation Text: This medication is Pregnancy Category C and it isn't know if it is safe during pregnancy. It is unknown if this medication is excreted in breast milk but similar antibodies are known to be excreted.
5-Fu Counseling: 5-Fluorouracil Counseling:  I discussed with the patient the risks of 5-fluorouracil including but not limited to erythema, scaling, itching, weeping, crusting, and pain.
Drysol Counseling:  I discussed with the patient the risks of drysol/aluminum chloride including but not limited to skin rash, itching, irritation, burning.
Glycopyrrolate Pregnancy And Lactation Text: This medication is Pregnancy Category B and is considered safe during pregnancy. It is unknown if it is excreted breast milk.
Griseofulvin Counseling:  I discussed with the patient the risks of griseofulvin including but not limited to photosensitivity, cytopenia, liver damage, nausea/vomiting and severe allergy.  The patient understands that this medication is best absorbed when taken with a fatty meal (e.g., ice cream or french fries).
Cimzia Pregnancy And Lactation Text: This medication crosses the placenta but can be considered safe in certain situations. Cimzia may be excreted in breast milk.
5-Fu Pregnancy And Lactation Text: This medication is Pregnancy Category X and contraindicated in pregnancy and in women who may become pregnant. It is unknown if this medication is excreted in breast milk.
Skyrizi Counseling: I discussed with the patient the risks of risankizumab-rzaa including but not limited to immunosuppression, and serious infections.  The patient understands that monitoring is required including a PPD at baseline and must alert us or the primary physician if symptoms of infection or other concerning signs are noted.
Tetracycline Counseling: Patient counseled regarding possible photosensitivity and increased risk for sunburn.  Patient instructed to avoid sunlight, if possible.  When exposed to sunlight, patients should wear protective clothing, sunglasses, and sunscreen.  The patient was instructed to call the office immediately if the following severe adverse effects occur:  hearing changes, easy bruising/bleeding, severe headache, or vision changes.  The patient verbalized understanding of the proper use and possible adverse effects of tetracycline.  All of the patient's questions and concerns were addressed. Patient understands to avoid pregnancy while on therapy due to potential birth defects.
Terbinafine Pregnancy And Lactation Text: This medication is Pregnancy Category B and is considered safe during pregnancy. It is also excreted in breast milk and breast feeding isn't recommended.
Azithromycin Pregnancy And Lactation Text: This medication is considered safe during pregnancy and is also secreted in breast milk.
Cyclosporine Counseling:  I discussed with the patient the risks of cyclosporine including but not limited to hypertension, gingival hyperplasia,myelosuppression, immunosuppression, liver damage, kidney damage, neurotoxicity, lymphoma, and serious infections. The patient understands that monitoring is required including baseline blood pressure, CBC, CMP, lipid panel and uric acid, and then 1-2 times monthly CMP and blood pressure.
Birth Control Pills Pregnancy And Lactation Text: This medication should be avoided if pregnant and for the first 30 days post-partum.
Siliq Counseling:  I discussed with the patient the risks of Siliq including but not limited to new or worsening depression, suicidal thoughts and behavior, immunosuppression, malignancy, posterior leukoencephalopathy syndrome, and serious infections.  The patient understands that monitoring is required including a PPD at baseline and must alert us or the primary physician if symptoms of infection or other concerning signs are noted. There is also a special program designed to monitor depression which is required with Siliq.
Cephalexin Counseling: I counseled the patient regarding use of cephalexin as an antibiotic for prophylactic and/or therapeutic purposes. Cephalexin (commonly prescribed under brand name Keflex) is a cephalosporin antibiotic which is active against numerous classes of bacteria, including most skin bacteria. Side effects may include nausea, diarrhea, gastrointestinal upset, rash, hives, yeast infections, and in rare cases, hepatitis, kidney disease, seizures, fever, confusion, neurologic symptoms, and others. Patients with severe allergies to penicillin medications are cautioned that there is about a 10% incidence of cross-reactivity with cephalosporins. When possible, patients with penicillin allergies should use alternatives to cephalosporins for antibiotic therapy.
Bactrim Counseling:  I discussed with the patient the risks of sulfa antibiotics including but not limited to GI upset, allergic reaction, drug rash, diarrhea, dizziness, photosensitivity, and yeast infections.  Rarely, more serious reactions can occur including but not limited to aplastic anemia, agranulocytosis, methemoglobinemia, blood dyscrasias, liver or kidney failure, lung infiltrates or desquamative/blistering drug rashes.
Minoxidil Counseling: Minoxidil is a topical medication which can increase blood flow where it is applied. It is uncertain how this medication increases hair growth. Side effects are uncommon and include stinging and allergic reactions.
Odomzo Counseling- I discussed with the patient the risks of Odomzo including but not limited to nausea, vomiting, diarrhea, constipation, weight loss, changes in the sense of taste, decreased appetite, muscle spasms, and hair loss.  The patient verbalized understanding of the proper use and possible adverse effects of Odomzo.  All of the patient's questions and concerns were addressed.
Spironolactone Counseling: Patient advised regarding risks of diarrhea, abdominal pain, hyperkalemia, birth defects (for female patients), liver toxicity and renal toxicity. The patient may need blood work to monitor liver and kidney function and potassium levels while on therapy. The patient verbalized understanding of the proper use and possible adverse effects of spironolactone.  All of the patient's questions and concerns were addressed.
Bactrim Pregnancy And Lactation Text: This medication is Pregnancy Category D and is known to cause fetal risk.  It is also excreted in breast milk.
Otezla Counseling: The side effects of Otezla were discussed with the patient, including but not limited to worsening or new depression, weight loss, diarrhea, nausea, upper respiratory tract infection, and headache. Patient instructed to call the office should any adverse effect occur.  The patient verbalized understanding of the proper use and possible adverse effects of Otezla.  All the patient's questions and concerns were addressed.
High Dose Vitamin A Pregnancy And Lactation Text: High dose vitamin A therapy is contraindicated during pregnancy and breast feeding.
Infliximab Counseling:  I discussed with the patient the risks of infliximab including but not limited to myelosuppression, immunosuppression, autoimmune hepatitis, demyelinating diseases, lymphoma, and serious infections.  The patient understands that monitoring is required including a PPD at baseline and must alert us or the primary physician if symptoms of infection or other concerning signs are noted.
Picato Counseling:  I discussed with the patient the risks of Picato including but not limited to erythema, scaling, itching, weeping, crusting, and pain.
Gabapentin Counseling: I discussed with the patient the risks of gabapentin including but not limited to dizziness, somnolence, fatigue and ataxia.
Ivermectin Counseling:  Patient instructed to take medication on an empty stomach with a full glass of water.  Patient informed of potential adverse effects including but not limited to nausea, diarrhea, dizziness, itching, and swelling of the extremities or lymph nodes.  The patient verbalized understanding of the proper use and possible adverse effects of ivermectin.  All of the patient's questions and concerns were addressed.
Nsaids Counseling: NSAID Counseling: I discussed with the patient that NSAIDs should be taken with food. Prolonged use of NSAIDs can result in the development of stomach ulcers.  Patient advised to stop taking NSAIDs if abdominal pain occurs.  The patient verbalized understanding of the proper use and possible adverse effects of NSAIDs.  All of the patient's questions and concerns were addressed.
Dupixent Counseling: I discussed with the patient the risks of dupilumab including but not limited to eye infection and irritation, cold sores, injection site reactions, worsening of asthma, allergic reactions and increased risk of parasitic infection.  Live vaccines should be avoided while taking dupilumab. Dupilumab will also interact with certain medications such as warfarin and cyclosporine. The patient understands that monitoring is required and they must alert us or the primary physician if symptoms of infection or other concerning signs are noted.
Protopic Pregnancy And Lactation Text: This medication is Pregnancy Category C. It is unknown if this medication is excreted in breast milk when applied topically.
Hydroxyzine Counseling: Patient advised that the medication is sedating and not to drive a car after taking this medication.  Patient informed of potential adverse effects including but not limited to dry mouth, urinary retention, and blurry vision.  The patient verbalized understanding of the proper use and possible adverse effects of hydroxyzine.  All of the patient's questions and concerns were addressed.
Isotretinoin Pregnancy And Lactation Text: This medication is Pregnancy Category X and is considered extremely dangerous during pregnancy. It is unknown if it is excreted in breast milk.

## 2023-06-23 ENCOUNTER — APPOINTMENT (OUTPATIENT)
Dept: URBAN - METROPOLITAN AREA CLINIC 258 | Age: 23
Setting detail: DERMATOLOGY
End: 2023-06-23

## 2023-06-23 VITALS — WEIGHT: 150 LBS | HEIGHT: 67 IN

## 2023-06-23 DIAGNOSIS — L70.0 ACNE VULGARIS: ICD-10-CM

## 2023-06-23 DIAGNOSIS — Z79.899 OTHER LONG TERM (CURRENT) DRUG THERAPY: ICD-10-CM

## 2023-06-23 PROCEDURE — 99214 OFFICE O/P EST MOD 30 MIN: CPT

## 2023-06-23 PROCEDURE — OTHER URINE PREGNANCY TEST: OTHER

## 2023-06-23 PROCEDURE — OTHER MIPS QUALITY: OTHER

## 2023-06-23 PROCEDURE — OTHER ISOTRETINOIN MONITORING: OTHER

## 2023-06-23 PROCEDURE — OTHER PRESCRIPTION MEDICATION MANAGEMENT: OTHER

## 2023-06-23 PROCEDURE — OTHER HIGH RISK MEDICATION MONITORING: OTHER

## 2023-06-23 PROCEDURE — OTHER PRESCRIPTION: OTHER

## 2023-06-23 PROCEDURE — 81025 URINE PREGNANCY TEST: CPT

## 2023-06-23 PROCEDURE — OTHER COUNSELING: OTHER

## 2023-06-23 RX ORDER — ISOTRETINOIN 40 MG/1
CAPSULE, LIQUID FILLED ORAL
Qty: 30 | Refills: 0 | Status: ERX | COMMUNITY
Start: 2023-06-23

## 2023-06-23 RX ORDER — ISOTRETINOIN 30 MG/1
CAPSULE, LIQUID FILLED ORAL
Qty: 30 | Refills: 0 | Status: ERX | COMMUNITY
Start: 2023-06-23

## 2023-06-23 ASSESSMENT — LOCATION DETAILED DESCRIPTION DERM: LOCATION DETAILED: LEFT INFERIOR CENTRAL MALAR CHEEK

## 2023-06-23 ASSESSMENT — LOCATION SIMPLE DESCRIPTION DERM: LOCATION SIMPLE: LEFT CHEEK

## 2023-06-23 ASSESSMENT — LOCATION ZONE DERM: LOCATION ZONE: FACE

## 2023-06-23 NOTE — PROCEDURE: ISOTRETINOIN MONITORING
Show How Many Months Of Anticipated Therapy Are Left: No
Hypercholesterolemia Monitoring: I explained this is common when taking isotretinoin. We will monitor closely.
Dosing Month 4 (Required For Cumulative Dosing): 70mg Daily
Cheilitis Normal Treatment: I recommended application of Vaseline or Aquaphor numerous times a day (as often as every hour) and before going to bed.
Are Labs Available For Review?: No- Labs Deferred This Month
Headache Monitoring: I recommended monitoring the headaches for now. There is no evidence of increased intracranial pressure. They were instructed to call if the headaches are worsening.
Female Pregnancy Counseling Text: Female patients should also be on two forms of birth control while taking this medication and for one month after their last dose.
Use Therapeutic Ranged Or Therapeutic Target: please select Range or Target
Hypertriglyceridemia Monitoring: I explained this is common when taking isotretinoin. If this worsens they will contact us.
Pounds Preamble Statement (Weight Entered In Details Tab): Reported Weight in pounds:
Patient Weight (Optional But Required For Cumulative Dose-Numbers And Decimals Only): 150
Cheilitis Aggressive Treatment: I recommended application of Vaseline or Aquaphor numerous times a day (as often as every hour) and before going to bed. I also prescribed a topical steroid for twice daily use.
Xerosis Normal Treatment: I recommended application of Cetaphil or CeraVe numerous times a day going to bed to all dry areas.
Hypertriglyceridemia Treatment: I explained this is common when taking isotretinoin. If this worsens they will contact us. They may try OTC ibuprofen.
Kilograms Preamble Statement (Weight Entered In Details Tab): Reported Weight in kilograms:
Female Completion Statement: After discussing her treatment course we decided to discontinue isotretinoin therapy at this time. I explained that she would need to continue her birth control methods for at least one month after the last dosage. She should also get a pregnancy test one month after the last dose. She shouldn't donate blood for one month after the last dose. She should call with any new symptoms of depression.
Retinoid Dermatitis Normal Treatment: I recommended more frequent application of Cetaphil or CeraVe to the areas of dermatitis.
Weight Units: pounds
Lower Range (In Mg/Kg): 120
Nosebleeds Normal Treatment: I explained this is common when taking isotretinoin. I recommended saline mist in each nostril multiple times a day. If this worsens they will contact us.
Male Completion Statement: After discussing his treatment course we decided to discontinue isotretinoin therapy at this time. He shouldn't donate blood for one month after the last dose. He should call with any new symptoms of depression.
Xerosis Aggressive Treatment: I recommended application of Cetaphil or CeraVe numerous times a day going to bed to all dry areas. I also prescribed a topical steroid for twice daily use.
Retinoid Dermatitis Aggressive Treatment: I recommended more frequent application of Cetaphil or CeraVe to the areas of dermatitis. I also prescribed a topical steroid for twice daily use until the dermatitis resolves.
Months Of Therapy Completed: 5
Target Cumulative Dosage (In Mg/Kg): 135
Add Associated Diagnosis When Managing Medication Side Effects: Yes
Dosing Month 1 (Required For Cumulative Dosing): 30mg Daily
Xerosis Aggressive Treatment: I recommended application of Cetaphil or CeraVe numerous times a day and before going to bed to all dry areas. I also prescribed a topical steroid for twice daily use.
Dosing Month 2 (Required For Cumulative Dosing): 30mg BID
Next Month's Dosage: Continue Current Dosage
Detail Level: Zone
Xerosis Normal Treatment: I recommended application of Cetaphil or CeraVe numerous times a day and before going to bed to all dry areas.
What Is The Patient's Gender: Female
Counseling Text: I reviewed the side effect in detail. Patient should get monthly blood tests, not donate blood, not drive at night if vision affected, and not share medication.

## 2023-06-23 NOTE — PROCEDURE: COUNSELING
Topical Sulfur Applications Counseling: Topical Sulfur Counseling: Patient counseled that this medication may cause skin irritation or allergic reactions.  In the event of skin irritation, the patient was advised to reduce the amount of the drug applied or use it less frequently.   The patient verbalized understanding of the proper use and possible adverse effects of topical sulfur application.  All of the patient's questions and concerns were addressed.
Azithromycin Pregnancy And Lactation Text: This medication is considered safe during pregnancy and is also secreted in breast milk.
High Dose Vitamin A Pregnancy And Lactation Text: High dose vitamin A therapy is contraindicated during pregnancy and breast feeding.
Tetracycline Counseling: Patient counseled regarding possible photosensitivity and increased risk for sunburn.  Patient instructed to avoid sunlight, if possible.  When exposed to sunlight, patients should wear protective clothing, sunglasses, and sunscreen.  The patient was instructed to call the office immediately if the following severe adverse effects occur:  hearing changes, easy bruising/bleeding, severe headache, or vision changes.  The patient verbalized understanding of the proper use and possible adverse effects of tetracycline.  All of the patient's questions and concerns were addressed. Patient understands to avoid pregnancy while on therapy due to potential birth defects.
Benzoyl Peroxide Pregnancy And Lactation Text: This medication is Pregnancy Category C. It is unknown if benzoyl peroxide is excreted in breast milk.
Doxycycline Counseling:  Patient counseled regarding possible photosensitivity and increased risk for sunburn.  Patient instructed to avoid sunlight, if possible.  When exposed to sunlight, patients should wear protective clothing, sunglasses, and sunscreen.  The patient was instructed to call the office immediately if the following severe adverse effects occur:  hearing changes, easy bruising/bleeding, severe headache, or vision changes.  The patient verbalized understanding of the proper use and possible adverse effects of doxycycline.  All of the patient's questions and concerns were addressed.
Include Pregnancy/Lactation Warning?: No
Dapsone Counseling: I discussed with the patient the risks of dapsone including but not limited to hemolytic anemia, agranulocytosis, rashes, methemoglobinemia, kidney failure, peripheral neuropathy, headaches, GI upset, and liver toxicity.  Patients who start dapsone require monitoring including baseline LFTs and weekly CBCs for the first month, then every month thereafter.  The patient verbalized understanding of the proper use and possible adverse effects of dapsone.  All of the patient's questions and concerns were addressed.
Topical Clindamycin Counseling: Patient counseled that this medication may cause skin irritation or allergic reactions.  In the event of skin irritation, the patient was advised to reduce the amount of the drug applied or use it less frequently.   The patient verbalized understanding of the proper use and possible adverse effects of clindamycin.  All of the patient's questions and concerns were addressed.
Isotretinoin Pregnancy And Lactation Text: This medication is Pregnancy Category X and is considered extremely dangerous during pregnancy. It is unknown if it is excreted in breast milk.
Spironolactone Counseling: Patient advised regarding risks of diarrhea, abdominal pain, hyperkalemia, birth defects (for female patients), liver toxicity and renal toxicity. The patient may need blood work to monitor liver and kidney function and potassium levels while on therapy. The patient verbalized understanding of the proper use and possible adverse effects of spironolactone.  All of the patient's questions and concerns were addressed.
Azelaic Acid Pregnancy And Lactation Text: This medication is considered safe during pregnancy and breast feeding.
Aklief Pregnancy And Lactation Text: It is unknown if this medication is safe to use during pregnancy.  It is unknown if this medication is excreted in breast milk.  Breastfeeding women should use the topical cream on the smallest area of the skin for the shortest time needed while breastfeeding.  Do not apply to nipple and areola.
Tazorac Counseling:  Patient advised that medication is irritating and drying.  Patient may need to apply sparingly and wash off after an hour before eventually leaving it on overnight.  The patient verbalized understanding of the proper use and possible adverse effects of tazorac.  All of the patient's questions and concerns were addressed.
Erythromycin Pregnancy And Lactation Text: This medication is Pregnancy Category B and is considered safe during pregnancy. It is also excreted in breast milk.
Sarecycline Counseling: Patient advised regarding possible photosensitivity and discoloration of the teeth, skin, lips, tongue and gums.  Patient instructed to avoid sunlight, if possible.  When exposed to sunlight, patients should wear protective clothing, sunglasses, and sunscreen.  The patient was instructed to call the office immediately if the following severe adverse effects occur:  hearing changes, easy bruising/bleeding, severe headache, or vision changes.  The patient verbalized understanding of the proper use and possible adverse effects of sarecycline.  All of the patient's questions and concerns were addressed.
Birth Control Pills Counseling: Birth Control Pill Counseling: I discussed with the patient the potential side effects of OCPs including but not limited to increased risk of stroke, heart attack, thrombophlebitis, deep venous thrombosis, hepatic adenomas, breast changes, GI upset, headaches, and depression.  The patient verbalized understanding of the proper use and possible adverse effects of OCPs. All of the patient's questions and concerns were addressed.
Winlevi Pregnancy And Lactation Text: This medication is considered safe during pregnancy and breastfeeding.
Bactrim Counseling:  I discussed with the patient the risks of sulfa antibiotics including but not limited to GI upset, allergic reaction, drug rash, diarrhea, dizziness, photosensitivity, and yeast infections.  Rarely, more serious reactions can occur including but not limited to aplastic anemia, agranulocytosis, methemoglobinemia, blood dyscrasias, liver or kidney failure, lung infiltrates or desquamative/blistering drug rashes.
High Dose Vitamin A Counseling: Side effects reviewed, pt to contact office should one occur.
Spironolactone Pregnancy And Lactation Text: This medication can cause feminization of the male fetus and should be avoided during pregnancy. The active metabolite is also found in breast milk.
Dapsone Pregnancy And Lactation Text: This medication is Pregnancy Category C and is not considered safe during pregnancy or breast feeding.
Azithromycin Counseling:  I discussed with the patient the risks of azithromycin including but not limited to GI upset, allergic reaction, drug rash, diarrhea, and yeast infections.
Topical Clindamycin Pregnancy And Lactation Text: This medication is Pregnancy Category B and is considered safe during pregnancy. It is unknown if it is excreted in breast milk.
Benzoyl Peroxide Counseling: Patient counseled that medicine may cause skin irritation and bleach clothing.  In the event of skin irritation, the patient was advised to reduce the amount of the drug applied or use it less frequently.   The patient verbalized understanding of the proper use and possible adverse effects of benzoyl peroxide.  All of the patient's questions and concerns were addressed.
Topical Sulfur Applications Pregnancy And Lactation Text: This medication is Pregnancy Category C and has an unknown safety profile during pregnancy. It is unknown if this topical medication is excreted in breast milk.
Tetracycline Pregnancy And Lactation Text: This medication is Pregnancy Category D and not consider safe during pregnancy. It is also excreted in breast milk.
Topical Retinoid counseling:  Patient advised to apply a pea-sized amount only at bedtime and wait 30 minutes after washing their face before applying.  If too drying, patient may add a non-comedogenic moisturizer. The patient verbalized understanding of the proper use and possible adverse effects of retinoids.  All of the patient's questions and concerns were addressed.
Doxycycline Pregnancy And Lactation Text: This medication is Pregnancy Category D and not consider safe during pregnancy. It is also excreted in breast milk but is considered safe for shorter treatment courses.
Minocycline Counseling: Patient advised regarding possible photosensitivity and discoloration of the teeth, skin, lips, tongue and gums.  Patient instructed to avoid sunlight, if possible.  When exposed to sunlight, patients should wear protective clothing, sunglasses, and sunscreen.  The patient was instructed to call the office immediately if the following severe adverse effects occur:  hearing changes, easy bruising/bleeding, severe headache, or vision changes.  The patient verbalized understanding of the proper use and possible adverse effects of minocycline.  All of the patient's questions and concerns were addressed.
Detail Level: Zone
Birth Control Pills Pregnancy And Lactation Text: This medication should be avoided if pregnant and for the first 30 days post-partum.
Tazorac Pregnancy And Lactation Text: This medication is not safe during pregnancy. It is unknown if this medication is excreted in breast milk.
Azelaic Acid Counseling: Patient counseled that medicine may cause skin irritation and to avoid applying near the eyes.  In the event of skin irritation, the patient was advised to reduce the amount of the drug applied or use it less frequently.   The patient verbalized understanding of the proper use and possible adverse effects of azelaic acid.  All of the patient's questions and concerns were addressed.
Isotretinoin Counseling: Patient should get monthly blood tests, not donate blood, not drive at night if vision affected, not share medication, and not undergo elective surgery for 6 months after tx completed. Side effects reviewed, pt to contact office should one occur.
Bactrim Pregnancy And Lactation Text: This medication is Pregnancy Category D and is known to cause fetal risk.  It is also excreted in breast milk.
Erythromycin Counseling:  I discussed with the patient the risks of erythromycin including but not limited to GI upset, allergic reaction, drug rash, diarrhea, increase in liver enzymes, and yeast infections.
Topical Retinoid Pregnancy And Lactation Text: This medication is Pregnancy Category C. It is unknown if this medication is excreted in breast milk.
Aklief counseling:  Patient advised to apply a pea-sized amount only at bedtime and wait 30 minutes after washing their face before applying.  If too drying, patient may add a non-comedogenic moisturizer.  The most commonly reported side effects including irritation, redness, scaling, dryness, stinging, burning, itching, and increased risk of sunburn.  The patient verbalized understanding of the proper use and possible adverse effects of retinoids.  All of the patient's questions and concerns were addressed.
Winlevi Counseling:  I discussed with the patient the risks of topical clascoterone including but not limited to erythema, scaling, itching, and stinging. Patient voiced their understanding.

## 2023-06-23 NOTE — PROCEDURE: PRESCRIPTION MEDICATION MANAGEMENT
Detail Level: Zone
Render In Strict Bullet Format?: No
Plan: Still with active inflammatory breakouts\\nConsider continuing course to 200mg/mg (max dosing) \\nAside from xerosis, no adverse effects noted by patient\\nSince labs have been stable & no change in dosage last month, defer labs today.

## 2023-06-23 NOTE — PROCEDURE: HIGH RISK MEDICATION MONITORING
Consent (Temporal Branch)/Introductory Paragraph: The rationale for Mohs was explained to the patient and consent was obtained. The risks, benefits and alternatives to therapy were discussed in detail. Specifically, the risks of damage to the temporal branch of the facial nerve, infection, scarring, bleeding, prolonged wound healing, incomplete removal, allergy to anesthesia, and recurrence were addressed. Prior to the procedure, the treatment site was clearly identified and confirmed by the patient. All components of Universal Protocol/PAUSE Rule completed. Topical Ketoconazole Counseling: Patient counseled that this medication may cause skin irritation or allergic reactions.  In the event of skin irritation, the patient was advised to reduce the amount of the drug applied or use it less frequently.   The patient verbalized understanding of the proper use and possible adverse effects of ketoconazole.  All of the patient's questions and concerns were addressed.

## 2023-06-23 NOTE — PROCEDURE: HIGH RISK MEDICATION MONITORING
Administered Flu and covid vaccines. Lucille Barbour RN on 10/13/2022 at 11:52 AM     Doxepin Pregnancy And Lactation Text: This medication is Pregnancy Category C and it isn't known if it is safe during pregnancy. It is also excreted in breast milk and breast feeding isn't recommended.

## 2023-07-24 ENCOUNTER — APPOINTMENT (OUTPATIENT)
Dept: URBAN - METROPOLITAN AREA CLINIC 258 | Age: 23
Setting detail: DERMATOLOGY
End: 2023-07-25

## 2023-07-24 VITALS — WEIGHT: 150 LBS | HEIGHT: 67 IN

## 2023-07-24 DIAGNOSIS — Z79.899 OTHER LONG TERM (CURRENT) DRUG THERAPY: ICD-10-CM

## 2023-07-24 DIAGNOSIS — L70.0 ACNE VULGARIS: ICD-10-CM

## 2023-07-24 PROCEDURE — OTHER PRESCRIPTION: OTHER

## 2023-07-24 PROCEDURE — 81025 URINE PREGNANCY TEST: CPT

## 2023-07-24 PROCEDURE — OTHER VENIPUNCTURE: OTHER

## 2023-07-24 PROCEDURE — OTHER PRESCRIPTION MEDICATION MANAGEMENT: OTHER

## 2023-07-24 PROCEDURE — 99214 OFFICE O/P EST MOD 30 MIN: CPT

## 2023-07-24 PROCEDURE — OTHER URINE PREGNANCY TEST: OTHER

## 2023-07-24 PROCEDURE — OTHER COUNSELING: OTHER

## 2023-07-24 PROCEDURE — OTHER MIPS QUALITY: OTHER

## 2023-07-24 PROCEDURE — OTHER ORDER TESTS: OTHER

## 2023-07-24 PROCEDURE — 36415 COLL VENOUS BLD VENIPUNCTURE: CPT

## 2023-07-24 PROCEDURE — OTHER ISOTRETINOIN MONITORING: OTHER

## 2023-07-24 PROCEDURE — OTHER HIGH RISK MEDICATION MONITORING: OTHER

## 2023-07-24 RX ORDER — ISOTRETINOIN 40 MG/1
CAPSULE, LIQUID FILLED ORAL
Qty: 30 | Refills: 0 | Status: ERX

## 2023-07-24 RX ORDER — ISOTRETINOIN 30 MG/1
CAPSULE, LIQUID FILLED ORAL
Qty: 30 | Refills: 0 | Status: ERX

## 2023-07-24 ASSESSMENT — LOCATION ZONE DERM
LOCATION ZONE: ARM
LOCATION ZONE: FACE

## 2023-07-24 ASSESSMENT — LOCATION DETAILED DESCRIPTION DERM
LOCATION DETAILED: LEFT ANTECUBITAL SKIN
LOCATION DETAILED: LEFT INFERIOR CENTRAL MALAR CHEEK

## 2023-07-24 ASSESSMENT — LOCATION SIMPLE DESCRIPTION DERM
LOCATION SIMPLE: LEFT UPPER ARM
LOCATION SIMPLE: LEFT CHEEK

## 2023-07-24 NOTE — PROCEDURE: ISOTRETINOIN MONITORING
How Severe Is Your Skin Lesion?: mild
Have Your Skin Lesions Been Treated?: not been treated
Is This A New Presentation, Or A Follow-Up?: Skin Lesions
Show How Many Months Of Anticipated Therapy Are Left: No
Hypercholesterolemia Monitoring: I explained this is common when taking isotretinoin. We will monitor closely.
Dosing Month 4 (Required For Cumulative Dosing): 70mg Daily
Cheilitis Normal Treatment: I recommended application of Vaseline or Aquaphor numerous times a day (as often as every hour) and before going to bed.
Are Labs Available For Review?: No- Labs Deferred This Month
Headache Monitoring: I recommended monitoring the headaches for now. There is no evidence of increased intracranial pressure. They were instructed to call if the headaches are worsening.
Female Pregnancy Counseling Text: Female patients should also be on two forms of birth control while taking this medication and for one month after their last dose.
Use Therapeutic Ranged Or Therapeutic Target: please select Range or Target
Hypertriglyceridemia Monitoring: I explained this is common when taking isotretinoin. If this worsens they will contact us.
Pounds Preamble Statement (Weight Entered In Details Tab): Reported Weight in pounds:
Patient Weight (Optional But Required For Cumulative Dose-Numbers And Decimals Only): 150
Cheilitis Aggressive Treatment: I recommended application of Vaseline or Aquaphor numerous times a day (as often as every hour) and before going to bed. I also prescribed a topical steroid for twice daily use.
Xerosis Normal Treatment: I recommended application of Cetaphil or CeraVe numerous times a day going to bed to all dry areas.
Hypertriglyceridemia Treatment: I explained this is common when taking isotretinoin. If this worsens they will contact us. They may try OTC ibuprofen.
Kilograms Preamble Statement (Weight Entered In Details Tab): Reported Weight in kilograms:
Female Completion Statement: After discussing her treatment course we decided to discontinue isotretinoin therapy at this time. I explained that she would need to continue her birth control methods for at least one month after the last dosage. She should also get a pregnancy test one month after the last dose. She shouldn't donate blood for one month after the last dose. She should call with any new symptoms of depression.
Retinoid Dermatitis Normal Treatment: I recommended more frequent application of Cetaphil or CeraVe to the areas of dermatitis.
Weight Units: pounds
Lower Range (In Mg/Kg): 120
Nosebleeds Normal Treatment: I explained this is common when taking isotretinoin. I recommended saline mist in each nostril multiple times a day. If this worsens they will contact us.
Male Completion Statement: After discussing his treatment course we decided to discontinue isotretinoin therapy at this time. He shouldn't donate blood for one month after the last dose. He should call with any new symptoms of depression.
Xerosis Aggressive Treatment: I recommended application of Cetaphil or CeraVe numerous times a day going to bed to all dry areas. I also prescribed a topical steroid for twice daily use.
Retinoid Dermatitis Aggressive Treatment: I recommended more frequent application of Cetaphil or CeraVe to the areas of dermatitis. I also prescribed a topical steroid for twice daily use until the dermatitis resolves.
Months Of Therapy Completed: 6
Target Cumulative Dosage (In Mg/Kg): 135
Add Associated Diagnosis When Managing Medication Side Effects: Yes
Dosing Month 1 (Required For Cumulative Dosing): 30mg Daily
Xerosis Aggressive Treatment: I recommended application of Cetaphil or CeraVe numerous times a day and before going to bed to all dry areas. I also prescribed a topical steroid for twice daily use.
Dosing Month 2 (Required For Cumulative Dosing): 30mg BID
Next Month's Dosage: Continue Current Dosage
Detail Level: Zone
Xerosis Normal Treatment: I recommended application of Cetaphil or CeraVe numerous times a day and before going to bed to all dry areas.
What Is The Patient's Gender: Female
Counseling Text: I reviewed the side effect in detail. Patient should get monthly blood tests, not donate blood, not drive at night if vision affected, and not share medication.

## 2023-07-24 NOTE — PROCEDURE: HIGH RISK MEDICATION MONITORING
12/17/19 0800   Ankle Exercises   Double Leg Calf Raises Reps/Sets/Weight 30   Standing Dorsiflexion Stretch(Gastroc) Reps/Sets/Hold Time 3X30   Toe Towel Curls Reps/Sets 3' MARBLES 3'   Knee Exercises   Frontal Squats Reps/Sets/Weight 30   Tg/Shuttle/Reformer Squats Reps/Sets/Weight/Level 6' 50#   Additional Exercises   Additional Exercise NUSTEP 12' L4   Additional Exercise MTT      Dupixent Counseling: I discussed with the patient the risks of dupilumab including but not limited to eye inflammation and irritation, cold sores, injection site reactions, allergic reactions and increased risk of parasitic infection. The patient understands that monitoring is required and they must alert us or the primary physician if symptoms of infection or other concerning signs are noted.

## 2023-07-24 NOTE — PROCEDURE: PRESCRIPTION MEDICATION MANAGEMENT
Detail Level: Zone
Render In Strict Bullet Format?: No
Plan: Still with active inflammatory breakouts, will continue with one more month of dosing \\nConsider continuing course to 200mg/mg (max dosing) \\nAside from xerosis, no adverse effects noted by patient

## 2023-07-24 NOTE — PROCEDURE: ORDER TESTS
Bill For Surgical Tray: no
Billing Type: Third-Party Bill
[facility]: -[id number]
Expected Date Of Service: 07/24/2023

## 2023-07-24 NOTE — PROCEDURE: VENIPUNCTURE
Venipuncture Paragraph: An alcohol pad was applied to the venipuncture site. Venipuncture was performed using a butterfly needle. Pressure and a bandaid was applied to the site. No complications were noted.
Bill [id number] For Specimen Handling/Conveyance To Laboratory?: no
Number Of Tubes Drawn: 1
Detail Level: None

## 2023-08-16 ENCOUNTER — LAB REQUISITION (OUTPATIENT)
Dept: LAB | Facility: CLINIC | Age: 23
End: 2023-08-16

## 2023-08-16 DIAGNOSIS — R30.0 DYSURIA: ICD-10-CM

## 2023-08-16 PROCEDURE — 87086 URINE CULTURE/COLONY COUNT: CPT | Performed by: NURSE PRACTITIONER

## 2023-08-19 LAB — BACTERIA UR CULT: NO GROWTH

## 2023-08-28 ENCOUNTER — APPOINTMENT (OUTPATIENT)
Dept: URBAN - METROPOLITAN AREA CLINIC 256 | Age: 23
Setting detail: DERMATOLOGY
End: 2023-08-28

## 2023-08-28 VITALS — WEIGHT: 150 LBS | HEIGHT: 65 IN

## 2023-08-28 DIAGNOSIS — Z79.899 OTHER LONG TERM (CURRENT) DRUG THERAPY: ICD-10-CM

## 2023-08-28 DIAGNOSIS — L70.0 ACNE VULGARIS: ICD-10-CM

## 2023-08-28 PROCEDURE — 81025 URINE PREGNANCY TEST: CPT

## 2023-08-28 PROCEDURE — OTHER ISOTRETINOIN MONITORING: OTHER

## 2023-08-28 PROCEDURE — 99214 OFFICE O/P EST MOD 30 MIN: CPT

## 2023-08-28 PROCEDURE — OTHER URINE PREGNANCY TEST: OTHER

## 2023-08-28 PROCEDURE — OTHER ADDITIONAL NOTES: OTHER

## 2023-08-28 PROCEDURE — OTHER MIPS QUALITY: OTHER

## 2023-08-28 PROCEDURE — OTHER COUNSELING: OTHER

## 2023-08-28 PROCEDURE — OTHER PRESCRIPTION MEDICATION MANAGEMENT: OTHER

## 2023-08-28 PROCEDURE — OTHER HIGH RISK MEDICATION MONITORING: OTHER

## 2023-08-28 NOTE — PROCEDURE: HIGH RISK MEDICATION MONITORING
Acquired hypothyroidism Rifampin Pregnancy And Lactation Text: This medication is Pregnancy Category C and it isn't know if it is safe during pregnancy. It is also excreted in breast milk and should not be used if you are breast feeding.

## 2023-08-28 NOTE — PROCEDURE: HIGH RISK MEDICATION MONITORING
PRE-OP EVALUATION    Patient Name: Ezequiel Larson      Pre-op Diagnosis: Abdominal Pain, vomiting        Anesthesia Procedure: MRI ABDOMEN (W+WO) (PCE=[id number])      Pre-op vitals reviewed.   Temp: 99.6 °F (37.6 °C)  Pulse: 145  Resp: 24  BP: 106/90  SpO2: 100 % Cardiovascular    Cardiovascular exam normal.         Dental    No notable dental history. Pulmonary    Pulmonary exam normal.                 Other findings            ASA: 1   Plan: general  NPO status verified and patient meets guidelines. Olanzapine Counseling- I discussed with the patient the common side effects of olanzapine including but are not limited to: lack of energy, dry mouth, increased appetite, sleepiness, tremor, constipation, dizziness, changes in behavior, or restlessness.  Explained that teenagers are more likely to experience headaches, abdominal pain, pain in the arms or legs, tiredness, and sleepiness.  Serious side effects include but are not limited: increased risk of death in elderly patients who are confused, have memory loss, or dementia-related psychosis; hyperglycemia; increased cholesterol and triglycerides; and weight gain.

## 2023-08-28 NOTE — PROCEDURE: COUNSELING
Erythromycin Pregnancy And Lactation Text: This medication is Pregnancy Category B and is considered safe during pregnancy. It is also excreted in breast milk.
Benzoyl Peroxide Counseling: Patient counseled that medicine may cause skin irritation and bleach clothing.  In the event of skin irritation, the patient was advised to reduce the amount of the drug applied or use it less frequently.   The patient verbalized understanding of the proper use and possible adverse effects of benzoyl peroxide.  All of the patient's questions and concerns were addressed.
High Dose Vitamin A Counseling: Side effects reviewed, pt to contact office should one occur.
Benzoyl Peroxide Pregnancy And Lactation Text: This medication is Pregnancy Category C. It is unknown if benzoyl peroxide is excreted in breast milk.
Tazorac Counseling:  Patient advised that medication is irritating and drying.  Patient may need to apply sparingly and wash off after an hour before eventually leaving it on overnight.  The patient verbalized understanding of the proper use and possible adverse effects of tazorac.  All of the patient's questions and concerns were addressed.
Topical Sulfur Applications Counseling: Topical Sulfur Counseling: Patient counseled that this medication may cause skin irritation or allergic reactions.  In the event of skin irritation, the patient was advised to reduce the amount of the drug applied or use it less frequently.   The patient verbalized understanding of the proper use and possible adverse effects of topical sulfur application.  All of the patient's questions and concerns were addressed.
Topical Clindamycin Counseling: Patient counseled that this medication may cause skin irritation or allergic reactions.  In the event of skin irritation, the patient was advised to reduce the amount of the drug applied or use it less frequently.   The patient verbalized understanding of the proper use and possible adverse effects of clindamycin.  All of the patient's questions and concerns were addressed.
Tetracycline Pregnancy And Lactation Text: This medication is Pregnancy Category D and not consider safe during pregnancy. It is also excreted in breast milk.
Bactrim Pregnancy And Lactation Text: This medication is Pregnancy Category D and is known to cause fetal risk.  It is also excreted in breast milk.
Spironolactone Pregnancy And Lactation Text: This medication can cause feminization of the male fetus and should be avoided during pregnancy. The active metabolite is also found in breast milk.
Dapsone Counseling: I discussed with the patient the risks of dapsone including but not limited to hemolytic anemia, agranulocytosis, rashes, methemoglobinemia, kidney failure, peripheral neuropathy, headaches, GI upset, and liver toxicity.  Patients who start dapsone require monitoring including baseline LFTs and weekly CBCs for the first month, then every month thereafter.  The patient verbalized understanding of the proper use and possible adverse effects of dapsone.  All of the patient's questions and concerns were addressed.
Detail Level: Detailed
Aklief Pregnancy And Lactation Text: It is unknown if this medication is safe to use during pregnancy.  It is unknown if this medication is excreted in breast milk.  Breastfeeding women should use the topical cream on the smallest area of the skin for the shortest time needed while breastfeeding.  Do not apply to nipple and areola.
Isotretinoin Counseling: Patient should get monthly blood tests, not donate blood, not drive at night if vision affected, not share medication, and not undergo elective surgery for 6 months after tx completed. Side effects reviewed, pt to contact office should one occur.
Include Pregnancy/Lactation Warning?: No
Bactrim Counseling:  I discussed with the patient the risks of sulfa antibiotics including but not limited to GI upset, allergic reaction, drug rash, diarrhea, dizziness, photosensitivity, and yeast infections.  Rarely, more serious reactions can occur including but not limited to aplastic anemia, agranulocytosis, methemoglobinemia, blood dyscrasias, liver or kidney failure, lung infiltrates or desquamative/blistering drug rashes.
Spironolactone Counseling: Patient advised regarding risks of diarrhea, abdominal pain, hyperkalemia, birth defects (for female patients), liver toxicity and renal toxicity. The patient may need blood work to monitor liver and kidney function and potassium levels while on therapy. The patient verbalized understanding of the proper use and possible adverse effects of spironolactone.  All of the patient's questions and concerns were addressed.
Birth Control Pills Pregnancy And Lactation Text: This medication should be avoided if pregnant and for the first 30 days post-partum.
Aklief counseling:  Patient advised to apply a pea-sized amount only at bedtime and wait 30 minutes after washing their face before applying.  If too drying, patient may add a non-comedogenic moisturizer.  The most commonly reported side effects including irritation, redness, scaling, dryness, stinging, burning, itching, and increased risk of sunburn.  The patient verbalized understanding of the proper use and possible adverse effects of retinoids.  All of the patient's questions and concerns were addressed.
Tetracycline Counseling: Patient counseled regarding possible photosensitivity and increased risk for sunburn.  Patient instructed to avoid sunlight, if possible.  When exposed to sunlight, patients should wear protective clothing, sunglasses, and sunscreen.  The patient was instructed to call the office immediately if the following severe adverse effects occur:  hearing changes, easy bruising/bleeding, severe headache, or vision changes.  The patient verbalized understanding of the proper use and possible adverse effects of tetracycline.  All of the patient's questions and concerns were addressed. Patient understands to avoid pregnancy while on therapy due to potential birth defects.
Topical Sulfur Applications Pregnancy And Lactation Text: This medication is Pregnancy Category C and has an unknown safety profile during pregnancy. It is unknown if this topical medication is excreted in breast milk.
Sarecycline Counseling: Patient advised regarding possible photosensitivity and discoloration of the teeth, skin, lips, tongue and gums.  Patient instructed to avoid sunlight, if possible.  When exposed to sunlight, patients should wear protective clothing, sunglasses, and sunscreen.  The patient was instructed to call the office immediately if the following severe adverse effects occur:  hearing changes, easy bruising/bleeding, severe headache, or vision changes.  The patient verbalized understanding of the proper use and possible adverse effects of sarecycline.  All of the patient's questions and concerns were addressed.
Azithromycin Pregnancy And Lactation Text: This medication is considered safe during pregnancy and is also secreted in breast milk.
Doxycycline Counseling:  Patient counseled regarding possible photosensitivity and increased risk for sunburn.  Patient instructed to avoid sunlight, if possible.  When exposed to sunlight, patients should wear protective clothing, sunglasses, and sunscreen.  The patient was instructed to call the office immediately if the following severe adverse effects occur:  hearing changes, easy bruising/bleeding, severe headache, or vision changes.  The patient verbalized understanding of the proper use and possible adverse effects of doxycycline.  All of the patient's questions and concerns were addressed.
Azelaic Acid Pregnancy And Lactation Text: This medication is considered safe during pregnancy and breast feeding.
Topical Retinoid counseling:  Patient advised to apply a pea-sized amount only at bedtime and wait 30 minutes after washing their face before applying.  If too drying, patient may add a non-comedogenic moisturizer. The patient verbalized understanding of the proper use and possible adverse effects of retinoids.  All of the patient's questions and concerns were addressed.
Doxycycline Pregnancy And Lactation Text: This medication is Pregnancy Category D and not consider safe during pregnancy. It is also excreted in breast milk but is considered safe for shorter treatment courses.
Dapsone Pregnancy And Lactation Text: This medication is Pregnancy Category C and is not considered safe during pregnancy or breast feeding.
Azelaic Acid Counseling: Patient counseled that medicine may cause skin irritation and to avoid applying near the eyes.  In the event of skin irritation, the patient was advised to reduce the amount of the drug applied or use it less frequently.   The patient verbalized understanding of the proper use and possible adverse effects of azelaic acid.  All of the patient's questions and concerns were addressed.
Winlevi Counseling:  I discussed with the patient the risks of topical clascoterone including but not limited to erythema, scaling, itching, and stinging. Patient voiced their understanding.
High Dose Vitamin A Pregnancy And Lactation Text: High dose vitamin A therapy is contraindicated during pregnancy and breast feeding.
Topical Clindamycin Pregnancy And Lactation Text: This medication is Pregnancy Category B and is considered safe during pregnancy. It is unknown if it is excreted in breast milk.
Erythromycin Counseling:  I discussed with the patient the risks of erythromycin including but not limited to GI upset, allergic reaction, drug rash, diarrhea, increase in liver enzymes, and yeast infections.
Winlevi Pregnancy And Lactation Text: This medication is considered safe during pregnancy and breastfeeding.
Birth Control Pills Counseling: Birth Control Pill Counseling: I discussed with the patient the potential side effects of OCPs including but not limited to increased risk of stroke, heart attack, thrombophlebitis, deep venous thrombosis, hepatic adenomas, breast changes, GI upset, headaches, and depression.  The patient verbalized understanding of the proper use and possible adverse effects of OCPs. All of the patient's questions and concerns were addressed.
Azithromycin Counseling:  I discussed with the patient the risks of azithromycin including but not limited to GI upset, allergic reaction, drug rash, diarrhea, and yeast infections.
Tazorac Pregnancy And Lactation Text: This medication is not safe during pregnancy. It is unknown if this medication is excreted in breast milk.
Minocycline Counseling: Patient advised regarding possible photosensitivity and discoloration of the teeth, skin, lips, tongue and gums.  Patient instructed to avoid sunlight, if possible.  When exposed to sunlight, patients should wear protective clothing, sunglasses, and sunscreen.  The patient was instructed to call the office immediately if the following severe adverse effects occur:  hearing changes, easy bruising/bleeding, severe headache, or vision changes.  The patient verbalized understanding of the proper use and possible adverse effects of minocycline.  All of the patient's questions and concerns were addressed.
Topical Retinoid Pregnancy And Lactation Text: This medication is Pregnancy Category C. It is unknown if this medication is excreted in breast milk.
Isotretinoin Pregnancy And Lactation Text: This medication is Pregnancy Category X and is considered extremely dangerous during pregnancy. It is unknown if it is excreted in breast milk.

## 2023-08-28 NOTE — PROCEDURE: ADDITIONAL NOTES
Additional Notes: Discussed the need for a urine pregnancy test 30 days after completion.
Render Risk Assessment In Note?: no
Detail Level: Simple

## 2023-08-28 NOTE — PROCEDURE: ISOTRETINOIN MONITORING
Cheilitis Aggressive Treatment: I recommended application of Vaseline or Aquaphor numerous times a day (as often as every hour) and before going to bed. I also prescribed a topical steroid for twice daily use.
Use Therapeutic Ranged Or Therapeutic Target: please select Range or Target
Nosebleeds Normal Treatment: I explained this is common when taking isotretinoin. I recommended saline mist in each nostril multiple times a day. If this worsens they will contact us.
Detail Level: Zone
Use Enhanced Counseling Feature (Automatic): No
Counseling Text: I reviewed the side effect in detail. Patient should get monthly blood tests, not donate blood, not drive at night if vision affected, and not share medication.
Dosing Month 5 (Required For Cumulative Dosing): 70mg Daily
Cheilitis Normal Treatment: I recommended application of Vaseline or Aquaphor numerous times a day (as often as every hour) and before going to bed.
Dosing Month 3 (Required For Cumulative Dosing): 30mg BID
Upper Range (In Mg/Kg): 150
Next Month's Dosage: Discontinue
Hypercholesterolemia Monitoring: I explained this is common when taking isotretinoin. We will monitor closely.
Dosing Month 1 (Required For Cumulative Dosing): 30mg Daily
Lower Range (In Mg/Kg): 120
Retinoid Dermatitis Aggressive Treatment: I recommended more frequent application of Cetaphil or CeraVe to the areas of dermatitis. I also prescribed a topical steroid for twice daily use until the dermatitis resolves.
Myalgia Monitoring: I explained this is common when taking isotretinoin. If this worsens they will contact us.
Xerosis Normal Treatment: I recommended application of Cetaphil or CeraVe numerous times a day and before going to bed to all dry areas.
Xerosis Normal Treatment: I recommended application of Cetaphil or CeraVe numerous times a day going to bed to all dry areas.
Hypertriglyceridemia Treatment: I explained this is common when taking isotretinoin. If this worsens they will contact us. They may try OTC ibuprofen.
Target Cumulative Dosage (In Mg/Kg): 135
Headache Monitoring: I recommended monitoring the headaches for now. There is no evidence of increased intracranial pressure. They were instructed to call if the headaches are worsening.
Retinoid Dermatitis Normal Treatment: I recommended more frequent application of Cetaphil or CeraVe to the areas of dermatitis.
Xerosis Aggressive Treatment: I recommended application of Cetaphil or CeraVe numerous times a day going to bed to all dry areas. I also prescribed a topical steroid for twice daily use.
Months Of Therapy Completed: 7
Weight Units: pounds
Show Text Field For Brand Names Of Contraception?: Yes
What Is The Patient's Gender: Female
Kilograms Preamble Statement (Weight Entered In Details Tab): Reported Weight in kilograms:
Male Completion Statement: After discussing his treatment course we decided to discontinue isotretinoin therapy at this time. He shouldn't donate blood for one month after the last dose. He should call with any new symptoms of depression.
Female Pregnancy Counseling Text: Female patients should also be on two forms of birth control while taking this medication and for one month after their last dose.
Pounds Preamble Statement (Weight Entered In Details Tab): Reported Weight in pounds:
Female Completion Statement: After discussing her treatment course we decided to discontinue isotretinoin therapy at this time. I explained that she would need to continue her birth control methods for at least one month after the last dosage. She should also get a pregnancy test one month after the last dose. She shouldn't donate blood for one month after the last dose. She should call with any new symptoms of depression.
Xerosis Aggressive Treatment: I recommended application of Cetaphil or CeraVe numerous times a day and before going to bed to all dry areas. I also prescribed a topical steroid for twice daily use.

## 2023-09-28 ENCOUNTER — APPOINTMENT (OUTPATIENT)
Dept: URBAN - METROPOLITAN AREA CLINIC 256 | Age: 23
Setting detail: DERMATOLOGY
End: 2023-09-28

## 2023-09-28 VITALS — WEIGHT: 150 LBS | HEIGHT: 67 IN

## 2023-09-28 DIAGNOSIS — L70.0 ACNE VULGARIS: ICD-10-CM

## 2023-09-28 DIAGNOSIS — L74.51 PRIMARY FOCAL HYPERHIDROSIS: ICD-10-CM

## 2023-09-28 DIAGNOSIS — L72.0 EPIDERMAL CYST: ICD-10-CM

## 2023-09-28 PROBLEM — L74.512 PRIMARY FOCAL HYPERHIDROSIS, PALMS: Status: ACTIVE | Noted: 2023-09-28

## 2023-09-28 PROCEDURE — OTHER COUNSELING: OTHER

## 2023-09-28 PROCEDURE — OTHER PATIENT SPECIFIC COUNSELING: OTHER

## 2023-09-28 PROCEDURE — OTHER PRESCRIPTION MEDICATION MANAGEMENT: OTHER

## 2023-09-28 PROCEDURE — OTHER URINE PREGNANCY TEST: OTHER

## 2023-09-28 PROCEDURE — 81025 URINE PREGNANCY TEST: CPT

## 2023-09-28 PROCEDURE — OTHER MIPS QUALITY: OTHER

## 2023-09-28 PROCEDURE — 99214 OFFICE O/P EST MOD 30 MIN: CPT

## 2023-09-28 PROCEDURE — OTHER PRESCRIPTION: OTHER

## 2023-09-28 RX ORDER — TRETIONIN 0.25 MG/G
CREAM TOPICAL
Qty: 45 | Refills: 4 | Status: ERX | COMMUNITY
Start: 2023-09-28

## 2023-09-28 RX ORDER — ALUMINUM CHLORIDE 20 %
SOLUTION, NON-ORAL TOPICAL QHS
Qty: 60 | Refills: 2 | Status: ERX | COMMUNITY
Start: 2023-09-28

## 2023-09-28 ASSESSMENT — LOCATION DETAILED DESCRIPTION DERM
LOCATION DETAILED: RIGHT ULNAR PALM
LOCATION DETAILED: LEFT RADIAL PALM
LOCATION DETAILED: LEFT INFERIOR CENTRAL MALAR CHEEK
LOCATION DETAILED: LEFT CENTRAL MALAR CHEEK

## 2023-09-28 ASSESSMENT — LOCATION ZONE DERM
LOCATION ZONE: HAND
LOCATION ZONE: FACE

## 2023-09-28 ASSESSMENT — LOCATION SIMPLE DESCRIPTION DERM
LOCATION SIMPLE: RIGHT HAND
LOCATION SIMPLE: LEFT CHEEK
LOCATION SIMPLE: LEFT HAND

## 2023-09-28 NOTE — PROCEDURE: PRESCRIPTION MEDICATION MANAGEMENT
Detail Level: Zone
Plan: Recheck in 6 weeks for re-evaluation.
Render In Strict Bullet Format?: No
Initiate Treatment: Apply Drysol Dab-O-Matic 20 % topical solution QHS to areas of excessive sweating (ie: hands) at bedtime.
Initiate Treatment: Apply tretinoin 0.025 % topical cream to face every other night x 2-4 wks, then increase until nightly as tolerated, avoiding the eyelids
Plan: RTC in 6 weeks for recheck.

## 2023-09-28 NOTE — PROCEDURE: MIPS QUALITY
Quality 226: Preventive Care And Screening: Tobacco Use: Screening And Cessation Intervention: Patient screened for tobacco use and is an ex/non-smoker
Quality 130: Documentation Of Current Medications In The Medical Record: Current Medications Documented
Quality 431: Preventive Care And Screening: Unhealthy Alcohol Use - Screening: Patient not screened for unhealthy alcohol use using a systematic screening method
Quality 110: Preventive Care And Screening: Influenza Immunization: Influenza Immunization Administered during Influenza season
Detail Level: Detailed

## 2023-09-28 NOTE — PROCEDURE: PATIENT SPECIFIC COUNSELING
Detail Level: Zone
-Informed the patient skin lesions were milia.\\n-Informed the patient if she wants them removed we could in clinic with a comodonal extraction. For a less invasive treatment option, recommended she consider tretion along with discontinuing her current lotion.\\n-The patient was agreeable with this plan and also recommended trialing OTC lotions such as eucerin, cetaphil, & La Roche Posey.
-Informed the patient her sweating is consistent with hyperhidrosis.\\n-To treat, discuss the risk & benefit of treatment options of topical vs oral vs botox injections.\\n-The patient elected to try the topical option first and will consider other treatment options in the future.
Last UPT for post-isotretinoin done today. UPT was added to [employer] account.

## 2023-10-23 NOTE — PROCEDURE: HIGH RISK MEDICATION MONITORING
Mirvaso Counseling: Mirvaso is a topical medication which can decrease superficial blood flow where applied. Side effects are uncommon and include stinging, redness and allergic reactions. Cyclosporine Counseling:  I discussed with the patient the risks of cyclosporine including but not limited to hypertension, gingival hyperplasia,myelosuppression, immunosuppression, liver damage, kidney damage, neurotoxicity, lymphoma, and serious infections. The patient understands that monitoring is required including baseline blood pressure, CBC, CMP, lipid panel and uric acid, and then 1-2 times monthly CMP and blood pressure.

## 2023-11-25 NOTE — PROCEDURE: HIGH RISK MEDICATION MONITORING
Gabapentin Counseling: I discussed with the patient the risks of gabapentin including but not limited to dizziness, somnolence, fatigue and ataxia. no

## 2024-02-02 NOTE — PROCEDURE: HIGH RISK MEDICATION MONITORING
Elidel Pregnancy And Lactation Text: This medication is Pregnancy Category C. It is unknown if this medication is excreted in breast milk. Warm/Dry

## 2024-02-24 ENCOUNTER — HEALTH MAINTENANCE LETTER (OUTPATIENT)
Age: 24
End: 2024-02-24

## 2024-03-18 ENCOUNTER — LAB REQUISITION (OUTPATIENT)
Dept: LAB | Facility: CLINIC | Age: 24
End: 2024-03-18

## 2024-03-18 DIAGNOSIS — Z11.3 ENCOUNTER FOR SCREENING FOR INFECTIONS WITH A PREDOMINANTLY SEXUAL MODE OF TRANSMISSION: ICD-10-CM

## 2024-03-18 DIAGNOSIS — Z12.4 ENCOUNTER FOR SCREENING FOR MALIGNANT NEOPLASM OF CERVIX: ICD-10-CM

## 2024-03-18 PROCEDURE — 87624 HPV HI-RISK TYP POOLED RSLT: CPT | Performed by: OBSTETRICS & GYNECOLOGY

## 2024-03-18 PROCEDURE — 87491 CHLMYD TRACH DNA AMP PROBE: CPT | Performed by: OBSTETRICS & GYNECOLOGY

## 2024-03-18 PROCEDURE — G0145 SCR C/V CYTO,THINLAYER,RESCR: HCPCS | Performed by: OBSTETRICS & GYNECOLOGY

## 2024-03-19 LAB
C TRACH DNA SPEC QL PROBE+SIG AMP: NEGATIVE
N GONORRHOEA DNA SPEC QL NAA+PROBE: NEGATIVE

## 2024-03-20 LAB
BKR LAB AP GYN ADEQUACY: NORMAL
BKR LAB AP GYN INTERPRETATION: NORMAL
BKR LAB AP HPV REFLEX: NORMAL
BKR LAB AP LMP: NORMAL
BKR LAB AP PREVIOUS ABNL DX: NORMAL
BKR LAB AP PREVIOUS ABNORMAL: NORMAL
PATH REPORT.COMMENTS IMP SPEC: NORMAL
PATH REPORT.COMMENTS IMP SPEC: NORMAL
PATH REPORT.RELEVANT HX SPEC: NORMAL

## 2024-03-21 LAB
HUMAN PAPILLOMA VIRUS 16 DNA: NEGATIVE
HUMAN PAPILLOMA VIRUS 18 DNA: NEGATIVE
HUMAN PAPILLOMA VIRUS FINAL DIAGNOSIS: ABNORMAL
HUMAN PAPILLOMA VIRUS OTHER HR: POSITIVE

## 2025-03-09 ENCOUNTER — HEALTH MAINTENANCE LETTER (OUTPATIENT)
Age: 25
End: 2025-03-09

## 2025-03-12 ENCOUNTER — LAB REQUISITION (OUTPATIENT)
Dept: LAB | Facility: CLINIC | Age: 25
End: 2025-03-12

## 2025-03-12 DIAGNOSIS — Z87.42 PERSONAL HISTORY OF OTHER DISEASES OF THE FEMALE GENITAL TRACT: ICD-10-CM

## 2025-03-12 PROCEDURE — 87624 HPV HI-RISK TYP POOLED RSLT: CPT | Performed by: OBSTETRICS & GYNECOLOGY

## 2025-03-12 PROCEDURE — G0145 SCR C/V CYTO,THINLAYER,RESCR: HCPCS | Performed by: OBSTETRICS & GYNECOLOGY

## 2025-03-17 LAB
HPV HR 12 DNA CVX QL NAA+PROBE: NEGATIVE
HPV16 DNA CVX QL NAA+PROBE: NEGATIVE
HPV18 DNA CVX QL NAA+PROBE: NEGATIVE
HUMAN PAPILLOMA VIRUS FINAL DIAGNOSIS: NORMAL

## 2025-03-18 LAB
BKR AP ASSOCIATED HPV REPORT: NORMAL
BKR LAB AP GYN ADEQUACY: NORMAL
BKR LAB AP GYN INTERPRETATION: NORMAL
BKR LAB AP LMP: NORMAL
BKR LAB AP PREVIOUS ABNL DX: NORMAL
BKR LAB AP PREVIOUS ABNORMAL: NORMAL
PATH REPORT.COMMENTS IMP SPEC: NORMAL
PATH REPORT.COMMENTS IMP SPEC: NORMAL
PATH REPORT.RELEVANT HX SPEC: NORMAL

## 2025-07-24 NOTE — PROCEDURE: ISOTRETINOIN MONITORING
General Sunscreen Counseling: I recommended a broad spectrum sunscreen with a SPF of 30 or higher.  I explained that SPF 30 sunscreens block approximately 97 percent of the sun's harmful rays.  Sunscreens should be applied at least 15 minutes prior to expected sun exposure and then every 2 hours after that as long as sun exposure continues. If swimming or exercising sunscreen should be reapplied every 45 minutes to an hour after getting wet or sweating.  One ounce, or the equivalent of a shot glass full of sunscreen, is adequate to protect the skin not covered by a bathing suit. I also recommended a lip balm with a sunscreen as well. Sun protective clothing can be used in lieu of sunscreen but must be worn the entire time you are exposed to the sun's rays. Detail Level: Generalized Myalgia Treatment: I explained this is common when taking isotretinoin. If this worsens they will contact us. They may try OTC ibuprofen.